# Patient Record
Sex: FEMALE | Race: BLACK OR AFRICAN AMERICAN | NOT HISPANIC OR LATINO | Employment: FULL TIME | URBAN - METROPOLITAN AREA
[De-identification: names, ages, dates, MRNs, and addresses within clinical notes are randomized per-mention and may not be internally consistent; named-entity substitution may affect disease eponyms.]

---

## 2017-03-09 ENCOUNTER — OFFICE VISIT (OUTPATIENT)
Dept: FAMILY MEDICINE | Facility: CLINIC | Age: 52
End: 2017-03-09
Payer: COMMERCIAL

## 2017-03-09 VITALS
HEART RATE: 96 BPM | OXYGEN SATURATION: 97 % | BODY MASS INDEX: 34.22 KG/M2 | TEMPERATURE: 98 F | DIASTOLIC BLOOD PRESSURE: 78 MMHG | WEIGHT: 169.75 LBS | SYSTOLIC BLOOD PRESSURE: 120 MMHG | HEIGHT: 59 IN

## 2017-03-09 DIAGNOSIS — B96.89 ACUTE BACTERIAL BRONCHITIS: Primary | ICD-10-CM

## 2017-03-09 DIAGNOSIS — J20.8 ACUTE BACTERIAL BRONCHITIS: Primary | ICD-10-CM

## 2017-03-09 DIAGNOSIS — R50.9 FEVER AND CHILLS: ICD-10-CM

## 2017-03-09 LAB
FLUAV AG SPEC QL IA: NEGATIVE
FLUBV AG SPEC QL IA: NEGATIVE
SPECIMEN SOURCE: NORMAL

## 2017-03-09 PROCEDURE — 96372 THER/PROPH/DIAG INJ SC/IM: CPT | Mod: S$GLB,,, | Performed by: NURSE PRACTITIONER

## 2017-03-09 PROCEDURE — 1160F RVW MEDS BY RX/DR IN RCRD: CPT | Mod: S$GLB,,, | Performed by: NURSE PRACTITIONER

## 2017-03-09 PROCEDURE — 3078F DIAST BP <80 MM HG: CPT | Mod: S$GLB,,, | Performed by: NURSE PRACTITIONER

## 2017-03-09 PROCEDURE — 3074F SYST BP LT 130 MM HG: CPT | Mod: S$GLB,,, | Performed by: NURSE PRACTITIONER

## 2017-03-09 PROCEDURE — 99214 OFFICE O/P EST MOD 30 MIN: CPT | Mod: 25,S$GLB,, | Performed by: NURSE PRACTITIONER

## 2017-03-09 PROCEDURE — 99999 PR PBB SHADOW E&M-EST. PATIENT-LVL IV: CPT | Mod: PBBFAC,,, | Performed by: NURSE PRACTITIONER

## 2017-03-09 PROCEDURE — 87400 INFLUENZA A/B EACH AG IA: CPT | Mod: 59,PO

## 2017-03-09 RX ORDER — AZITHROMYCIN 250 MG/1
TABLET, FILM COATED ORAL
Qty: 6 TABLET | Refills: 0 | Status: SHIPPED | OUTPATIENT
Start: 2017-03-09 | End: 2017-05-23

## 2017-03-09 RX ORDER — DEXAMETHASONE SODIUM PHOSPHATE 4 MG/ML
8 INJECTION, SOLUTION INTRA-ARTICULAR; INTRALESIONAL; INTRAMUSCULAR; INTRAVENOUS; SOFT TISSUE
Status: COMPLETED | OUTPATIENT
Start: 2017-03-09 | End: 2017-03-09

## 2017-03-09 RX ORDER — BENZONATATE 200 MG/1
200 CAPSULE ORAL 3 TIMES DAILY PRN
Qty: 30 CAPSULE | Refills: 0 | Status: SHIPPED | OUTPATIENT
Start: 2017-03-09 | End: 2017-03-19

## 2017-03-09 RX ORDER — IBUPROFEN 800 MG/1
800 TABLET ORAL 3 TIMES DAILY
Qty: 30 TABLET | Refills: 0 | Status: SHIPPED | OUTPATIENT
Start: 2017-03-09 | End: 2018-02-20

## 2017-03-09 RX ADMIN — DEXAMETHASONE SODIUM PHOSPHATE 8 MG: 4 INJECTION, SOLUTION INTRA-ARTICULAR; INTRALESIONAL; INTRAMUSCULAR; INTRAVENOUS; SOFT TISSUE at 09:03

## 2017-03-09 NOTE — PATIENT INSTRUCTIONS
Viral Upper Respiratory Illness (Adult)  You have a viral upper respiratory illness (URI), which is another term for the common cold. This illness is contagious during the first few days. It is spread through the air by coughing and sneezing. It may also be spread by direct contact (touching the sick person and then touching your own eyes, nose, or mouth). Frequent handwashing will decrease risk of spread. Most viral illnesses go away within 7 to 10 days with rest and simple home remedies. Sometimes the illness may last for several weeks. Antibiotics will not kill a virus, and they are generally not prescribed for this condition.    Home care  · If symptoms are severe, rest at home for the first 2 to 3 days. When you resume activity, don't let yourself get too tired.  · Avoid being exposed to cigarette smoke (yours or others).  · You may use acetaminophen or ibuprofen to control pain and fever, unless another medicine was prescribed. (Note: If you have chronic liver or kidney disease, have ever had a stomach ulcer or gastrointestinal bleeding, or are taking blood-thinning medicines, talk with your healthcare provider before using these medicines.) Aspirin should never be given to anyone under 18 years of age who is ill with a viral infection or fever. It may cause severe liver or brain damage.  · Your appetite may be poor, so a light diet is fine. Avoid dehydration by drinking 6 to 8 glasses of fluids per day (water, soft drinks, juices, tea, or soup). Extra fluids will help loosen secretions in the nose and lungs.  · Over-the-counter cold medicines will not shorten the length of time youre sick, but they may be helpful for the following symptoms: cough, sore throat, and nasal and sinus congestion. (Note: Do not use decongestants if you have high blood pressure.)  Follow-up care  Follow up with your healthcare provider, or as advised.  When to seek medical advice  Call your healthcare provider right away if any  of these occur:  · Cough with lots of colored sputum (mucus)  · Severe headache; face, neck, or ear pain  · Difficulty swallowing due to throat pain  · Fever of 100.4°F (38°C)  Call 911, or get immediate medical care  Call emergency services right away if any of these occur:  · Chest pain, shortness of breath, wheezing, or difficulty breathing  · Coughing up blood  · Inability to swallow due to throat pain  Date Last Reviewed: 9/13/2015  © 3486-6791 [employer]. [address] [place] [address]. All rights reserved. This information is not intended as a substitute for professional medical care. Always follow your healthcare professional's instructions.

## 2017-03-09 NOTE — PROGRESS NOTES
"Subjective:       Patient ID: Rika June is a 51 y.o. female.    Chief Complaint: URI    URI    This is a new problem. The current episode started 1 to 4 weeks ago. The problem has been unchanged. There has been no fever. Associated symptoms include coughing, ear pain and headaches. Pertinent negatives include no congestion, rhinorrhea, sneezing or sore throat. Treatments tried: theraflu.       No past medical history on file.    Social History     Social History    Marital status: Legally      Spouse name: N/A    Number of children: N/A    Years of education: N/A     Occupational History    Not on file.     Social History Main Topics    Smoking status: Never Smoker    Smokeless tobacco: Not on file    Alcohol use No    Drug use: No    Sexual activity: Yes     Partners: Male     Other Topics Concern    Are You Pregnant Or Think You May Be? No    Breast-Feeding No     Social History Narrative       Past Surgical History:   Procedure Laterality Date     SECTION      TUBAL LIGATION         Review of Systems   Constitutional: Positive for fever. Negative for chills.   HENT: Positive for ear pain and postnasal drip. Negative for congestion, rhinorrhea, sinus pressure, sneezing, sore throat and trouble swallowing.    Respiratory: Positive for cough.    Neurological: Positive for headaches.   All other systems reviewed and are negative.      Objective:   /78 (BP Location: Right arm, Patient Position: Sitting, BP Method: Manual)  Pulse 96  Temp 98.3 °F (36.8 °C) (Oral)   Ht 4' 11" (1.499 m)  Wt 77 kg (169 lb 12.1 oz)  SpO2 97%  BMI 34.29 kg/m2     Physical Exam   Constitutional: She is oriented to person, place, and time. She appears well-developed and well-nourished. She is cooperative.   HENT:   Head: Normocephalic and atraumatic.   Right Ear: Hearing, tympanic membrane, external ear and ear canal normal.   Left Ear: Hearing, tympanic membrane, external ear and ear canal " normal.   Nose: No rhinorrhea.   Mouth/Throat: No oropharyngeal exudate, posterior oropharyngeal edema or posterior oropharyngeal erythema.   Cardiovascular: Normal rate and regular rhythm.    Pulmonary/Chest: Effort normal. No respiratory distress. She has decreased breath sounds in the right lower field and the left lower field. She has no wheezes. She has no rhonchi. She has no rales.   Neurological: She is alert and oriented to person, place, and time.   Skin: Skin is warm, dry and intact. She is not diaphoretic. No pallor.   Psychiatric: She has a normal mood and affect. Her speech is normal and behavior is normal.   Vitals reviewed.      Assessment:       1. Acute bacterial bronchitis    2. Fever and chills        Plan:       Rika was seen today for uri.    Diagnoses and all orders for this visit:    Acute bacterial bronchitis  -     benzonatate (TESSALON) 200 MG capsule; Take 1 capsule (200 mg total) by mouth 3 (three) times daily as needed for Cough.  -     dexamethasone injection 8 mg; Inject 2 mLs (8 mg total) into the muscle one time.  -     ibuprofen (ADVIL,MOTRIN) 800 MG tablet; Take 1 tablet (800 mg total) by mouth 3 (three) times daily.  -     azithromycin (Z-SOLO) 250 MG tablet; Take 2 pills today, then 1 pill every day for the next 4 days    Fever and chills  -     Influenza antigen Nasopharyngeal Swab (-) in clinic today        Return if symptoms worsen or fail to improve.

## 2017-03-09 NOTE — LETTER
March 9, 2017      Rika June   [address] [place] [id number]             [facility]  [address] [facility] [address]  Phone: [phone]  Fax: [phone] Rika June    Was treated here on 03/09/2017    May Return to work/school on 03/12/2017    No Restrictions        MEHRDAD GallegosP-C

## 2017-03-09 NOTE — MR AVS SNAPSHOT
[facility]  [address] [facility]  [place] [address]  Phone: [phone]  Fax: [phone]                  Rika June   3/9/2017 9:20 AM   Office Visit    Description:  Female : 1965   Provider:  COY Razo   Department:  [facility]           Reason for Visit     URI           Diagnoses this Visit        Comments    Fever and chills    -  Primary            To Do List           Goals (5 Years of Data)     None      Follow-Up and Disposition     Return if symptoms worsen or fail to improve.       These Medications        Disp Refills Start End    benzonatate (TESSALON) 200 MG capsule 30 capsule 0 3/9/2017 3/19/2017    Take 1 capsule (200 mg total) by mouth 3 (three) times daily as needed for Cough. - Oral    Pharmacy: [facility] [address] [place] [address] [place] [facility] Ph #: [phone]       ibuprofen (ADVIL,MOTRIN) 800 MG tablet 30 tablet 0 3/9/2017     Take 1 tablet (800 mg total) by mouth 3 (three) times daily. - Oral    Pharmacy: [facility] [address] [place] [address] [place] [facility] Ph #: [phone]         [facility]     [facility] -  Assistance  Registered nurses in the [facility] provide clinical advisement, health education, appointment booking, and other advisory services.  Call for this free service at [phone].             Medications           Message regarding Medications     Verify the changes and/or additions to your medication regime listed below are the same as discussed with your clinician today.  If any of these changes or additions are incorrect, please notify your healthcare provider.        START taking these NEW medications        Refills    benzonatate (TESSALON) 200 MG capsule 0    Sig: Take 1 capsule (200 mg total) by mouth 3 (three) times daily as needed for Cough.    Class: Normal    Route:  Oral    ibuprofen (ADVIL,MOTRIN) 800 MG tablet 0    Sig: Take 1 tablet (800 mg total) by mouth 3 (three) times daily.    Class: Normal    Route: Oral      These medications were administered today        Dose Freq    dexamethasone injection 8 mg 8 mg [facility] 1 time    Sig: Inject 2 mLs (8 mg total) into the muscle one time.    Class: Normal    Route: Intramuscular      STOP taking these medications     predniSONE (DELTASONE) 20 MG tablet Please take three tabs po daily for five days total.           Verify that the below list of medications is an accurate representation of the medications you are currently taking.  If none reported, the list may be blank. If incorrect, please contact your healthcare provider. Carry this list with you in case of emergency.           Current Medications     albuterol (PROVENTIL) 2.5 mg /3 mL (0.083 %) nebulizer solution Take 3 mLs (2.5 mg total) by nebulization every 6 (six) hours as needed.    albuterol 90 mcg/actuation inhaler Inhale 2 puffs into the lungs every 6 (six) hours as needed for Wheezing.    benzonatate (TESSALON) 200 MG capsule Take 1 capsule (200 mg total) by mouth 3 (three) times daily as needed for Cough.    betamethasone dipropionate (DIPROLENE) 0.05 % cream Apply to affected area 3 times daily.  Not for face    clotrimazole (LOTRIMIN) 1 % cream Apply topically 2 (two) times daily.    ergocalciferol (VITAMIN D2) 50,000 unit Cap Take 1 capsule (50,000 Units total) by mouth every 7 days.    fluconazole (DIFLUCAN) 150 MG Tab Take two tabs by mouth on day one, then one tab by mouth each day for four days.    ibuprofen (ADVIL,MOTRIN) 800 MG tablet Take 1 tablet (800 mg total) by mouth 3 (three) times daily.    triamcinolone acetonide 0.1% (KENALOG) 0.1 % cream AAA bid on arms and under right breast.    valacyclovir (VALTREX) 1000 MG tablet Take 1 tablet (1,000 mg total) by mouth 2 (two) times daily.           Clinical Reference Information           Your Vitals Were   "   BP Pulse Temp Height Weight SpO2    120/78 (BP Location: Right arm, Patient Position: Sitting, BP Method: Manual) 96 98.3 °F (36.8 °C) (Oral) 4' 11" (1.499 m) 77 kg (169 lb 12.1 oz) 97%    BMI                34.29 kg/m2          Blood Pressure          Most Recent Value    BP  120/78      Allergies as of 3/9/2017     Hydrocodone-acetaminophen      Immunizations Administered on Date of Encounter - 3/9/2017     None      Orders Placed During Today's Visit      Normal Orders This Visit    Influenza antigen Nasopharyngeal Swab       Instructions      Viral Upper Respiratory Illness (Adult)  You have a viral upper respiratory illness (URI), which is another term for the common cold. This illness is contagious during the first few days. It is spread through the air by coughing and sneezing. It may also be spread by direct contact (touching the sick person and then touching your own eyes, nose, or mouth). Frequent handwashing will decrease risk of spread. Most viral illnesses go away within 7 to 10 days with rest and simple home remedies. Sometimes the illness may last for several weeks. Antibiotics will not kill a virus, and they are generally not prescribed for this condition.    Home care  · If symptoms are severe, rest at home for the first 2 to 3 days. When you resume activity, don't let yourself get too tired.  · Avoid being exposed to cigarette smoke (yours or others).  · You may use acetaminophen or ibuprofen to control pain and fever, unless another medicine was prescribed. (Note: If you have chronic liver or kidney disease, have ever had a stomach ulcer or gastrointestinal bleeding, or are taking blood-thinning medicines, talk with your healthcare provider before using these medicines.) Aspirin should never be given to anyone under 18 years of age who is ill with a viral infection or fever. It may cause severe liver or brain damage.  · Your appetite may be poor, so a light diet is fine. Avoid dehydration by " drinking 6 to 8 glasses of fluids per day (water, soft drinks, juices, tea, or soup). Extra fluids will help loosen secretions in the nose and lungs.  · Over-the-counter cold medicines will not shorten the length of time youre sick, but they may be helpful for the following symptoms: cough, sore throat, and nasal and sinus congestion. (Note: Do not use decongestants if you have high blood pressure.)  Follow-up care  Follow up with your healthcare provider, or as advised.  When to seek medical advice  Call your healthcare provider right away if any of these occur:  · Cough with lots of colored sputum (mucus)  · Severe headache; face, neck, or ear pain  · Difficulty swallowing due to throat pain  · Fever of 100.4°F (38°C)  Call 911, or get immediate medical care  Call emergency services right away if any of these occur:  · Chest pain, shortness of breath, wheezing, or difficulty breathing  · Coughing up blood  · Inability to swallow due to throat pain  Date Last Reviewed: 9/13/2015  © 5141-5737 [employer]. [address] [place] [address]. All rights reserved. This information is not intended as a substitute for professional medical care. Always follow your healthcare professional's instructions.             Language Assistance Services     ATTENTION: Language assistance services are available, free of charge. Please call [phone].      ATENCIÓN: Si habla español, tiene a sylvester disposición servicios gratuitos de asistencia lingüística. Llame al [phone].     CHÚ Ý: N?u b?n nói Ti?ng Vi?t, có các d?ch v? h? tr? ngôn ng? mi?n phí dành cho b?n. G?i s? [phone].         [facility] complies with applicable Federal civil rights laws and does not discriminate on the basis of race, color, national origin, age, disability, or sex.

## 2017-03-17 ENCOUNTER — TELEPHONE (OUTPATIENT)
Dept: FAMILY MEDICINE | Facility: CLINIC | Age: 52
End: 2017-03-17

## 2017-03-17 NOTE — TELEPHONE ENCOUNTER
----- Message from Maggy Byrd sent at 3/14/2017 10:22 AM CDT -----  Contact: self  Pt. Is still not feeling well and would like a call back please call at [phone]

## 2017-05-23 ENCOUNTER — OFFICE VISIT (OUTPATIENT)
Dept: FAMILY MEDICINE | Facility: CLINIC | Age: 52
End: 2017-05-23
Payer: COMMERCIAL

## 2017-05-23 VITALS
BODY MASS INDEX: 33.93 KG/M2 | TEMPERATURE: 98 F | HEIGHT: 59 IN | OXYGEN SATURATION: 99 % | WEIGHT: 168.31 LBS | SYSTOLIC BLOOD PRESSURE: 160 MMHG | DIASTOLIC BLOOD PRESSURE: 96 MMHG | HEART RATE: 90 BPM

## 2017-05-23 DIAGNOSIS — R30.0 DYSURIA: ICD-10-CM

## 2017-05-23 DIAGNOSIS — R35.0 URINARY FREQUENCY: Primary | ICD-10-CM

## 2017-05-23 LAB
BILIRUB SERPL-MCNC: NEGATIVE MG/DL
BLOOD URINE, POC: 250
COLOR, POC UA: YELLOW
GLUCOSE UR QL STRIP: NEGATIVE
KETONES UR QL STRIP: NEGATIVE
LEUKOCYTE ESTERASE URINE, POC: NORMAL
NITRITE, POC UA: NEGATIVE
PH, POC UA: 5
PROTEIN, POC: NEGATIVE
SPECIFIC GRAVITY, POC UA: 1.02
UROBILINOGEN, POC UA: NEGATIVE

## 2017-05-23 PROCEDURE — 3080F DIAST BP >= 90 MM HG: CPT | Mod: S$GLB,,, | Performed by: NURSE PRACTITIONER

## 2017-05-23 PROCEDURE — 1160F RVW MEDS BY RX/DR IN RCRD: CPT | Mod: S$GLB,,, | Performed by: NURSE PRACTITIONER

## 2017-05-23 PROCEDURE — 81002 URINALYSIS NONAUTO W/O SCOPE: CPT | Mod: S$GLB,,, | Performed by: NURSE PRACTITIONER

## 2017-05-23 PROCEDURE — 99214 OFFICE O/P EST MOD 30 MIN: CPT | Mod: 25,S$GLB,, | Performed by: NURSE PRACTITIONER

## 2017-05-23 PROCEDURE — 99999 PR PBB SHADOW E&M-EST. PATIENT-LVL IV: CPT | Mod: PBBFAC,,, | Performed by: NURSE PRACTITIONER

## 2017-05-23 PROCEDURE — 3077F SYST BP >= 140 MM HG: CPT | Mod: S$GLB,,, | Performed by: NURSE PRACTITIONER

## 2017-05-23 PROCEDURE — 87086 URINE CULTURE/COLONY COUNT: CPT

## 2017-05-23 PROCEDURE — 87147 CULTURE TYPE IMMUNOLOGIC: CPT

## 2017-05-23 PROCEDURE — 87088 URINE BACTERIA CULTURE: CPT

## 2017-05-23 RX ORDER — CIPROFLOXACIN 500 MG/1
500 TABLET ORAL EVERY 12 HOURS
Qty: 10 TABLET | Refills: 0 | Status: SHIPPED | OUTPATIENT
Start: 2017-05-23 | End: 2018-02-20

## 2017-05-23 NOTE — PATIENT INSTRUCTIONS
"  Dysuria     Painful urination (dysuria) is often caused by a problem in the urinary tract.   Dysuria is pain felt during urination. It is often described as a burning. Learn more about this problem and how it can be treated.  What causes dysuria?  Possible causes include:  · Infection with a bacteria or virus. This can be a urinary tract infection (UTI). Or it may be a sexually transmitted infection (STI).  · Sensitivity or allergy to chemicals. These chemicals are found in lotions and other products.  · Prostate or bladder problems  · Radiation therapy to the pelvic area  How is dysuria diagnosed?  Your healthcare provider will examine you. He or she will ask about your symptoms and health. After talking with you and doing a physical exam, your healthcare provider may know what is causing your dysuria. He or she will usually request  a sample of your urine. Tests of your urine, or a "urinalysis," are done. A urinalysis may include:  · Looking at the urine sample (visual exam)  · Checking for substances (chemical exam)  · Checking a small amount under a microscope (microscopic exam)  Some parts of the urinalysis may be done in the provider's office and some in a lab. And, the urine sample may be checked for bacteria and yeast (urine culture). Your healthcare provider will tell you more about these tests if they are needed.  How is dysuria treated?  Treatment depends on the cause. If you have a bacterial infection, you may need antibiotics. You may be given medications to make it easier for you to urinate and help relieve pain. Your healthcare provider can tell you more about your treatment options. Untreated, symptoms may get worse.  Call the healthcare provider right away if you have any of the following:  · Fever of 100.4°F (38°C) or higher   · No improvement after three days of treatment  · Trouble urinating because of pain  · New or increased discharge from the vagina or penis  · Rash or joint " "pain  · Increased back or abdominal pain  · Enlarged painful lymph nodes (lumps) in the groin   Date Last Reviewed: 9/24/2014  © 3660-6216 [employer]. [address] [place] [address], [place] [address]. All rights reserved. This information is not intended as a substitute for professional medical care. Always follow your healthcare professional's instructions.        Bladder Infection, Female (Adult)    Urine is normally doesn't have any bacteria in it. But bacteria can get into the urinary tract from the skin around the rectum. Or they can travel in the blood from elsewhere in the body. Once they are in your urinary tract, they can cause infection in the urethra (urethritis), the bladder (cystitis), or the kidneys (pyelonephritis).  The most common place for an infection is in the bladder. This is called a bladder infection. This is one of the most common infections in women. Most bladder infections are easily treated. They are not serious unless the infection spreads to the kidney.  The phrases "bladder infection," "UTI," and "cystitis" are often used to describe the same thing. But they are not always the same. Cystitis is an inflammation of the bladder. The most common cause of cystitis is an infection.  Symptoms  The infection causes inflammation in the urethra and bladder. This causes many of the symptoms. The most common symptoms of a bladder infection are:  · Pain or burning when urinating  · Having to urinate more often than usual  · Urgent need to urinate  · Only a small amount of urine comes out  · Blood in urine  · Abdominal discomfort. This is usually in the lower abdomen above the pubic bone.  · Cloudy urine  · Strong- or bad-smelling urine  · Unable to urinate (urinary retention)  · Unable to hold urine in (urinary incontinence)  · Fever  · Loss of appetite  · Confusion (in older adults)  Causes  Bladder infections are not contagious. You can't get one from someone else, from a toilet seat, or " from sharing a bath.  The most common cause of bladder infections is bacteria from the bowels. The bacteria get onto the skin around the opening of the urethra. From there, they can get into the urine and travel up to the bladder, causing inflammation and infection. This usually happens because of:  · Wiping improperly after urinating. Always wipe from front to back.  · Bowel incontinence  · Pregnancy  · Procedures such as having a catheter inserted  · Older age  · Not emptying your bladder. This can allow bacteria a chance to grow in your urine.  · Dehydration  · Constipation  · Sex  · Use of a diaphragm for birth control   Treatment  Bladder infections are diagnosed by a urine test. They are treated with antibiotics and usually clear up quickly without complications. Treatment helps prevent a more serious kidney infection.  Medicines  Medicines can help in the treatment of a bladder infection:  · Take antibiotics until they are used up, even if you feel better. It is important to finish them to make sure the infection has cleared.  · You can use acetaminophen or ibuprofen for pain, fever, or discomfort, unless another medicine was prescribed. If you have chronic liver or kidney disease, talk with your healthcare provider before using these medicines. Also talk with your provider if you've ever had a stomach ulcer or gastrointestinal bleeding, or are taking blood-thinner medicines.  · If you are given phenazopydridine to reduce burning with urination, it will cause your urine to become a bright orange color. This can stain clothing.  Care and prevention  These self-care steps can help prevent future infections:  · Drink plenty of fluids to prevent dehydration and flush out your bladder. Do this unless you must restrict fluids for other health reasons, or your doctor told you not to.  · Proper cleaning after going to the bathroom is important. Wipe from front to back after using the toilet to prevent the spread of  bacteria.  · Urinate more often. Don't try to hold urine in for a long time.  · Wear loose-fitting clothes and cotton underwear. Avoid tight-fitting pants.  · Improve your diet and prevent constipation. Eat more fresh fruit and vegetables, and fiber, and less junk and fatty foods.  · Avoid sex until your symptoms are gone.  · Avoid caffeine, alcohol, and spicy foods. These can irritate your bladder.  · Urinate right after intercourse to flush out your bladder.  · If you use birth control pills and have frequent bladder infections, discuss it with your doctor.  Follow-up care  Call your healthcare provider if all symptoms are not gone after 3 days of treatment. This is especially important if you have repeat infections.  If a culture was done, you will be told if your treatment needs to be changed. If directed, you can call to find out the results.  If X-rays were done, you will be told if the results will affect your treatment.  Call 911  Call 911 if any of the following occur:  · Trouble breathing  · Hard to wake up or confusion  · Fainting or loss of consciousness  · Rapid heart rate  When to seek medical advice  Call your healthcare provider right away if any of these occur:  · Fever of 100.4ºF (38.0ºC) or higher, or as directed by your healthcare provider  · Symptoms are not better by the third day of treatment  · Back or belly (abdominal) pain that gets worse  · Repeated vomiting, or unable to keep medicine down  · Weakness or dizziness  · Vaginal discharge  · Pain, redness, or swelling in the outer vaginal area (labia)  Date Last Reviewed: 10/1/2016  © 1239-6865 [employer]. [address] [place] [address], [place] [address]. All rights reserved. This information is not intended as a substitute for professional medical care. Always follow your healthcare professional's instructions.

## 2017-05-23 NOTE — LETTER
May 23, 2017      Rika June   [address] [place] [id number]             [facility]  [address] [facility] [address]  Phone: [phone]  Fax: [phone] Rika June    Was treated here on 05/23/2017    May Return to work/school on 05/23/2017        No Restrictions      ADRIANA GallegosC

## 2017-05-23 NOTE — PROGRESS NOTES
"Subjective:       Patient ID: Rika June is a 51 y.o. female.    Chief Complaint: Urinary Tract Infection    Urinary Tract Infection    This is a new problem. The current episode started yesterday. The problem occurs every urination. The problem has been gradually worsening. The quality of the pain is described as aching and burning. The pain is at a severity of 2/10. The pain is mild. There has been no fever. Associated symptoms include frequency and hematuria. Pertinent negatives include no chills, flank pain or urgency. She has tried nothing for the symptoms.       History reviewed. No pertinent past medical history.    Social History     Social History    Marital status: Legally      Spouse name: N/A    Number of children: N/A    Years of education: N/A     Occupational History    Not on file.     Social History Main Topics    Smoking status: Never Smoker    Smokeless tobacco: Not on file    Alcohol use No    Drug use: No    Sexual activity: Yes     Partners: Male     Other Topics Concern    Are You Pregnant Or Think You May Be? No    Breast-Feeding No     Social History Narrative    No narrative on file       Past Surgical History:   Procedure Laterality Date     SECTION      TUBAL LIGATION         Review of Systems   Constitutional: Negative for chills and fever.   Genitourinary: Positive for dysuria, frequency and hematuria. Negative for decreased urine volume, difficulty urinating, flank pain, pelvic pain and urgency.   All other systems reviewed and are negative.      Objective:   BP (!) 160/96 (BP Location: Right arm, Patient Position: Sitting, BP Method: Manual)   Pulse 90   Temp 98.1 °F (36.7 °C) (Oral)   Ht 4' 11" (1.499 m)   Wt 76.4 kg (168 lb 5.1 oz)   SpO2 99%   BMI 34.00 kg/m²      Physical Exam   Constitutional: She is oriented to person, place, and time. She appears well-developed and well-nourished.   Cardiovascular: Normal rate, regular rhythm and normal " "heart sounds.    Pulmonary/Chest: Effort normal and breath sounds normal.   Abdominal: Soft. Normal appearance. There is tenderness (with palpation) in the suprapubic area. There is no rebound, no guarding and no CVA tenderness.   Neurological: She is alert and oriented to person, place, and time.   Skin: Skin is warm, dry and intact.   Psychiatric: She has a normal mood and affect. Her speech is normal and behavior is normal.   Vitals reviewed.      Recent Results (from the past 24 hour(s))   POCT URINE DIPSTICK WITHOUT MICROSCOPE    Collection Time: 05/23/17  2:51 PM   Result Value Ref Range    Color, UA yellow     Spec Grav UA 1.020     pH, UA 5     WBC, UA +     Nitrite, UA negative     Protein negative     Glucose, UA negative     Ketones, UA negative     Urobilinogen, UA negative     Bilirubin negative     Blood,         Assessment:       1. Urinary frequency    2. Dysuria        Plan:       Rika was seen today for urinary tract infection.    Diagnoses and all orders for this visit:    Urinary frequency  -     POCT URINE DIPSTICK WITHOUT MICROSCOPE  -     Urine culture        -     ciprofloxacin HCl (CIPRO) 500 MG tablet; Take 1 tablet (500 mg total) by mouth every 12 (twelve) hours.    Dysuria  -     She will take AZO.    Will follow up after results available.   Return if symptoms worsen or fail to improve.  "This note will not be shared with the patient."      "

## 2017-05-24 LAB — BACTERIA UR CULT: NORMAL

## 2017-09-07 DIAGNOSIS — Z12.11 COLON CANCER SCREENING: ICD-10-CM

## 2017-10-11 ENCOUNTER — LAB VISIT (OUTPATIENT)
Dept: LAB | Facility: HOSPITAL | Age: 52
End: 2017-10-11
Attending: NURSE PRACTITIONER
Payer: COMMERCIAL

## 2017-10-11 ENCOUNTER — OFFICE VISIT (OUTPATIENT)
Dept: FAMILY MEDICINE | Facility: CLINIC | Age: 52
End: 2017-10-11
Payer: COMMERCIAL

## 2017-10-11 VITALS
BODY MASS INDEX: 32.51 KG/M2 | OXYGEN SATURATION: 97 % | DIASTOLIC BLOOD PRESSURE: 86 MMHG | SYSTOLIC BLOOD PRESSURE: 136 MMHG | TEMPERATURE: 99 F | HEIGHT: 59 IN | WEIGHT: 161.25 LBS | HEART RATE: 96 BPM

## 2017-10-11 DIAGNOSIS — Z11.59 NEED FOR HEPATITIS C SCREENING TEST: ICD-10-CM

## 2017-10-11 DIAGNOSIS — J06.9 UPPER RESPIRATORY TRACT INFECTION, UNSPECIFIED TYPE: Primary | ICD-10-CM

## 2017-10-11 DIAGNOSIS — Z13.220 NEED FOR LIPID SCREENING: ICD-10-CM

## 2017-10-11 DIAGNOSIS — Z23 NEED FOR INFLUENZA VACCINATION: ICD-10-CM

## 2017-10-11 LAB
CHOLEST SERPL-MCNC: 150 MG/DL
CHOLEST/HDLC SERPL: 3.7 {RATIO}
HDLC SERPL-MCNC: 41 MG/DL
HDLC SERPL: 27.3 %
LDLC SERPL CALC-MCNC: 82.4 MG/DL
NONHDLC SERPL-MCNC: 109 MG/DL
TRIGL SERPL-MCNC: 133 MG/DL

## 2017-10-11 PROCEDURE — 90686 IIV4 VACC NO PRSV 0.5 ML IM: CPT | Mod: S$GLB,,, | Performed by: NURSE PRACTITIONER

## 2017-10-11 PROCEDURE — 80061 LIPID PANEL: CPT

## 2017-10-11 PROCEDURE — 36415 COLL VENOUS BLD VENIPUNCTURE: CPT | Mod: PO

## 2017-10-11 PROCEDURE — 90471 IMMUNIZATION ADMIN: CPT | Mod: 59,S$GLB,, | Performed by: NURSE PRACTITIONER

## 2017-10-11 PROCEDURE — 86803 HEPATITIS C AB TEST: CPT

## 2017-10-11 PROCEDURE — 99999 PR PBB SHADOW E&M-EST. PATIENT-LVL IV: CPT | Mod: PBBFAC,,, | Performed by: NURSE PRACTITIONER

## 2017-10-11 PROCEDURE — 96372 THER/PROPH/DIAG INJ SC/IM: CPT | Mod: S$GLB,,, | Performed by: NURSE PRACTITIONER

## 2017-10-11 PROCEDURE — 99214 OFFICE O/P EST MOD 30 MIN: CPT | Mod: 25,S$GLB,, | Performed by: NURSE PRACTITIONER

## 2017-10-11 RX ORDER — LEVOCETIRIZINE DIHYDROCHLORIDE 5 MG/1
5 TABLET, FILM COATED ORAL NIGHTLY
Qty: 30 TABLET | Refills: 0 | Status: SHIPPED | OUTPATIENT
Start: 2017-10-11 | End: 2019-03-25 | Stop reason: SDUPTHER

## 2017-10-11 RX ORDER — PROMETHAZINE HYDROCHLORIDE AND DEXTROMETHORPHAN HYDROBROMIDE 6.25; 15 MG/5ML; MG/5ML
5 SYRUP ORAL
Qty: 180 ML | Refills: 0 | Status: SHIPPED | OUTPATIENT
Start: 2017-10-11 | End: 2017-10-21

## 2017-10-11 RX ORDER — TRIAMCINOLONE ACETONIDE 40 MG/ML
40 INJECTION, SUSPENSION INTRA-ARTICULAR; INTRAMUSCULAR
Status: COMPLETED | OUTPATIENT
Start: 2017-10-11 | End: 2017-10-11

## 2017-10-11 RX ORDER — FLUTICASONE PROPIONATE 50 MCG
2 SPRAY, SUSPENSION (ML) NASAL DAILY
Qty: 16 G | Refills: 0 | Status: SHIPPED | OUTPATIENT
Start: 2017-10-11 | End: 2019-03-25

## 2017-10-11 RX ADMIN — TRIAMCINOLONE ACETONIDE 40 MG: 40 INJECTION, SUSPENSION INTRA-ARTICULAR; INTRAMUSCULAR at 02:10

## 2017-10-11 NOTE — PATIENT INSTRUCTIONS
Viral Upper Respiratory Illness (Adult)  You have a viral upper respiratory illness (URI), which is another term for the common cold. This illness is contagious during the first few days. It is spread through the air by coughing and sneezing. It may also be spread by direct contact (touching the sick person and then touching your own eyes, nose, or mouth). Frequent handwashing will decrease risk of spread. Most viral illnesses go away within 7 to 10 days with rest and simple home remedies. Sometimes the illness may last for several weeks. Antibiotics will not kill a virus, and they are generally not prescribed for this condition.    Home care  · If symptoms are severe, rest at home for the first 2 to 3 days. When you resume activity, don't let yourself get too tired.  · Avoid being exposed to cigarette smoke (yours or others).  · You may use acetaminophen or ibuprofen to control pain and fever, unless another medicine was prescribed. (Note: If you have chronic liver or kidney disease, have ever had a stomach ulcer or gastrointestinal bleeding, or are taking blood-thinning medicines, talk with your healthcare provider before using these medicines.) Aspirin should never be given to anyone under 18 years of age who is ill with a viral infection or fever. It may cause severe liver or brain damage.  · Your appetite may be poor, so a light diet is fine. Avoid dehydration by drinking 6 to 8 glasses of fluids per day (water, soft drinks, juices, tea, or soup). Extra fluids will help loosen secretions in the nose and lungs.  · Over-the-counter cold medicines will not shorten the length of time youre sick, but they may be helpful for the following symptoms: cough, sore throat, and nasal and sinus congestion. (Note: Do not use decongestants if you have high blood pressure.)  Follow-up care  Follow up with your healthcare provider, or as advised.  When to seek medical advice  Call your healthcare provider right away if any  of these occur:  · Cough with lots of colored sputum (mucus)  · Severe headache; face, neck, or ear pain  · Difficulty swallowing due to throat pain  · Fever of 100.4°F (38°C)  Call 911, or get immediate medical care  Call emergency services right away if any of these occur:  · Chest pain, shortness of breath, wheezing, or difficulty breathing  · Coughing up blood  · Inability to swallow due to throat pain  Date Last Reviewed: 9/13/2015  © 4083-4945 [employer]. [address] [place] [address]. All rights reserved. This information is not intended as a substitute for professional medical care. Always follow your healthcare professional's instructions.

## 2017-10-11 NOTE — PROGRESS NOTES
"Subjective:       Patient ID: Rika June is a 52 y.o. female.    Chief Complaint: URI (headache,ear ache/left ,sore throat,chills X Monday )    URI    This is a new problem. Episode onset: 3 days ago. The problem has been gradually worsening. There has been no fever. Associated symptoms include ear pain, headaches and a sore throat. Pertinent negatives include no chest pain, congestion, coughing, nausea, rhinorrhea, sneezing or vomiting. Treatments tried: nyquil. The treatment provided mild relief.       History reviewed. No pertinent past medical history.    Social History     Social History    Marital status: Legally      Spouse name: N/A    Number of children: N/A    Years of education: N/A     Occupational History    Not on file.     Social History Main Topics    Smoking status: Never Smoker    Smokeless tobacco: Never Used    Alcohol use No    Drug use: No    Sexual activity: Yes     Partners: Male     Other Topics Concern    Are You Pregnant Or Think You May Be? No    Breast-Feeding No     Social History Narrative    No narrative on file       Past Surgical History:   Procedure Laterality Date     SECTION      TUBAL LIGATION         Review of Systems   Constitutional: Negative for chills.   HENT: Positive for ear pain, postnasal drip, sinus pressure and sore throat. Negative for congestion, rhinorrhea, sneezing and trouble swallowing.    Respiratory: Negative for cough, chest tightness and shortness of breath.    Cardiovascular: Negative for chest pain and palpitations.   Gastrointestinal: Negative for nausea and vomiting.   Neurological: Positive for headaches.   All other systems reviewed and are negative.      Objective:   /86 (BP Location: Left arm, Patient Position: Sitting, BP Method: Large (Manual))   Pulse 96   Temp 98.8 °F (37.1 °C) (Oral)   Ht 4' 11" (1.499 m)   Wt 73.1 kg (161 lb 4.3 oz)   SpO2 97%   BMI 32.57 kg/m²      Physical Exam   Constitutional: " She is oriented to person, place, and time. She appears well-developed and well-nourished. She is cooperative.   HENT:   Head: Normocephalic and atraumatic.   Right Ear: Hearing, external ear and ear canal normal. A middle ear effusion is present.   Left Ear: Hearing, external ear and ear canal normal. A middle ear effusion is present.   Nose: Mucosal edema present.   Mouth/Throat: Uvula is midline and mucous membranes are normal. Posterior oropharyngeal erythema present. No oropharyngeal exudate or posterior oropharyngeal edema.   Eyes: Conjunctivae and lids are normal.   Cardiovascular: Normal rate, regular rhythm, S1 normal, S2 normal and normal heart sounds.    Pulmonary/Chest: Effort normal and breath sounds normal. No respiratory distress. She has no decreased breath sounds. She has no wheezes. She has no rhonchi. She has no rales.   Neurological: She is alert and oriented to person, place, and time.   Skin: Skin is warm, dry and intact. She is not diaphoretic. No pallor.   Psychiatric: She has a normal mood and affect. Her speech is normal and behavior is normal.   Vitals reviewed.      Assessment:       1. Upper respiratory tract infection, unspecified type    2. Need for hepatitis C screening test    3. Need for lipid screening    4. Need for influenza vaccination        Plan:       Rika was seen today for uri.    Diagnoses and all orders for this visit:    Upper respiratory tract infection, unspecified type  -     triamcinolone acetonide injection 40 mg; Inject 1 mL (40 mg total) into the muscle one time.  -     fluticasone (FLONASE) 50 mcg/actuation nasal spray; 2 sprays by Each Nare route once daily.  -     promethazine-dextromethorphan (PROMETHAZINE-DM) 6.25-15 mg/5 mL Syrp; Take 5 mLs by mouth every 4 to 6 hours as needed.  -     levocetirizine (XYZAL) 5 MG tablet; Take 1 tablet (5 mg total) by mouth every evening.    Need for hepatitis C screening test  -     Hepatitis C antibody; Future    Need  for lipid screening  -     Lipid panel; Future    Need for influenza vaccination  -     Influenza - Quadrivalent (3 years & older) (PF)      Return if symptoms worsen or fail to improve.

## 2017-10-11 NOTE — LETTER
October 11, 2017      Rika June   [address] [place] [id number]             [facility]  [address] [facility] [address]  Phone: [phone]  Fax: [phone] Rika Jaimesemily June    Was treated here on 10/11/2017    May Return to work/school on 10/14/2017                ADRIANA GallegosC

## 2017-10-12 LAB — HCV AB SERPL QL IA: NEGATIVE

## 2017-10-17 ENCOUNTER — TELEPHONE (OUTPATIENT)
Dept: FAMILY MEDICINE | Facility: CLINIC | Age: 52
End: 2017-10-17

## 2017-10-17 ENCOUNTER — PATIENT MESSAGE (OUTPATIENT)
Dept: FAMILY MEDICINE | Facility: CLINIC | Age: 52
End: 2017-10-17

## 2017-10-17 RX ORDER — BENZONATATE 200 MG/1
200 CAPSULE ORAL 3 TIMES DAILY PRN
Qty: 30 CAPSULE | Refills: 0 | Status: SHIPPED | OUTPATIENT
Start: 2017-10-17 | End: 2017-10-27

## 2017-10-17 RX ORDER — AZITHROMYCIN 250 MG/1
TABLET, FILM COATED ORAL
Qty: 6 TABLET | Refills: 0 | Status: SHIPPED | OUTPATIENT
Start: 2017-10-17 | End: 2018-02-20

## 2017-10-17 NOTE — TELEPHONE ENCOUNTER
----- Message from Nadia Rincon sent at 10/17/2017 11:00 AM CDT -----  Contact: Self   Patient says her medications are not working she need something for a cough and ear ache. Patient says there is no call back number because she is at work and cannot take a call.         [facility]

## 2017-11-05 ENCOUNTER — OFFICE VISIT (OUTPATIENT)
Dept: FAMILY MEDICINE | Facility: CLINIC | Age: 52
End: 2017-11-05
Payer: COMMERCIAL

## 2017-11-05 VITALS
WEIGHT: 158.75 LBS | DIASTOLIC BLOOD PRESSURE: 84 MMHG | HEIGHT: 59 IN | TEMPERATURE: 97 F | OXYGEN SATURATION: 99 % | HEART RATE: 68 BPM | BODY MASS INDEX: 32 KG/M2 | SYSTOLIC BLOOD PRESSURE: 152 MMHG

## 2017-11-05 DIAGNOSIS — R03.0 ELEVATED BLOOD PRESSURE READING: ICD-10-CM

## 2017-11-05 DIAGNOSIS — B37.31 VAGINAL CANDIDIASIS: Primary | ICD-10-CM

## 2017-11-05 PROCEDURE — 99214 OFFICE O/P EST MOD 30 MIN: CPT | Mod: S$GLB,,, | Performed by: FAMILY MEDICINE

## 2017-11-05 PROCEDURE — 99999 PR PBB SHADOW E&M-EST. PATIENT-LVL III: CPT | Mod: PBBFAC,,,

## 2017-11-05 RX ORDER — FLUCONAZOLE 150 MG/1
150 TABLET ORAL ONCE
Qty: 1 TABLET | Refills: 1 | Status: SHIPPED | OUTPATIENT
Start: 2017-11-05 | End: 2017-11-05

## 2017-11-05 NOTE — PROGRESS NOTES
Routine Office Visit    Patient Name: Rika June    : 1965  MRN: [id number]    Subjective:  Rika is a 52 y.o. female who presents today for:    1. Yeast infection  Patient presenting today with complaints of vaginal yeast infection that has not responded to OTC treatment.  She states that inside the vagina is no longer itching, but the outside is.  There has been no discharge and no urinary symptoms.  No abdominal or flank pain.  She denies any vaginal discharge.      Past Medical History  History reviewed. No pertinent past medical history.    Past Surgical History  Past Surgical History:   Procedure Laterality Date     SECTION      TUBAL LIGATION         Family History  Family History   Problem Relation Age of Onset    Breast cancer Mother     Cancer Father      throat    Diabetes Maternal Grandmother     Ovarian cancer Neg Hx     Stroke Neg Hx     Hypertension Neg Hx        Social History  Social History     Social History    Marital status: Legally      Spouse name: N/A    Number of children: N/A    Years of education: N/A     Occupational History    Not on file.     Social History Main Topics    Smoking status: Never Smoker    Smokeless tobacco: Never Used    Alcohol use No    Drug use: No    Sexual activity: Yes     Partners: Male     Other Topics Concern    Are You Pregnant Or Think You May Be? No    Breast-Feeding No     Social History Narrative    No narrative on file       Current Medications  Current Outpatient Prescriptions on File Prior to Visit   Medication Sig Dispense Refill    albuterol (PROVENTIL) 2.5 mg /3 mL (0.083 %) nebulizer solution Take 3 mLs (2.5 mg total) by nebulization every 6 (six) hours as needed. 1 Box 0    albuterol 90 mcg/actuation inhaler Inhale 2 puffs into the lungs every 6 (six) hours as needed for Wheezing. 18 g 0    azithromycin (Z-SOLO) 250 MG tablet Take 2 pills today, then 1 pill every day for the next 4 days 6 tablet 0     "ciprofloxacin HCl (CIPRO) 500 MG tablet Take 1 tablet (500 mg total) by mouth every 12 (twelve) hours. 10 tablet 0    clotrimazole (LOTRIMIN) 1 % cream Apply topically 2 (two) times daily. 45 g 0    fluticasone (FLONASE) 50 mcg/actuation nasal spray 2 sprays by Each Nare route once daily. 16 g 0    ibuprofen (ADVIL,MOTRIN) 800 MG tablet Take 1 tablet (800 mg total) by mouth 3 (three) times daily. 30 tablet 0    levocetirizine (XYZAL) 5 MG tablet Take 1 tablet (5 mg total) by mouth every evening. 30 tablet 0    valacyclovir (VALTREX) 1000 MG tablet Take 1 tablet (1,000 mg total) by mouth 2 (two) times daily. 20 tablet 0     No current facility-administered medications on file prior to visit.        Allergies   Review of patient's allergies indicates:   Allergen Reactions    Hydrocodone-acetaminophen Nausea Only     Other reaction(s): Nausea       Review of Systems (Pertinent positives)  Review of Systems   Constitutional: Negative.    HENT: Negative.    Eyes: Negative.    Respiratory: Negative.    Cardiovascular: Negative.    Gastrointestinal: Negative.    Genitourinary:        +vaginal itching   Skin: Negative.          BP (!) 152/84   Pulse 68   Temp 97.3 °F (36.3 °C) (Oral)   Ht 4' 11" (1.499 m)   Wt 72 kg (158 lb 11.7 oz)   SpO2 99%   BMI 32.06 kg/m²     GENERAL APPEARANCE: in no apparent distress and well developed and well nourished  HEENT: PERRL, EOMI, Sclera clear, anicteric, Oropharynx clear, no lesions, Neck supple with midline trachea  NECK: normal, supple, no adenopathy, thyroid normal in size  RESPIRATORY: appears well, vitals normal, no respiratory distress, acyanotic, normal RR, chest clear, no wheezing, crepitations, rhonchi, normal symmetric air entry  HEART: regular rate and rhythm, S1, S2 normal, no murmur, click, rub or gallop.    ABDOMEN: abdomen is soft without tenderness, no masses, no hernias, no organomegaly, no rebound, no guarding. Suprapubic tenderness absent. No CVA " tenderness.  SKIN: no rashes, no wounds, no other lesions  PSYCH: Alert, oriented x 3, thought content appropriate, speech normal, pleasant and cooperative, good eye contact, well groomed    Assessment/Plan:  Rika June is a 52 y.o. female who presents today for :    Rika was seen today for vaginitis.    Diagnoses and all orders for this visit:    Vaginal candidiasis  -     fluconazole (DIFLUCAN) 150 MG Tab; Take 1 tablet (150 mg total) by mouth once.    Elevated blood pressure reading      1.  Patient to take medications as prescribed  2.  Follow up with PCP for evaluation of elevated BP  3.  Review of previous BP's showed mostly normal readings, but should be followed closely  4.  She is to call with any concerns    Elvin Ferguson MD

## 2017-11-05 NOTE — LETTER
November 5, 2017      [facility]  [address] [facility]  [place] [address]  Phone: [phone]  Fax: [phone]       Patient: Rika June   YOB: 1965  Date of Visit: 11/05/2017    To Whom It May Concern:    Jory June  was at [facility] on 11/05/2017. She may return to work/school on 11/6/17 with no restrictions. If you have any questions or concerns, or if I can be of further assistance, please do not hesitate to contact me.    Sincerely,          Elvin Ferguson MD

## 2018-01-21 ENCOUNTER — OFFICE VISIT (OUTPATIENT)
Dept: FAMILY MEDICINE | Facility: CLINIC | Age: 53
End: 2018-01-21
Payer: COMMERCIAL

## 2018-01-21 VITALS
TEMPERATURE: 98 F | OXYGEN SATURATION: 97 % | SYSTOLIC BLOOD PRESSURE: 120 MMHG | HEART RATE: 75 BPM | DIASTOLIC BLOOD PRESSURE: 70 MMHG

## 2018-01-21 DIAGNOSIS — B96.89 ACUTE BACTERIAL BRONCHITIS: Primary | ICD-10-CM

## 2018-01-21 DIAGNOSIS — J20.8 ACUTE BACTERIAL BRONCHITIS: Primary | ICD-10-CM

## 2018-01-21 DIAGNOSIS — R05.9 COUGH: ICD-10-CM

## 2018-01-21 PROCEDURE — 99214 OFFICE O/P EST MOD 30 MIN: CPT | Mod: S$GLB,,, | Performed by: NURSE PRACTITIONER

## 2018-01-21 PROCEDURE — 99999 PR PBB SHADOW E&M-EST. PATIENT-LVL III: CPT | Mod: PBBFAC,,, | Performed by: NURSE PRACTITIONER

## 2018-01-21 RX ORDER — ALBUTEROL SULFATE 90 UG/1
2 AEROSOL, METERED RESPIRATORY (INHALATION) EVERY 6 HOURS PRN
Qty: 18 G | Refills: 3 | Status: SHIPPED | OUTPATIENT
Start: 2018-01-21 | End: 2019-01-21

## 2018-01-21 RX ORDER — METHYLPREDNISOLONE 4 MG/1
TABLET ORAL
Qty: 1 PACKAGE | Refills: 0 | Status: SHIPPED | OUTPATIENT
Start: 2018-01-21 | End: 2018-02-11

## 2018-01-21 RX ORDER — DOXYCYCLINE 100 MG/1
100 CAPSULE ORAL EVERY 12 HOURS
Qty: 20 CAPSULE | Refills: 0 | Status: SHIPPED | OUTPATIENT
Start: 2018-01-21 | End: 2018-01-31

## 2018-01-21 RX ORDER — CODEINE PHOSPHATE AND GUAIFENESIN 10; 100 MG/5ML; MG/5ML
5 SOLUTION ORAL 3 TIMES DAILY PRN
Qty: 118 ML | Refills: 0 | Status: SHIPPED | OUTPATIENT
Start: 2018-01-21 | End: 2018-01-31

## 2018-01-21 NOTE — PROGRESS NOTES
Subjective:       Patient ID: Rika June is a 52 y.o. female.    Chief Complaint: cough, stuffy nose (x2d)    Cough   This is a new problem. The current episode started in the past 7 days. The problem has been gradually worsening. The problem occurs every few minutes. The cough is non-productive. Associated symptoms include ear congestion, myalgias, nasal congestion, postnasal drip and rhinorrhea. Pertinent negatives include no chest pain, chills, ear pain, fever, headaches, heartburn, hemoptysis, rash, sore throat, shortness of breath, sweats, weight loss or wheezing. Nothing aggravates the symptoms. Treatments tried: nyquil.     Review of Systems   Constitutional: Negative for chills, diaphoresis, fatigue, fever and weight loss.   HENT: Positive for congestion, postnasal drip, rhinorrhea and sneezing. Negative for ear pain, sinus pain, sinus pressure and sore throat.    Respiratory: Positive for cough and chest tightness (secondary to coughing). Negative for hemoptysis, shortness of breath and wheezing.    Cardiovascular: Negative for chest pain.   Gastrointestinal: Negative for heartburn.   Musculoskeletal: Positive for myalgias.   Skin: Negative for rash.   Neurological: Negative for dizziness, weakness and headaches.   Hematological: Negative for adenopathy. Does not bruise/bleed easily.       Objective:      Physical Exam   Constitutional: She appears well-developed and well-nourished. No distress.   HENT:   Head: Normocephalic and atraumatic.   Right Ear: Tympanic membrane, external ear and ear canal normal.   Left Ear: Tympanic membrane, external ear and ear canal normal.   Nose: Mucosal edema and rhinorrhea present. Right sinus exhibits maxillary sinus tenderness and frontal sinus tenderness. Left sinus exhibits maxillary sinus tenderness and frontal sinus tenderness.   Mouth/Throat: Oropharynx is clear and moist. Mucous membranes are not dry. No oropharyngeal exudate, posterior oropharyngeal edema or  posterior oropharyngeal erythema.   Cardiovascular: Normal rate and regular rhythm.    No murmur heard.  Pulmonary/Chest: Effort normal and breath sounds normal. She has no wheezes. She has no rales.   Lymphadenopathy:     She has no cervical adenopathy.   Skin: Skin is warm and dry.   Nursing note and vitals reviewed.      Assessment:       1. Acute bacterial bronchitis    2. Cough        Plan:       Rika was seen today for cough, stuffy nose.    Diagnoses and all orders for this visit:    Acute bacterial bronchitis  -     albuterol 90 mcg/actuation inhaler; Inhale 2 puffs into the lungs every 6 (six) hours as needed for Wheezing. Rescue  -     doxycycline (VIBRAMYCIN) 100 MG Cap; Take 1 capsule (100 mg total) by mouth every 12 (twelve) hours.  -     methylPREDNISolone (MEDROL DOSEPACK) 4 mg tablet; use as directed  -     guaifenesin-codeine 100-10 mg/5 ml (TUSSI-ORGANIDIN NR)  mg/5 mL syrup; Take 5 mLs by mouth 3 (three) times daily as needed for Cough.    Cough  -     albuterol 90 mcg/actuation inhaler; Inhale 2 puffs into the lungs every 6 (six) hours as needed for Wheezing. Rescue  -     methylPREDNISolone (MEDROL DOSEPACK) 4 mg tablet; use as directed  -     guaifenesin-codeine 100-10 mg/5 ml (TUSSI-ORGANIDIN NR)  mg/5 mL syrup; Take 5 mLs by mouth 3 (three) times daily as needed for Cough.    HOME CARE:  If symptoms are severe, rest at home for the first 2-3 days. When you resume activity, don't let yourself get too tired.  Do not smoke. Avoid being exposed to the smoke of others.  You may use acetaminophen (Tylenol) or ibuprofen (Motrin, Advil) to control fever or pain, unless another medicine was prescribed for this. [NOTE: If you have chronic liver or kidney disease or ever had a stomach ulcer or GI bleeding, talk with your doctor before using these medicines.]  Your appetite may be poor, so a light diet is fine. Avoid dehydration by drinking 6-8 glasses of fluids per day (water, soft,  drinks, juices, tea, soup, etc.). Extra fluids will help loosen secretions in the lungs.  Over-the-counter cough medicines that contain dextromethorphan (such as Robitussin DM) and decongestants (Actifed or Sudafed) may help relieve cough and congestion. [NOTE: Do not use decongestants if you have high blood pressure.]  Finish all antibiotic medicine, even if you are feeling better after only a few days.  FOLLOW UP with your doctor or as directed if you dont start to feel better after three days.  [NOTE: If you are age 65 or older, or if you have chronic asthma or COPD, we recommend a PNEUMOCOCCAL VACCINATION every five years and a yearly INFLUENZAVACCINATION (FLU-SHOT) every autumn. Ask your doctor about this. If you had an X-ray, a radiologist will review it. You will be notified of any new findings that may affect your care.]  GET PROMPT MEDICAL ATTENTION if any of the following occur:  Fever over 100.4°F (38.0°C) for more than three days  Trouble breathing, wheezing or pain with breathing  Coughing up blood or increased amounts of colored sputum  Weakness, drowsiness, headache, facial pain, ear pain or a stiff neck  © 8668-0999 [facility], [address] [place] [address], [place] [address]. All rights reserved. This information is not intended as a substitute for professional medical care. Always follow your healthcare professional's instructions.

## 2018-01-21 NOTE — PATIENT INSTRUCTIONS
Bronchitis, Antibiotic Treatment (Adult)    Bronchitis is an infection of the air passages (bronchial tubes) in your lungs. It often occurs when you have a cold. This illness is contagious during the first few days and is spread through the air by coughing and sneezing, or by direct contact (touching the sick person and then touching your own eyes, nose, or mouth).  Symptoms of bronchitis include cough with mucus (phlegm) and low-grade fever. Bronchitis usually lasts 7 to 14 days. Mild cases can be treated with simple home remedies. More severe infection is treated with an antibiotic.  Home care  Follow these guidelines when caring for yourself at home:  · If your symptoms are severe, rest at home for the first 2 to 3 days. When you go back to your usual activities, don't let yourself get too tired.  · Do not smoke. Also avoid being exposed to secondhand smoke.  · You may use over-the-counter medicines to control fever or pain, unless another medicine was prescribed. (Note: If you have chronic liver or kidney disease or have ever had a stomach ulcer or gastrointestinal bleeding, talk with your healthcare provider before using these medicines. Also talk to your provider if you are taking medicine to prevent blood clots.) Aspirin should never be given to anyone younger than 18 years of age who is ill with a viral infection or fever. It may cause severe liver or brain damage.  · Your appetite may be poor, so a light diet is fine. Avoid dehydration by drinking 6 to 8 glasses of fluids per day (such as water, soft drinks, sports drinks, juices, tea, or soup). Extra fluids will help loosen secretions in the nose and lungs.  · Over-the-counter cough, cold, and sore-throat medicines will not shorten the length of the illness, but they may be helpful to reduce symptoms. (Note: Do not use decongestants if you have high blood pressure.)  · Finish all antibiotic medicine. Do this even if you are feeling better after only a  few days.  Follow-up care  Follow up with your healthcare provider, or as advised. If you had an X-ray or ECG (electrocardiogram), a specialist will review it. You will be notified of any new findings that may affect your care.  Note: If you are age 65 or older, or if you have a chronic lung disease or condition that affects your immune system, or you smoke, talk to your healthcare provider about having pneumococcal vaccinations and a yearly influenza vaccination (flu shot).  When to seek medical advice  Call your healthcare provider right away if any of these occur:  · Fever of 100.4°F (38°C) or higher  · Coughing up increased amounts of colored sputum  · Weakness, drowsiness, headache, facial pain, ear pain, or a stiff neck  Call 911, or get immediate medical care  Contact emergency services right away if any of these occur.  · Coughing up blood  · Worsening weakness, drowsiness, headache, or stiff neck  · Trouble breathing, wheezing, or pain with breathing  Date Last Reviewed: 9/13/2015  © 6362-9077 [employer]. [address] [place] [address], [place] [address]. All rights reserved. This information is not intended as a substitute for professional medical care. Always follow your healthcare professional's instructions.

## 2018-02-20 ENCOUNTER — HOSPITAL ENCOUNTER (OUTPATIENT)
Dept: RADIOLOGY | Facility: HOSPITAL | Age: 53
Discharge: HOME | End: 2018-02-20
Attending: NURSE PRACTITIONER
Payer: COMMERCIAL

## 2018-02-20 ENCOUNTER — OFFICE VISIT (OUTPATIENT)
Dept: FAMILY MEDICINE | Facility: CLINIC | Age: 53
End: 2018-02-20
Payer: COMMERCIAL

## 2018-02-20 VITALS
BODY MASS INDEX: 32 KG/M2 | DIASTOLIC BLOOD PRESSURE: 88 MMHG | WEIGHT: 158.75 LBS | HEIGHT: 59 IN | HEART RATE: 76 BPM | OXYGEN SATURATION: 99 % | SYSTOLIC BLOOD PRESSURE: 146 MMHG | TEMPERATURE: 98 F

## 2018-02-20 DIAGNOSIS — M77.52 LEFT ANKLE TENDONITIS: ICD-10-CM

## 2018-02-20 DIAGNOSIS — M77.52 LEFT ANKLE TENDONITIS: Primary | ICD-10-CM

## 2018-02-20 PROCEDURE — 73610 X-RAY EXAM OF ANKLE: CPT | Mod: TC,FY,PO,LT

## 2018-02-20 PROCEDURE — 99999 PR PBB SHADOW E&M-EST. PATIENT-LVL III: CPT | Mod: PBBFAC,,, | Performed by: NURSE PRACTITIONER

## 2018-02-20 PROCEDURE — 73610 X-RAY EXAM OF ANKLE: CPT | Mod: 26,LT,, | Performed by: RADIOLOGY

## 2018-02-20 PROCEDURE — 99213 OFFICE O/P EST LOW 20 MIN: CPT | Mod: S$GLB,,, | Performed by: NURSE PRACTITIONER

## 2018-02-20 PROCEDURE — 3008F BODY MASS INDEX DOCD: CPT | Mod: S$GLB,,, | Performed by: NURSE PRACTITIONER

## 2018-02-20 RX ORDER — IBUPROFEN 800 MG/1
800 TABLET ORAL EVERY 8 HOURS PRN
Qty: 40 TABLET | Refills: 0 | Status: SHIPPED | OUTPATIENT
Start: 2018-02-20 | End: 2018-07-16 | Stop reason: SDUPTHER

## 2018-02-20 RX ORDER — IBUPROFEN 800 MG/1
800 TABLET ORAL EVERY 8 HOURS PRN
Qty: 40 TABLET | Refills: 0 | Status: SHIPPED | OUTPATIENT
Start: 2018-02-20 | End: 2018-02-20 | Stop reason: SDUPTHER

## 2018-02-20 NOTE — LETTER
February 20, 2018    Rika June  [address] [place] [id number]      [facility]  [facility] [address]  Phone: [phone]  Fax: [phone] Rika June    Was treated here on 02/20/2018    May Return to work/school on 02/26/2018 if symptoms improve. Recommend frequent rest from prolong walking.         Sincerely,        Vera Jasso, NP

## 2018-02-21 NOTE — PATIENT INSTRUCTIONS
Treating Tendonitis of the Foot  Your healthcare provider's first concern is to reduce your symptoms. Using ice and heat, taking medicines, and limiting activity help control pain and swelling. Follow all of your healthcare provider's instructions. Returning to activity too soon may cause your symptoms to come back.    Ice   Ice helps prevent swelling and reduce pain. Place ice on the painful area for 10 to 15 minutes. Repeat the icing several times a day.     Medicines  Your healthcare provider may tell you to take ibuprofen or other anti-inflammatory medicines. These reduce pain and swelling. Take them as directed. Dont wait until you feel pain. In more severe cases, cortisone may be injected to relieve pain.  Limiting activities  Rest allows the tissues in your foot to heal. Stay off your feet for a few days, then slowly work back into activity. If you do high-impact activities, such as running or aerobics, try other activities that place less strain on your foot. Cycling and swimming are good choices.  Date Last Reviewed: 9/21/2015  © 4220-6325 [employer]. [address] [place] [address]. All rights reserved. This information is not intended as a substitute for professional medical care. Always follow your healthcare professional's instructions.        R.I.C.E.    R.I.C.E. stands for Rest, Ice, Compression, and Elevation. Doing these things helps limit pain and swelling after an injury. R.I.C.E. also helps injuries heal faster. Use R.I.C.E. for sprains, strains, and severe bruises or bumps. Follow the tips on this handout and begin R.I.C.E. as soon as possible after an injury.  ? Rest  Pain is your bodys way of telling you to rest an injured area. Whether you have hurt an elbow, hand, foot, or knee, limiting its use will prevent further injury and help you heal.  ? Ice  Applying ice right after an injury helps prevent swelling and reduce pain. Dont place ice directly on your  skin.  · Wrap a cold pack or bag of ice in a thin cloth. Place it over the injured area.  · Ice for 10 minutes every 3 hours. Dont ice for more than 20 minutes at a time.  ? Compression  Putting pressure (compression) on an injury helps prevent swelling and provides support.  · Wrap the injured area firmly with an elastic bandage. If your hand or foot tingles, becomes discolored, or feels cold to the touch, the bandage may be too tight. Rewrap it more loosely.  · If your bandage becomes too loose, rewrap it.  · Do not wear an elastic bandage overnight.  ? Elevation  Keeping an injury elevated helps reduce swelling, pain, and throbbing. Elevation is most effective when the injury is kept elevated higher than the heart.     Call your healthcare provider if you notice any of the following:  · Fingers or toes feel numb, are cold to the touch, or change color  · Skin looks shiny or tight  · Pain, swelling, or bruising worsens and is not improved with elevation   Date Last Reviewed: 9/3/2015  © 4984-3944 [employer]. [address] [place] [address], [place] [address]. All rights reserved. This information is not intended as a substitute for professional medical care. Always follow your healthcare professional's instructions.

## 2018-02-22 NOTE — PROGRESS NOTES
Patient Name: Rika June    : 1965  MRN: [id number]    Subjective:  Rika is a 52 y.o. female who presents today for     1. 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         Past Medical History  Past Medical History:   Diagnosis Date    Allergic rhinitis     Asthma        Past Surgical History  Past Surgical History:   Procedure Laterality Date     SECTION      TUBAL LIGATION         Family History  Family History   Problem Relation Age of Onset    Breast cancer Mother     Cancer Father      throat    Diabetes Maternal Grandmother     Ovarian cancer Neg Hx     Stroke Neg Hx     Hypertension Neg Hx        Social History  Social History     Social History    Marital status: Legally      Spouse name: N/A    Number of children: N/A    Years of education: N/A     Occupational History    Not on file.     Social History Main Topics    Smoking status: Never Smoker    Smokeless tobacco: Never Used    Alcohol use No    Drug use: No    Sexual activity: Yes     Partners: Male     Other Topics Concern    Are You Pregnant Or Think You May Be? No    Breast-Feeding No     Social History Narrative    No narrative on file       Allergies  Review of patient's allergies indicates:   Allergen Reactions    Hydrocodone-acetaminophen Nausea Only     Other reaction(s): Nausea    -reviewed and updated      Medications  Reviewed and updated.   Current Outpatient Prescriptions   Medication Sig Dispense Refill    albuterol (PROVENTIL) 2.5 mg /3 mL (0.083 %) nebulizer solution Take 3 mLs (2.5 mg total) by nebulization every 6 (six) hours as needed. 1 Box 0    albuterol 90 mcg/actuation inhaler Inhale 2 puffs into the lungs every 6 (six) hours as needed for Wheezing. Rescue 18 g 3    fluticasone (FLONASE) 50  "mcg/actuation nasal spray 2 sprays by Each Nare route once daily. 16 g 0    levocetirizine (XYZAL) 5 MG tablet Take 1 tablet (5 mg total) by mouth every evening. 30 tablet 0    ibuprofen (ADVIL,MOTRIN) 800 MG tablet Take 1 tablet (800 mg total) by mouth every 8 (eight) hours as needed for Pain. 40 tablet 0    valacyclovir (VALTREX) 1000 MG tablet Take 1 tablet (1,000 mg total) by mouth 2 (two) times daily. 20 tablet 0     No current facility-administered medications for this visit.          Review of Systems   Musculoskeletal: Positive for joint pain.         Physical Exam  BP (!) 146/88 (BP Location: Right arm, Patient Position: Sitting)   Pulse 76   Temp 97.9 °F (36.6 °C) (Oral)   Ht 4' 11" (1.499 m)   Wt 72 kg (158 lb 11.7 oz)   SpO2 99%   BMI 32.06 kg/m²   Physical Exam   Constitutional: She appears well-developed. No distress.   Pulmonary/Chest: Effort normal.   Musculoskeletal:   Limping, left lateral malleolar tenderness and swelling or hypertrophied tissue compare to right foot   Skin: She is not diaphoretic.         Assessment/Plan:  Rika June is a 52 y.o. female who presents today for :    Left ankle tendonitis  Advise RICE therapy, arch support, consider physicaltherapy or podiatry if no improvement  -     X-Ray Ankle Complete Left; Future; Expected date: 02/20/2018  -     ibuprofen (ADVIL,MOTRIN) 800 MG tablet; Take 1 tablet (800 mg total) by mouth every 8 (eight) hours as needed for Pain.  Dispense: 40 tablet; Refill: 0        Follow-up if symptoms worsen or fail to improve.      "

## 2018-06-22 DIAGNOSIS — Z12.39 BREAST CANCER SCREENING: ICD-10-CM

## 2018-07-16 ENCOUNTER — OFFICE VISIT (OUTPATIENT)
Dept: FAMILY MEDICINE | Facility: CLINIC | Age: 53
End: 2018-07-16
Payer: COMMERCIAL

## 2018-07-16 VITALS
HEART RATE: 96 BPM | TEMPERATURE: 98 F | DIASTOLIC BLOOD PRESSURE: 80 MMHG | WEIGHT: 158.94 LBS | SYSTOLIC BLOOD PRESSURE: 132 MMHG | BODY MASS INDEX: 32.04 KG/M2 | HEIGHT: 59 IN | OXYGEN SATURATION: 96 %

## 2018-07-16 DIAGNOSIS — M54.41 ACUTE RIGHT-SIDED LOW BACK PAIN WITH RIGHT-SIDED SCIATICA: Primary | ICD-10-CM

## 2018-07-16 PROCEDURE — 99214 OFFICE O/P EST MOD 30 MIN: CPT | Mod: 25,S$GLB,, | Performed by: NURSE PRACTITIONER

## 2018-07-16 PROCEDURE — 3075F SYST BP GE 130 - 139MM HG: CPT | Mod: CPTII,S$GLB,, | Performed by: NURSE PRACTITIONER

## 2018-07-16 PROCEDURE — 3008F BODY MASS INDEX DOCD: CPT | Mod: CPTII,S$GLB,, | Performed by: NURSE PRACTITIONER

## 2018-07-16 PROCEDURE — 99999 PR PBB SHADOW E&M-EST. PATIENT-LVL IV: CPT | Mod: PBBFAC,,, | Performed by: NURSE PRACTITIONER

## 2018-07-16 PROCEDURE — 3079F DIAST BP 80-89 MM HG: CPT | Mod: CPTII,S$GLB,, | Performed by: NURSE PRACTITIONER

## 2018-07-16 PROCEDURE — 96372 THER/PROPH/DIAG INJ SC/IM: CPT | Mod: S$GLB,,, | Performed by: NURSE PRACTITIONER

## 2018-07-16 RX ORDER — IBUPROFEN 800 MG/1
800 TABLET ORAL EVERY 8 HOURS PRN
Qty: 40 TABLET | Refills: 0 | Status: SHIPPED | OUTPATIENT
Start: 2018-07-16 | End: 2018-08-08 | Stop reason: SDUPTHER

## 2018-07-16 RX ORDER — CYCLOBENZAPRINE HCL 10 MG
10 TABLET ORAL 3 TIMES DAILY PRN
Qty: 30 TABLET | Refills: 0 | Status: SHIPPED | OUTPATIENT
Start: 2018-07-16 | End: 2018-07-26

## 2018-07-16 RX ORDER — KETOROLAC TROMETHAMINE 30 MG/ML
60 INJECTION, SOLUTION INTRAMUSCULAR; INTRAVENOUS
Status: COMPLETED | OUTPATIENT
Start: 2018-07-16 | End: 2018-07-16

## 2018-07-16 RX ADMIN — KETOROLAC TROMETHAMINE 60 MG: 30 INJECTION, SOLUTION INTRAMUSCULAR; INTRAVENOUS at 04:07

## 2018-07-16 NOTE — PROGRESS NOTES
Subjective:       Patient ID: Rika June is a 52 y.o. female.    Chief Complaint: Low-back Pain (since Thursday)    Low-back Pain   This is a new problem. The current episode started in the past 7 days. The problem occurs constantly. The problem has been unchanged. Associated symptoms include myalgias. Pertinent negatives include no abdominal pain, anorexia, arthralgias, change in bowel habit, chest pain, chills, coughing, fatigue, headaches, joint swelling, numbness or weakness. The symptoms are aggravated by walking and standing (movement ). She has tried NSAIDs for the symptoms. The treatment provided mild relief.     Review of Systems   Constitutional: Negative for chills and fatigue.   Respiratory: Negative for cough.    Cardiovascular: Negative for chest pain.   Gastrointestinal: Negative for abdominal pain, anorexia and change in bowel habit.   Musculoskeletal: Positive for myalgias. Negative for arthralgias and joint swelling.   Neurological: Negative for weakness, numbness and headaches.       Objective:      Physical Exam   Constitutional: She is oriented to person, place, and time. She appears well-developed and well-nourished.   Cardiovascular: Normal rate, regular rhythm and normal heart sounds.    Pulmonary/Chest: Effort normal and breath sounds normal.   Musculoskeletal:        Right shoulder: She exhibits decreased range of motion, tenderness and pain. She exhibits no bony tenderness, no deformity, no laceration, no spasm, normal pulse and normal strength.        Lumbar back: She exhibits decreased range of motion, tenderness, pain and spasm. She exhibits no bony tenderness, no swelling, no edema and no laceration.   Neurological: She is alert and oriented to person, place, and time.   Skin: Skin is warm and dry.   Psychiatric: She has a normal mood and affect.       Assessment:       1. Acute right-sided low back pain with right-sided sciatica        Plan:       Rika was seen today for  low-back pain.    Diagnoses and all orders for this visit:    Acute right-sided low back pain with right-sided sciatica  -     ketorolac injection 60 mg; Inject 2 mLs (60 mg total) into the muscle one time.  -     ibuprofen (ADVIL,MOTRIN) 800 MG tablet; Take 1 tablet (800 mg total) by mouth every 8 (eight) hours as needed for Pain.  -     cyclobenzaprine (FLEXERIL) 10 MG tablet; Take 1 tablet (10 mg total) by mouth 3 (three) times daily as needed for Muscle spasms.    Home care  Follow these tips when caring for yourself at home:  · You may need to stay in bed the first few days. But as soon as possible, begin sitting up or walking. This will help you avoid problems that come from staying in bed for long periods.  · When in bed, try to find a position that is comfortable. A firm mattress is best. Try lying flat on your back with pillows under your knees. You can also try lying on your side with your knees bent up toward your chest and a pillow between your knees.  · Avoid sitting for long periods. This puts more stress on your lower back than standing or walking.  · Use heat from a hot shower, hot bath, or heating pad to help ease pain. Massage can also help. You can also try using an ice pack. You can make your own ice pack by putting ice cubes in a plastic bag. Wrap the bag in a thin towel. Try both heat and cold to see which works best. Use the method that feels best for 20 minutes several times a day.  · You may use acetaminophen or ibuprofen to ease pain, unless another pain medicine was prescribed. Note: If you have chronic liver or kidney disease, talk with your healthcare provider before taking these medicines. Also talk with your provider if youve had a stomach ulcer or gastrointestinal bleeding.  · Use safe lifting methods. Dont lift anything heavier than 15 pounds until all of the pain is gone.  Follow-up care  Follow up with your healthcare provider, or as advised. You may need physical therapy or  additional tests.  If X-rays were taken, a radiologist will look at them. You will be told of any new findings that may affect your care.  When to seek medical advice  Call your healthcare provider right away if any of these occur:  · Pain gets worse even after taking prescribed medicine  · Weakness or numbness in 1 or both legs or hips  · Numbness in your groin or genital area  · You cant control your bowel or bladder  · Fever  · Redness or swelling over your back or spine   Date Last Reviewed: 8/1/2016  © 3256-4998 [employer]. [address] [place] [address]. All rights reserved. This information is not intended as a substitute for professional medical care. Always follow your healthcare professional's instructions.

## 2018-07-16 NOTE — PATIENT INSTRUCTIONS

## 2018-07-17 ENCOUNTER — TELEPHONE (OUTPATIENT)
Dept: FAMILY MEDICINE | Facility: CLINIC | Age: 53
End: 2018-07-17

## 2018-07-17 NOTE — LETTER
July 17, 2018      [facility]  [address] [facility]  [place] [address]  Phone: [phone]  Fax: [phone]       Patient: Rika June   YOB: 1965  Date of Visit: 07/16/18    To Whom It May Concern:    Jory June  was at [facility] on 07/16/18.  SHE may return to work/school on 07/18/18 with no restrictions. If you have any questions or concerns, or if I can be of further assistance, please do not hesitate to contact me.    Sincerely,    Rowdy White LPN

## 2018-08-08 ENCOUNTER — OFFICE VISIT (OUTPATIENT)
Dept: FAMILY MEDICINE | Facility: CLINIC | Age: 53
End: 2018-08-08
Payer: COMMERCIAL

## 2018-08-08 VITALS
WEIGHT: 160.25 LBS | TEMPERATURE: 98 F | BODY MASS INDEX: 32.31 KG/M2 | DIASTOLIC BLOOD PRESSURE: 80 MMHG | HEART RATE: 84 BPM | HEIGHT: 59 IN | OXYGEN SATURATION: 97 % | SYSTOLIC BLOOD PRESSURE: 124 MMHG
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
## 2018-08-08 DIAGNOSIS — M54.41 ACUTE RIGHT-SIDED LOW BACK PAIN WITH RIGHT-SIDED SCIATICA: Primary | ICD-10-CM

## 2018-08-08 PROCEDURE — 99999 PR PBB SHADOW E&M-EST. PATIENT-LVL III: CPT | Mod: PBBFAC,,, | Performed by: NURSE PRACTITIONER

## 2018-08-08 PROCEDURE — 3074F SYST BP LT 130 MM HG: CPT | Mod: CPTII,S$GLB,, | Performed by: NURSE PRACTITIONER

## 2018-08-08 PROCEDURE — 3079F DIAST BP 80-89 MM HG: CPT | Mod: CPTII,S$GLB,, | Performed by: NURSE PRACTITIONER

## 2018-08-08 PROCEDURE — 3008F BODY MASS INDEX DOCD: CPT | Mod: CPTII,S$GLB,, | Performed by: NURSE PRACTITIONER

## 2018-08-08 PROCEDURE — 99214 OFFICE O/P EST MOD 30 MIN: CPT | Mod: S$GLB,,, | Performed by: NURSE PRACTITIONER

## 2018-08-08 RX ORDER — METHOCARBAMOL 750 MG/1
750 TABLET, FILM COATED ORAL 4 TIMES DAILY
Qty: 40 TABLET | Refills: 0 | Status: SHIPPED | OUTPATIENT
Start: 2018-08-08 | End: 2018-08-18

## 2018-08-08 RX ORDER — IBUPROFEN 800 MG/1
800 TABLET ORAL EVERY 8 HOURS PRN
Qty: 40 TABLET | Refills: 0 | Status: SHIPPED | OUTPATIENT
Start: 2018-08-08 | End: 2019-03-25

## 2018-08-08 RX ORDER — TRIAMCINOLONE ACETONIDE 0.25 MG/G
CREAM TOPICAL 2 TIMES DAILY
Qty: 80 G | Refills: 0 | Status: SHIPPED | OUTPATIENT
Start: 2018-08-08 | End: 2018-08-18

## 2018-08-08 NOTE — PROGRESS NOTES
Subjective:       Patient ID: Rika June is a 52 y.o. female.    Chief Complaint: Low-back Pain    Low-back Pain   This is a new problem. The current episode started yesterday. The problem occurs constantly. The problem has been gradually worsening. Associated symptoms include myalgias. Pertinent negatives include no abdominal pain, anorexia, arthralgias, change in bowel habit, chest pain, chills, coughing, fatigue, headaches, joint swelling, numbness or weakness. The symptoms are aggravated by walking and standing (movement ). She has tried NSAIDs for the symptoms. The treatment provided mild relief.     Review of Systems   Constitutional: Negative for chills and fatigue.   Respiratory: Negative for cough and chest tightness.    Cardiovascular: Negative for chest pain.   Gastrointestinal: Negative for abdominal pain, anorexia and change in bowel habit.   Genitourinary: Negative for flank pain and urgency.   Musculoskeletal: Positive for back pain and myalgias. Negative for arthralgias and joint swelling.   Neurological: Negative for weakness, numbness and headaches.   Hematological: Negative for adenopathy. Does not bruise/bleed easily.       Objective:      Physical Exam   Constitutional: She is oriented to person, place, and time. She appears well-developed and well-nourished.   Cardiovascular: Normal rate, regular rhythm and normal heart sounds.    Pulmonary/Chest: Effort normal and breath sounds normal.   Musculoskeletal:        Lumbar back: She exhibits decreased range of motion, tenderness, pain and spasm. She exhibits no bony tenderness, no swelling, no edema and no laceration.   Positive straight leg raises pain elicited  on right side    Neurological: She is alert and oriented to person, place, and time.   Skin: Skin is warm and dry.   Psychiatric: She has a normal mood and affect.       Assessment:       1. Acute right-sided low back pain with right-sided sciatica    2. Insect bite, initial encounter         Plan:       Rika was seen today for low-back pain.    Diagnoses and all orders for this visit:    Acute right-sided low back pain with right-sided sciatica  -     ibuprofen (ADVIL,MOTRIN) 800 MG tablet; Take 1 tablet (800 mg total) by mouth every 8 (eight) hours as needed for Pain.  -     methocarbamol (ROBAXIN) 750 MG Tab; Take 1 tablet (750 mg total) by mouth 4 (four) times daily. for 10 days  Home care  Follow these tips when caring for yourself at home:  · You may need to stay in bed the first few days. But as soon as possible, begin sitting up or walking. This will help you avoid problems that come from staying in bed for long periods.  · When in bed, try to find a position that is comfortable. A firm mattress is best. Try lying flat on your back with pillows under your knees. You can also try lying on your side with your knees bent up toward your chest and a pillow between your knees.  · Avoid sitting for long periods. This puts more stress on your lower back than standing or walking.  · Use heat from a hot shower, hot bath, or heating pad to help ease pain. Massage can also help. You can also try using an ice pack. You can make your own ice pack by putting ice cubes in a plastic bag. Wrap the bag in a thin towel. Try both heat and cold to see which works best. Use the method that feels best for 20 minutes several times a day.  · You may use acetaminophen or ibuprofen to ease pain, unless another pain medicine was prescribed. Note: If you have chronic liver or kidney disease, talk with your healthcare provider before taking these medicines. Also talk with your provider if youve had a stomach ulcer or gastrointestinal bleeding.  · Use safe lifting methods. Dont lift anything heavier than 15 pounds until all of the pain is gone.  Follow-up care  Follow up with your healthcare provider, or as advised. You may need physical therapy or additional tests.  If X-rays were taken, a radiologist will look at  them. You will be told of any new findings that may affect your care.  When to seek medical advice  Call your healthcare provider right away if any of these occur:  · Pain gets worse even after taking prescribed medicine  · Weakness or numbness in 1 or both legs or hips  · Numbness in your groin or genital area  · You cant control your bowel or bladder  · Fever  · Redness or swelling over your back or spine   Date Last Reviewed: 8/1/2016  © 6976-7786 [employer]. [address] [place] [address]. All rights reserved. This information is not intended as a substitute for professional medical care. Always follow your healthcare professional's instructions.        Insect bite, initial encounter  -     triamcinolone acetonide 0.025% (KENALOG) 0.025 % cream; Apply topically 2 (two) times daily. for 10 days

## 2018-08-08 NOTE — LETTER
August 8, 2018      [facility]  [address] [facility]  [place] [address]  Phone: [phone]  Fax: [phone]       Patient: Rika June   YOB: 1965  Date of Visit: 08/08/2018    To Whom It May Concern:    Jory June  was at [facility] on 08/08/2018. She may return to work/school on 08/10/2018 with no restrictions. If you have any questions or concerns, or if I can be of further assistance, please do not hesitate to contact me.    Sincerely,      Scott Curtis NP

## 2018-08-08 NOTE — PATIENT INSTRUCTIONS

## 2018-08-22 ENCOUNTER — OFFICE VISIT (OUTPATIENT)
Dept: DERMATOLOGY | Facility: CLINIC | Age: 53
End: 2018-08-22
Payer: COMMERCIAL

## 2018-08-22 DIAGNOSIS — L25.9 CONTACT DERMATITIS, UNSPECIFIED CONTACT DERMATITIS TYPE, UNSPECIFIED TRIGGER: Primary | ICD-10-CM

## 2018-08-22 PROCEDURE — 99999 PR PBB SHADOW E&M-EST. PATIENT-LVL II: CPT | Mod: PBBFAC,,, | Performed by: DERMATOLOGY

## 2018-08-22 PROCEDURE — 99213 OFFICE O/P EST LOW 20 MIN: CPT | Mod: S$GLB,,, | Performed by: DERMATOLOGY

## 2018-08-22 RX ORDER — TRIAMCINOLONE ACETONIDE 1 MG/G
CREAM TOPICAL 2 TIMES DAILY
Qty: 80 G | Refills: 1 | Status: SHIPPED | OUTPATIENT
Start: 2018-08-22 | End: 2019-03-25 | Stop reason: SDUPTHER

## 2018-08-22 RX ORDER — CLINDAMYCIN PHOSPHATE AND BENZOYL PEROXIDE 10; 50 MG/G; MG/G
GEL TOPICAL DAILY
Qty: 45 G | Refills: 1 | Status: SHIPPED | OUTPATIENT
Start: 2018-08-22 | End: 2019-06-03

## 2018-08-22 RX ORDER — METHOCARBAMOL 750 MG/1
TABLET, FILM COATED ORAL
Refills: 0 | COMMUNITY
Start: 2018-08-08 | End: 2019-03-25

## 2018-08-22 NOTE — PROGRESS NOTES
Subjective:       Patient ID:  Rika June is a 53 y.o. female who presents for   Chief Complaint   Patient presents with    Rash     f/u refill on medication    Rash     face, x yr, dark spots, no tx     Ms. June is a 54 Yo F who presents with complaint of rash.     She states that a few weeks ago, she was cutting grass and developed blisters on her arms that looked like a rash she had previously that was dx'd as poison ivy. She applied topical abx ointment which resolved the rash. She would like to know if she can get a cream for future similar rashes.    She also complains of bumps on face. They have been there for months. She endorses picking it. No treatments tried.       Rash  - Initial  Affected locations: chest, left arm, right arm and face  Duration: months on chest & arms, yrs on face.  Signs and Symptoms: breakouts.  Relieving factors/Treatments tried: Rx topical steroids    in 2016 required oral prednisone for contact dermatitis.  Rash now gone, would like refill of TAC if it happens again.  She has not yet cleared the poison ivy from her yard.  Also has questions about skin care; has bumps on her chin that she picks at.  She got neutrogena oil free acne wash and is using.    Review of Systems   Skin: Negative for itching and rash.   Hematologic/Lymphatic: Does not bruise/bleed easily.        Objective:    Physical Exam   Constitutional: She appears well-developed and well-nourished. No distress.   Neurological: She is alert and oriented to person, place, and time. She is not disoriented.   Psychiatric: She has a normal mood and affect.   Skin:   Areas Examined (abnormalities noted in diagram):   Head / Face Inspection Performed  Neck Inspection Performed  Chest / Axilla Inspection Performed  Back Inspection Performed  RUE Inspected  LUE Inspection Performed                   Diagram Legend     Erythematous scaling macule/papule c/w actinic keratosis       Vascular papule c/w angioma       Pigmented verrucoid papule/plaque c/w seborrheic keratosis      Yellow umbilicated papule c/w sebaceous hyperplasia      Irregularly shaped tan macule c/w lentigo     1-2 mm smooth white papules consistent with Milia      Movable subcutaneous cyst with punctum c/w epidermal inclusion cyst      Subcutaneous movable cyst c/w pilar cyst      Firm pink to brown papule c/w dermatofibroma      Pedunculated fleshy papule(s) c/w skin tag(s)      Evenly pigmented macule c/w junctional nevus     Mildly variegated pigmented, slightly irregular-bordered macule c/w mildly atypical nevus      Flesh colored to evenly pigmented papule c/w intradermal nevus       Pink pearly papule/plaque c/w basal cell carcinoma      Erythematous hyperkeratotic cursted plaque c/w SCC      Surgical scar with no sign of skin cancer recurrence      Open and closed comedones      Inflammatory papules and pustules      Verrucoid papule consistent consistent with wart     Erythematous eczematous patches and plaques     Dystrophic onycholytic nail with subungual debris c/w onychomycosis     Umbilicated papule    Erythematous-base heme-crusted tan verrucoid plaque consistent with inflamed seborrheic keratosis     Erythematous Silvery Scaling Plaque c/w Psoriasis     See annotation      Assessment / Plan:        Contact dermatitis, poison ivy - advise patient have someone else clear for her in her yard given her sensitivity - also needs to wash clothes immediately after any exposure including gloves  -     triamcinolone acetonide 0.1% (KENALOG) 0.1 % cream; Apply topically 2 (two) times daily. PRN itchy rash for 10 days  Dispense: 80 g; Refill: 1    Acne folliculitis chin -   Repeated picking, rubbing, and scratching of the skin can exacerbate the condition and lead to pigmentary changes and scarring.  The patient was urged to stop these behaviors.    -     clindamycin-benzoyl peroxide gel; Apply topically once daily. To acne  Dispense: 45 g; Refill: 1  If  not covered, continue oil free acne wash by neutrogena, and add 10% topical BP gel  Daily spf 30 such as Cerave AM         Follow-up if symptoms worsen or fail to improve.

## 2018-11-09 DIAGNOSIS — Z12.11 COLON CANCER SCREENING: ICD-10-CM

## 2018-11-26 ENCOUNTER — OFFICE VISIT (OUTPATIENT)
Dept: OPTOMETRY | Facility: CLINIC | Age: 53
End: 2018-11-26
Payer: OTHER MISCELLANEOUS

## 2018-11-26 DIAGNOSIS — H52.4 PRESBYOPIA OF BOTH EYES: ICD-10-CM

## 2018-11-26 DIAGNOSIS — H52.03 HYPERMETROPIA OF BOTH EYES: ICD-10-CM

## 2018-11-26 DIAGNOSIS — Z13.5 SCREENING FOR EYE CONDITION: ICD-10-CM

## 2018-11-26 DIAGNOSIS — Z01.00 EXAMINATION OF EYES AND VISION: Primary | ICD-10-CM

## 2018-11-26 PROCEDURE — 99999 PR PBB SHADOW E&M-EST. PATIENT-LVL III: CPT | Mod: PBBFAC,,, | Performed by: OPTOMETRIST

## 2018-11-26 PROCEDURE — 92004 COMPRE OPH EXAM NEW PT 1/>: CPT | Mod: S$GLB,,, | Performed by: OPTOMETRIST

## 2018-11-26 PROCEDURE — 92015 DETERMINE REFRACTIVE STATE: CPT | Mod: S$GLB,,, | Performed by: OPTOMETRIST

## 2018-11-26 NOTE — PATIENT INSTRUCTIONS
No evidence of acute problem in either eye.  No sign of problem in the right eye to explain symptoms of transient redness and puffiness.  Advised Ms. June to return when the symptoms are present for evaluation.    Generally, good ocular health in both eyes.  Hyperopia in each eye, with presbyopia consistent with age.  Spectacle lens Rx issued for use as desired.  Return for recheck in 12 - 18 months - or prior, as noted above.

## 2018-11-26 NOTE — PROGRESS NOTES
This note was created by combination of typed  and Dragon dictation.  Transcription errors may be present.  If there are any questions, please contact me.    Assessment / Plan:   Acute otalgia, right  -normal exam.  No evidence of otitis media, otitis externa.  No audible temporal artery carotid bruit or common carotid bruit.  No associated ongoing headache.  Just really sound of her heartbeat in her ear with some intermittent discomfort and it is the washing of her heartbeat that is most problematic for her.  No other symptoms to suggest respiratory infection.  Middle ear pathology versus vascular pathology.  At this time I would just monitor.  Trial of Atrovent nasal spray.  If she fails to improve in about a week or so she can contact me and I would consider CT angio to rule out aneurysm.  We had a long discussion about this.  -     ipratropium (ATROVENT) 42 mcg (0.06 %) nasal spray; 2 sprays by Nasal route 3 (three) times daily. AS NEEDED FOR NASAL CONGESTION AND DRIP for 7 days  Dispense: 30 mL; Refill: 0      There are no discontinued medications.    meds sent this encounter:  Medications Ordered This Encounter   Medications    ipratropium (ATROVENT) 42 mcg (0.06 %) nasal spray     Si sprays by Nasal route 3 (three) times daily. AS NEEDED FOR NASAL CONGESTION AND DRIP for 7 days     Dispense:  30 mL     Refill:  0       Follow Up: No Follow-up on file.      Subjective:     Chief Complaint   Patient presents with    Otalgia     right       HPI  Rika is a 53 y.o. female, last appointment with this clinic was 2018.    No LMP recorded. Patient is premenopausal.    Pt of Dr. Santo.  Followed for HTN; asthma; goiter    Past few days - right ear with some mild pain and she can hear her heart beat.  No other symptoms occurring with this. Maybe an initial headache but did not last.  No sore throat/sinus pain/congestion, no cough.  No fever no chills. No hx of impacted cerumen.  Tried some  peroxide drops in the ear without relief. No facial numbness or swelling. The accompanying pain is dull. Not constant. Nothing worsens or improves this.  If she lies down on one side it helps. B/c she can't hear anything out of that ear if she lays on that side     Patient Care Team:  Elizabeth Santo MD as PCP - General (Internal Medicine)    Patient Active Problem List    Diagnosis Date Noted    Vitamin D deficiency 10/15/2013    Thyroid nodule 2012    Goiter, unspecified 2012    Asthma with exacerbation 2012    Essential hypertension 2012       PAST MEDICAL HISTORY:  Past Medical History:   Diagnosis Date    Allergic rhinitis     Asthma        PAST SURGICAL HISTORY:  Past Surgical History:   Procedure Laterality Date     SECTION      TUBAL LIGATION         SOCIAL HISTORY:  Social History     Socioeconomic History    Marital status: Legally      Spouse name: Not on file    Number of children: Not on file    Years of education: Not on file    Highest education level: Not on file   Social Needs    Financial resource strain: Not on file    Food insecurity - worry: Not on file    Food insecurity - inability: Not on file    Transportation needs - medical: Not on file    Transportation needs - non-medical: Not on file   Occupational History    Not on file   Tobacco Use    Smoking status: Never Smoker    Smokeless tobacco: Never Used   Substance and Sexual Activity    Alcohol use: No    Drug use: No    Sexual activity: Yes     Partners: Male   Other Topics Concern    Are you pregnant or think you may be? No    Breast-feeding No   Social History Narrative    Not on file       ALLERGIES AND MEDICATIONS: updated and reviewed.  Review of patient's allergies indicates:   Allergen Reactions    Hydrocodone-acetaminophen Nausea Only     Other reaction(s): Nausea     Current Outpatient Medications   Medication Sig Dispense Refill    albuterol (PROVENTIL) 2.5 mg  "/3 mL (0.083 %) nebulizer solution Take 3 mLs (2.5 mg total) by nebulization every 6 (six) hours as needed. 1 Box 0    albuterol 90 mcg/actuation inhaler Inhale 2 puffs into the lungs every 6 (six) hours as needed for Wheezing. Rescue 18 g 3    clindamycin-benzoyl peroxide gel Apply topically once daily. To acne 45 g 1    fluticasone (FLONASE) 50 mcg/actuation nasal spray 2 sprays by Each Nare route once daily. 16 g 0    ibuprofen (ADVIL,MOTRIN) 800 MG tablet Take 1 tablet (800 mg total) by mouth every 8 (eight) hours as needed for Pain. 40 tablet 0    levocetirizine (XYZAL) 5 MG tablet Take 1 tablet (5 mg total) by mouth every evening. 30 tablet 0    methocarbamol (ROBAXIN) 750 MG Tab   0    triamcinolone acetonide 0.1% (KENALOG) 0.1 % cream Apply topically 2 (two) times daily. PRN itchy rash for 10 days 80 g 1    valacyclovir (VALTREX) 1000 MG tablet Take 1 tablet (1,000 mg total) by mouth 2 (two) times daily. 20 tablet 0     No current facility-administered medications for this visit.        Review of Systems   All other systems reviewed and are negative.      Objective:   Physical Exam   Vitals:    11/27/18 0836   BP: 124/84   Pulse: 72   Temp: 98.8 °F (37.1 °C)   SpO2: 98%   Weight: 72.1 kg (158 lb 15.2 oz)   Height: 4' 11" (1.499 m)    Body mass index is 32.1 kg/m².  Weight: 72.1 kg (158 lb 15.2 oz)   Height: 4' 11" (149.9 cm)     Physical Exam   Constitutional: She is oriented to person, place, and time. She appears well-developed and well-nourished.   HENT:   TMs grey/clear bilaterally.  OP no erythema no exudates   Eyes: EOM are normal.   Neck: Neck supple.   Cardiovascular: Normal rate, regular rhythm and normal heart sounds.   Right side no common carotid bruit.  Right temporal artery nontender, normal pulse, no bruit   Pulmonary/Chest: Effort normal and breath sounds normal. She has no wheezes.   Lymphadenopathy:     She has no cervical adenopathy.   Neurological: She is alert and oriented to " person, place, and time.   Skin: Skin is warm and dry.   Psychiatric: She has a normal mood and affect. Her behavior is normal.

## 2018-11-26 NOTE — PROGRESS NOTES
"HPI     Concerns About Ocular Health      Additional comments: Right eye irritated, and seems puffy and red.  photsensitivity in the right eye, with "sharp pain every now and then.     Note apparent injury to the right eye at work in June 2018.              Comments       Pt comes in today as a Workmen's Comp case. Pt states that while on the   job something flew in OD while on duty in June 2018. Pt reports being seen   by outside doctor at [facility] workmen's [facility]. OD was swollen, red and   tearing. Pt states at that time pain scale was a 8. Occasional pain and   light sensitivity. Edwina was given to use for 7 days.           Patient's age: 53 y.o. AA female.   Occupation:   Approximate date of last eye examination: [facility] visit  06/15/2018 - was   given ointment for use in the right eye.  Did not bring with her today.    Has run out, and does not have the tube at home.   Name of last eye doctor seen:   City/State: [place]   Wears glasses? No, just to read.     If yes, wears  Full-time or part-time?  Part time   Present glasses are: Bifocal, SV Distance, SV Reading?  Progressive   Approximate age of present glasses:  1 yr   Got new glasses following last exam, or subsequently?:  Yes    Any problem with VA with glasses?  No   Wears CLs?:    Headaches?  No   Eye pain/discomfort?  Yes, intermittent, irritation and redness                                                                                     Flashes?  No   Floaters?  no  Diplopia/Double vision?  No   Patient's Ocular History:         Any eye surgeries? No          Any eye injury?  No          Any treatment for eye disease?  no  Family history of eye disease?  No   Significant patient medical history:         1. Diabetes?  No        If yes, IDDM or NIDDM? n/a   2. HBP?  No               3. Other (describe):  No    ! OTC eyedrops currently using:  No    ! Prescription eye meds currently using:  No    ! Any history of allergy/adverse reaction to " "any eye meds used   previously?  No    ! Any history of allergy/adverse reaction to eyedrops used during prior   eye exam(s)? No    ! Any history of allergy/adverse reaction to Novacaine or similar meds?   No    ! Any history of allergy/adverse reaction to Epinephrine or similar meds?   No     ! Patient okay with use of anesthetic eyedrops to check eye pressure?    yes       ! Patient okay with use of eyedrops to dilate pupils today?  No, pt   wishes not to be dilated    !  Allergies/Medications/Medical History/Family History reviewed today?    yes      PD =   68/64  Desired reading distance =  14"    Auto refraction: OD +0.75 DS                         OS +0.75 DS                                                                     Last edited by Riaz Swain, OD on 11/26/2018  3:18 PM. (History)            Assessment /Plan     For exam results, see Encounter Report.    1. Examination of eyes and vision     2. Screening for eye condition     3. Hypermetropia of both eyes     4. Presbyopia of both eyes                  No evidence of acute problem in either eye.  No sign of problem in the right eye to explain symptoms of transient redness and puffiness.  Advised Ms. June to return when the symptoms are present for evaluation.    Generally, good ocular health in both eyes.  Hyperopia in each eye, with presbyopia consistent with age.  Spectacle lens Rx issued for use as desired.  Return for recheck in 12 - 18 months - or prior, as noted above.       "

## 2018-11-27 ENCOUNTER — OFFICE VISIT (OUTPATIENT)
Dept: CARDIOLOGY | Facility: CLINIC | Age: 53
End: 2018-11-27
Payer: COMMERCIAL

## 2018-11-27 ENCOUNTER — OFFICE VISIT (OUTPATIENT)
Dept: FAMILY MEDICINE | Facility: CLINIC | Age: 53
End: 2018-11-27
Payer: COMMERCIAL

## 2018-11-27 VITALS
WEIGHT: 158 LBS | OXYGEN SATURATION: 97 % | HEART RATE: 84 BPM | SYSTOLIC BLOOD PRESSURE: 134 MMHG | DIASTOLIC BLOOD PRESSURE: 78 MMHG | RESPIRATION RATE: 15 BRPM | HEIGHT: 59 IN | BODY MASS INDEX: 31.85 KG/M2

## 2018-11-27 VITALS
BODY MASS INDEX: 32.04 KG/M2 | HEIGHT: 59 IN | TEMPERATURE: 99 F | WEIGHT: 158.94 LBS | DIASTOLIC BLOOD PRESSURE: 84 MMHG | SYSTOLIC BLOOD PRESSURE: 124 MMHG | OXYGEN SATURATION: 98 % | HEART RATE: 72 BPM

## 2018-11-27 DIAGNOSIS — R94.31 ABNORMAL EKG: Primary | ICD-10-CM

## 2018-11-27 DIAGNOSIS — E66.9 NON MORBID OBESITY, UNSPECIFIED OBESITY TYPE: ICD-10-CM

## 2018-11-27 DIAGNOSIS — H92.01 ACUTE OTALGIA, RIGHT: Primary | ICD-10-CM

## 2018-11-27 PROCEDURE — 3008F BODY MASS INDEX DOCD: CPT | Mod: CPTII,S$GLB,, | Performed by: INTERNAL MEDICINE

## 2018-11-27 PROCEDURE — 3074F SYST BP LT 130 MM HG: CPT | Mod: CPTII,S$GLB,, | Performed by: INTERNAL MEDICINE

## 2018-11-27 PROCEDURE — 99999 PR PBB SHADOW E&M-EST. PATIENT-LVL III: CPT | Mod: PBBFAC,,, | Performed by: INTERNAL MEDICINE

## 2018-11-27 PROCEDURE — 93005 ELECTROCARDIOGRAM TRACING: CPT | Mod: S$GLB,,, | Performed by: INTERNAL MEDICINE

## 2018-11-27 PROCEDURE — 93010 ELECTROCARDIOGRAM REPORT: CPT | Mod: S$GLB,,, | Performed by: INTERNAL MEDICINE

## 2018-11-27 PROCEDURE — 3079F DIAST BP 80-89 MM HG: CPT | Mod: CPTII,S$GLB,, | Performed by: INTERNAL MEDICINE

## 2018-11-27 PROCEDURE — 99243 OFF/OP CNSLTJ NEW/EST LOW 30: CPT | Mod: S$GLB,,, | Performed by: INTERNAL MEDICINE

## 2018-11-27 PROCEDURE — 99213 OFFICE O/P EST LOW 20 MIN: CPT | Mod: S$GLB,,, | Performed by: INTERNAL MEDICINE

## 2018-11-27 RX ORDER — IPRATROPIUM BROMIDE 42 UG/1
2 SPRAY, METERED NASAL 3 TIMES DAILY
Qty: 30 ML | Refills: 0 | Status: SHIPPED | OUTPATIENT
Start: 2018-11-27 | End: 2018-12-04

## 2018-11-27 NOTE — PROGRESS NOTES
CARDIOVASCULAR CONSULTATION    REASON FOR CONSULT:   Rika June is a 53 y.o. female who presents for eval of abnl EKG.    Req/PCP: Gal  HISTORY OF PRESENT ILLNESS:   The patient is a very pleasant 53-year-old  woman with no prior cardiac or significant past medical history presents for follow-up of and prior abnormal EKG.  She reports an abnormal EKG back in 2016, and wanted to get checked up.  She denies any angina or dyspnea.  She has had no palpitations, lightheadedness, dizziness, or syncope.  There has been no PND, orthopnea, or lower extremity edema.  She denies melena, hematuria, or claudicant symptoms.  While she does not exercise much for the sake of exercise, she is active at work and denies any symptoms while at work.    Family history is notable for the absence of premature CAD or sudden cardiac death.    The patient is a nonsmoker.    CARDIOVASCULAR HISTORY:   none    PAST MEDICAL HISTORY:     Past Medical History:   Diagnosis Date    Allergic rhinitis     Asthma        PAST SURGICAL HISTORY:     Past Surgical History:   Procedure Laterality Date     SECTION      TUBAL LIGATION         ALLERGIES AND MEDICATION:     Review of patient's allergies indicates:   Allergen Reactions    Hydrocodone-acetaminophen Nausea Only     Other reaction(s): Nausea        Medication List           Accurate as of 18  1:17 PM. If you have any questions, ask your nurse or doctor.               START taking these medications    ipratropium 42 mcg (0.06 %) nasal spray  Commonly known as:  ATROVENT  2 sprays by Nasal route 3 (three) times daily. AS NEEDED FOR NASAL CONGESTION AND DRIP for 7 days  Started by:  Rishabh Johnson MD        CONTINUE taking these medications    * albuterol 2.5 mg /3 mL (0.083 %) nebulizer solution  Commonly known as:  PROVENTIL  Take 3 mLs (2.5 mg total) by nebulization every 6 (six) hours as needed.     * albuterol 90 mcg/actuation inhaler  Commonly known as:   PROVENTIL/VENTOLIN HFA  Inhale 2 puffs into the lungs every 6 (six) hours as needed for Wheezing. Rescue     clindamycin-benzoyl peroxide gel  Apply topically once daily. To acne     fluticasone 50 mcg/actuation nasal spray  Commonly known as:  FLONASE  2 sprays by Each Nare route once daily.     ibuprofen 800 MG tablet  Commonly known as:  ADVIL,MOTRIN  Take 1 tablet (800 mg total) by mouth every 8 (eight) hours as needed for Pain.     levocetirizine 5 MG tablet  Commonly known as:  XYZAL  Take 1 tablet (5 mg total) by mouth every evening.     methocarbamol 750 MG Tab  Commonly known as:  ROBAXIN     triamcinolone acetonide 0.1% 0.1 % cream  Commonly known as:  KENALOG  Apply topically 2 (two) times daily. PRN itchy rash for 10 days     valACYclovir 1000 MG tablet  Commonly known as:  VALTREX  Take 1 tablet (1,000 mg total) by mouth 2 (two) times daily.         * This list has 2 medication(s) that are the same as other medications prescribed for you. Read the directions carefully, and ask your doctor or other care provider to review them with you.               Where to Get Your Medications      These medications were sent to [id number]  [address]  [facility] AT [facility] [address]  [facility] [place] [address] [address]    Phone:  [phone]   · ipratropium 42 mcg (0.06 %) nasal spray         SOCIAL HISTORY:     Social History     Socioeconomic History    Marital status: Legally      Spouse name: Not on file    Number of children: Not on file    Years of education: Not on file    Highest education level: Not on file   Social Needs    Financial resource strain: Not on file    Food insecurity - worry: Not on file    Food insecurity - inability: Not on file    Transportation needs - medical: Not on file    Transportation needs - non-medical: Not on file   Occupational History    Not on file   Tobacco Use    Smoking status: Never Smoker     "Smokeless tobacco: Never Used   Substance and Sexual Activity    Alcohol use: No    Drug use: No    Sexual activity: Yes     Partners: Male   Other Topics Concern    Are you pregnant or think you may be? No    Breast-feeding No   Social History Narrative    Not on file       FAMILY HISTORY:     Family History   Problem Relation Age of Onset    Breast cancer Mother     Cancer Father         throat    Diabetes Maternal Grandmother     Ovarian cancer Neg Hx     Stroke Neg Hx     Hypertension Neg Hx        REVIEW OF SYSTEMS:   Review of Systems   Constitutional: Negative for chills, diaphoresis and fever.   HENT: Negative for nosebleeds.    Eyes: Negative for blurred vision, double vision and photophobia.   Respiratory: Negative for hemoptysis, shortness of breath and wheezing.    Cardiovascular: Negative for chest pain, palpitations, orthopnea, claudication, leg swelling and PND.   Gastrointestinal: Negative for abdominal pain, blood in stool, heartburn, melena, nausea and vomiting.   Genitourinary: Negative for flank pain and hematuria.   Musculoskeletal: Negative for falls, myalgias and neck pain.   Skin: Negative for rash.   Neurological: Negative for dizziness, seizures, loss of consciousness, weakness and headaches.   Endo/Heme/Allergies: Negative for polydipsia. Does not bruise/bleed easily.   Psychiatric/Behavioral: Negative for depression and memory loss. The patient is not nervous/anxious.        PHYSICAL EXAM:     Vitals:    11/27/18 1307   BP: 134/78   Pulse: 84   Resp: 15    Body mass index is 31.91 kg/m².  Weight: 71.7 kg (158 lb)   Height: 4' 11" (149.9 cm)     Physical Exam   Constitutional: She is oriented to person, place, and time. She appears well-developed and well-nourished. She is cooperative.  Non-toxic appearance. No distress.   HENT:   Head: Normocephalic and atraumatic.   Eyes: Conjunctivae and EOM are normal. Pupils are equal, round, and reactive to light. No scleral icterus. "   Neck: Trachea normal and normal range of motion. Neck supple. Normal carotid pulses and no JVD present. Carotid bruit is not present. No neck rigidity. No tracheal deviation and no edema present. No thyromegaly present.   Cardiovascular: Normal rate, regular rhythm, S1 normal and S2 normal. PMI is not displaced. Exam reveals no gallop and no friction rub.   No murmur heard.  Pulses:       Carotid pulses are 2+ on the right side, and 2+ on the left side.  Pulmonary/Chest: Effort normal and breath sounds normal. No stridor. No respiratory distress. She has no wheezes. She has no rales. She exhibits no tenderness.   Abdominal: Soft. She exhibits no distension. There is no hepatosplenomegaly.   Musculoskeletal: Normal range of motion. She exhibits no edema or tenderness.   Feet:   Right Foot:   Skin Integrity: Negative for ulcer.   Left Foot:   Skin Integrity: Negative for ulcer.   Neurological: She is alert and oriented to person, place, and time. No cranial nerve deficit.   Skin: Skin is warm and dry. No rash noted. No erythema.   Psychiatric: She has a normal mood and affect. Her speech is normal and behavior is normal.   Vitals reviewed.      DATA:   EKG: (personally reviewed tracing)  11/27/18 SR 84, NSSTTW changes, similar to 12/14/16    Laboratory:  CBC:        CHEMISTRIES:        CARDIAC BIOMARKERS:        COAGS:        LIPIDS/LFTS:  Recent Labs   Lab 10/11/17  1451   CHOLESTEROL 150   TRIGLYCERIDES 133   HDL 41   LDL CHOLESTEROL 82.4   NON-HDL CHOLESTEROL 109       Cardiovascular Testing:  none    ASSESSMENT:   # abnl EKG, no referable sxs  # BMI 32    PLAN:   No specific cardiac rx or need for testing in absence of sxs  RTC prn    Kris Godfrey MD, FACC

## 2018-11-27 NOTE — LETTER
November 27, 2018      Elizabeth Santo MD  [address]  [place] [address]           [facility]  [address]  [place] [address]  Phone: [phone]          Patient: Rika June   MR Number: [id number]   YOB: 1965   Date of Visit: 11/27/2018       Dear Dr. Elizabeth Santo:    Thank you for referring Rika June to me for evaluation. Attached you will find relevant portions of my assessment and plan of care.    If you have questions, please do not hesitate to call me. I look forward to following Rika June along with you.    Sincerely,    Kris Godfrey MD    Enclosure  CC:  No Recipients    If you would like to receive this communication electronically, please contact [email or web address] or [phone] to request more information on [employer] Link access.    For providers and/or their staff who would like to refer a patient to [facility], please contact us through our one-stop-shop provider referral line, [facility], at [phone].    If you feel you have received this communication in error or would no longer like to receive these types of communications, please e-mail [email or web address]

## 2018-11-29 ENCOUNTER — OFFICE VISIT (OUTPATIENT)
Dept: FAMILY MEDICINE | Facility: CLINIC | Age: 53
End: 2018-11-29
Payer: COMMERCIAL

## 2018-11-29 VITALS
HEIGHT: 59 IN | OXYGEN SATURATION: 98 % | WEIGHT: 164.81 LBS | TEMPERATURE: 98 F | HEART RATE: 82 BPM | DIASTOLIC BLOOD PRESSURE: 90 MMHG | SYSTOLIC BLOOD PRESSURE: 126 MMHG | BODY MASS INDEX: 33.23 KG/M2

## 2018-11-29 DIAGNOSIS — H92.01 OTALGIA, RIGHT: Primary | ICD-10-CM

## 2018-11-29 PROCEDURE — 99211 OFF/OP EST MAY X REQ PHY/QHP: CPT | Mod: S$GLB,,, | Performed by: NURSE PRACTITIONER

## 2018-11-29 PROCEDURE — 3080F DIAST BP >= 90 MM HG: CPT | Mod: CPTII,S$GLB,, | Performed by: NURSE PRACTITIONER

## 2018-11-29 PROCEDURE — 3074F SYST BP LT 130 MM HG: CPT | Mod: CPTII,S$GLB,, | Performed by: NURSE PRACTITIONER

## 2018-11-29 PROCEDURE — 99999 PR PBB SHADOW E&M-EST. PATIENT-LVL III: CPT | Mod: PBBFAC,,, | Performed by: NURSE PRACTITIONER

## 2018-11-29 NOTE — PROGRESS NOTES
"Subjective:       Patient ID: Rika June is a 53 y.o. female.    Chief Complaint: Facial Swelling (pt states inside of right ear swelling.)    52 yo female presents for right ear with some mild pain and hearing her heart beat. She was seen 2 days ago, and never got prescribed medication. No other symptoms occurring with this. Denies sinus pain or congestion, cough or sore throat. No fever no chills. Prior to last visit, she tried some peroxide drops in the ear without relief. No facial numbness or swelling. The accompanying pain is dull. Helps to lie on one side because she has decreased hearing out of that ear if she lays on that side. Not constant and nothing makes it better or worse.         Past Medical History:   Diagnosis Date    Allergic rhinitis     Asthma        Social History     Socioeconomic History    Marital status: Legally      Spouse name: Not on file    Number of children: Not on file    Years of education: Not on file    Highest education level: Not on file   Social Needs    Financial resource strain: Not on file    Food insecurity - worry: Not on file    Food insecurity - inability: Not on file    Transportation needs - medical: Not on file    Transportation needs - non-medical: Not on file   Occupational History    Not on file   Tobacco Use    Smoking status: Never Smoker    Smokeless tobacco: Never Used   Substance and Sexual Activity    Alcohol use: No    Drug use: No    Sexual activity: Yes     Partners: Male   Other Topics Concern    Are you pregnant or think you may be? No    Breast-feeding No   Social History Narrative    Not on file       Past Surgical History:   Procedure Laterality Date     SECTION      TUBAL LIGATION         Review of Systems   HENT: Positive for ear pain.        Objective:   BP (!) 126/90 (BP Location: Right arm, Patient Position: Sitting, BP Method: Large (Manual))   Pulse 82   Temp 97.8 °F (36.6 °C) (Oral)   Ht 4' 11" " (1.499 m)   Wt 74.7 kg (164 lb 12.7 oz)   SpO2 98%   BMI 33.28 kg/m²      Physical Exam   HENT:   Right Ear: Hearing, tympanic membrane, external ear and ear canal normal.   Left Ear: Hearing, tympanic membrane, external ear and ear canal normal.   Neck: Normal carotid pulses and no JVD present. Carotid bruit is not present.       Assessment:       1. Otalgia, right        Plan:       Rika was seen today for facial swelling.    Diagnoses and all orders for this visit:    Otalgia, right  Encouraged to try medication for a week. Advised if no improvement a CT angio may be considered to rule out aneurysm. She verbalized understanding.      Follow-up if symptoms worsen or fail to improve.

## 2018-12-11 ENCOUNTER — PATIENT MESSAGE (OUTPATIENT)
Dept: FAMILY MEDICINE | Facility: CLINIC | Age: 53
End: 2018-12-11

## 2018-12-11 DIAGNOSIS — H93.13 EAR NOISE/BUZZING, BILATERAL: Primary | ICD-10-CM

## 2018-12-19 ENCOUNTER — HOSPITAL ENCOUNTER (OUTPATIENT)
Dept: RADIOLOGY | Facility: HOSPITAL | Age: 53
Discharge: HOME | End: 2018-12-19
Attending: NURSE PRACTITIONER
Payer: COMMERCIAL

## 2018-12-19 DIAGNOSIS — H93.13 EAR NOISE/BUZZING, BILATERAL: ICD-10-CM

## 2018-12-19 PROCEDURE — 25500020 PHARM REV CODE 255: Performed by: NURSE PRACTITIONER

## 2018-12-19 PROCEDURE — 70498 CT ANGIOGRAPHY NECK: CPT | Mod: 26,,, | Performed by: RADIOLOGY

## 2018-12-19 PROCEDURE — 70498 CT ANGIOGRAPHY NECK: CPT | Mod: TC

## 2018-12-19 RX ADMIN — IOHEXOL 75 ML: 350 INJECTION, SOLUTION INTRAVENOUS at 09:12

## 2018-12-20 ENCOUNTER — TELEPHONE (OUTPATIENT)
Dept: FAMILY MEDICINE | Facility: CLINIC | Age: 53
End: 2018-12-20

## 2018-12-20 NOTE — TELEPHONE ENCOUNTER
----- Message from DASH Razo-LEANDRA sent at 12/19/2018  2:51 PM CST -----  Please inform patient the CTA Neck did not show any jugulare tumor, carotid tumor or vertebral or jugular venous abnormality. it does show multinodular goiter of the thyroid and interstitial lung disease (from asthma). Please be sure to keep the appt for ENT.

## 2019-01-03 ENCOUNTER — OFFICE VISIT (OUTPATIENT)
Dept: FAMILY MEDICINE | Facility: CLINIC | Age: 54
End: 2019-01-03
Payer: COMMERCIAL

## 2019-01-03 VITALS
HEART RATE: 94 BPM | DIASTOLIC BLOOD PRESSURE: 80 MMHG | TEMPERATURE: 98 F | HEIGHT: 59 IN | OXYGEN SATURATION: 96 % | BODY MASS INDEX: 32.53 KG/M2 | WEIGHT: 161.38 LBS | SYSTOLIC BLOOD PRESSURE: 116 MMHG

## 2019-01-03 DIAGNOSIS — N89.8 VAGINAL ITCHING: Primary | ICD-10-CM

## 2019-01-03 LAB
CANDIDA RRNA VAG QL PROBE: NEGATIVE
G VAGINALIS RRNA GENITAL QL PROBE: NEGATIVE
T VAGINALIS RRNA GENITAL QL PROBE: NEGATIVE

## 2019-01-03 PROCEDURE — 3074F PR MOST RECENT SYSTOLIC BLOOD PRESSURE < 130 MM HG: ICD-10-PCS | Mod: CPTII,S$GLB,, | Performed by: NURSE PRACTITIONER

## 2019-01-03 PROCEDURE — 99212 OFFICE O/P EST SF 10 MIN: CPT | Mod: S$GLB,,, | Performed by: NURSE PRACTITIONER

## 2019-01-03 PROCEDURE — 3079F DIAST BP 80-89 MM HG: CPT | Mod: CPTII,S$GLB,, | Performed by: NURSE PRACTITIONER

## 2019-01-03 PROCEDURE — 3008F BODY MASS INDEX DOCD: CPT | Mod: CPTII,S$GLB,, | Performed by: NURSE PRACTITIONER

## 2019-01-03 PROCEDURE — 3079F PR MOST RECENT DIASTOLIC BLOOD PRESSURE 80-89 MM HG: ICD-10-PCS | Mod: CPTII,S$GLB,, | Performed by: NURSE PRACTITIONER

## 2019-01-03 PROCEDURE — 87510 GARDNER VAG DNA DIR PROBE: CPT

## 2019-01-03 PROCEDURE — 87480 CANDIDA DNA DIR PROBE: CPT

## 2019-01-03 PROCEDURE — 99999 PR PBB SHADOW E&M-EST. PATIENT-LVL IV: ICD-10-PCS | Mod: PBBFAC,,, | Performed by: NURSE PRACTITIONER

## 2019-01-03 PROCEDURE — 99999 PR PBB SHADOW E&M-EST. PATIENT-LVL IV: CPT | Mod: PBBFAC,,, | Performed by: NURSE PRACTITIONER

## 2019-01-03 PROCEDURE — 99212 PR OFFICE/OUTPT VISIT, EST, LEVL II, 10-19 MIN: ICD-10-PCS | Mod: S$GLB,,, | Performed by: NURSE PRACTITIONER

## 2019-01-03 PROCEDURE — 3008F PR BODY MASS INDEX (BMI) DOCUMENTED: ICD-10-PCS | Mod: CPTII,S$GLB,, | Performed by: NURSE PRACTITIONER

## 2019-01-03 PROCEDURE — 3074F SYST BP LT 130 MM HG: CPT | Mod: CPTII,S$GLB,, | Performed by: NURSE PRACTITIONER

## 2019-01-03 RX ORDER — NYSTATIN 100000 U/G
CREAM TOPICAL 2 TIMES DAILY
Qty: 30 G | Refills: 1 | Status: SHIPPED | OUTPATIENT
Start: 2019-01-03 | End: 2019-03-25

## 2019-01-03 RX ORDER — FLUCONAZOLE 150 MG/1
150 TABLET ORAL ONCE
Qty: 1 TABLET | Refills: 0 | Status: SHIPPED | OUTPATIENT
Start: 2019-01-03 | End: 2019-01-03

## 2019-01-03 NOTE — PROGRESS NOTES
"Subjective:       Patient ID: Rika June is a 53 y.o. female.    Chief Complaint: Vaginitis    Vaginal Discharge   The patient's primary symptoms include genital itching. The patient's pertinent negatives include no vaginal discharge. This is a new problem. The current episode started in the past 7 days. The problem has been unchanged (itching). She has tried antifungals for the symptoms. The treatment provided mild relief. She is sexually active. She uses nothing for contraception.       Past Medical History:   Diagnosis Date    Allergic rhinitis     Asthma        Social History     Socioeconomic History    Marital status: Legally      Spouse name: Not on file    Number of children: Not on file    Years of education: Not on file    Highest education level: Not on file   Social Needs    Financial resource strain: Not on file    Food insecurity - worry: Not on file    Food insecurity - inability: Not on file    Transportation needs - medical: Not on file    Transportation needs - non-medical: Not on file   Occupational History    Not on file   Tobacco Use    Smoking status: Never Smoker    Smokeless tobacco: Never Used   Substance and Sexual Activity    Alcohol use: No    Drug use: No    Sexual activity: Yes     Partners: Male   Other Topics Concern    Are you pregnant or think you may be? No    Breast-feeding No   Social History Narrative    Not on file       Past Surgical History:   Procedure Laterality Date     SECTION      TUBAL LIGATION         Review of Systems   Genitourinary: Positive for vaginal pain. Negative for vaginal bleeding and vaginal discharge.       Objective:   /80 (BP Location: Left arm, Patient Position: Sitting, BP Method: Large (Manual))   Pulse 94   Temp 98.1 °F (36.7 °C) (Oral)   Ht 4' 11" (1.499 m)   Wt 73.2 kg (161 lb 6 oz)   SpO2 96%   BMI 32.59 kg/m²      Physical Exam   Genitourinary: There is no rash, tenderness or lesion on the " right labia. There is no rash, tenderness or lesion on the left labia. There is erythema in the vagina. No bleeding in the vagina. Vaginal discharge found.   Genitourinary Comments: + labial erythema        Assessment:       1. Vaginal itching        Plan:       Rika was seen today for vaginitis.    Diagnoses and all orders for this visit:    Vaginal itching  -     Vaginosis Screen by DNA Probe  -     fluconazole (DIFLUCAN) 150 MG Tab; Take 1 tablet (150 mg total) by mouth once. for 1 dose  -     nystatin (MYCOSTATIN) cream; Apply topically 2 (two) times daily.    Will follow up when vaginosis screen results available.  Follow-up if symptoms worsen or fail to improve.

## 2019-01-03 NOTE — PATIENT INSTRUCTIONS
Preventing Vaginitis     Use mild, unscented soap when you bathe or shower to avoid irritating your vagina.    Vaginitis is irritation or infection of the vagina or vulva (the outside opening of the vagina). Vaginitis can be caused by bacteria, viruses, parasites, or yeast. Chemicals (such as in perfumes or soaps or in spermicides) can sometimes be a cause. Vaginitis can be caused by hormone changes in pregnancy or with menopause. You can help prevent vaginitis. Follow the tips below. And see your healthcare provider if you have any symptoms.  Hygiene  · Avoid chemicals. Do not use vaginal sprays. Do not use scented toilet paper or tampons that are scented. Sprays and scents have chemicals that can irritate your vagina.  · Do not douche unless you are told to by your healthcare provider. Douching is rarely needed. And it upsets the normal balance in the vagina.  · Wash yourself well. Wash the outer vaginal area (vulva) every day with mild, unscented soap. Keep it as dry as possible.  · Wipe correctly. Make sure to wipe from front to back after a bowel movement. This helps keep from spreading bacteria from your anus to your vagina.  · Change your tampon often. During your period, make sure to change your tampon as often as directed on the package. This allows the normal flow of vaginal discharge and blood.  Lifestyle  · Limit your number of sexual partners. The more partners you have, the greater your risk of infection. Using condoms helps reduce your risk.  · Get enough sleep. Sleep helps keep your bodys immune system healthy. This helps you fight infection.  · Lose weight, if needed. Excess weight can reduce air circulation around your vagina. This can increase your risk of infection.  · Exercise regularly. Regular activity helps keep your body healthy.  · Take antibiotics only as directed. Antibiotics can change the normal chemical balance in the vagina.    Clothing  · Dont sit in wet clothes. Yeast thrives  when its warm and damp.  · Dont wear tight pants. And dont wear tights, leggings, or hose without a cotton crotch. These types of clothing trap warmth and moisture.  · Wear cotton underwear. Cotton lets air circulate around the vagina.  Symptoms of vaginitis  · Irritation, swelling, or itching of the genital area  · Vaginal discharge  · Bad vaginal odor  · Pain or burning during urination   Date Last Reviewed: 12/1/2016 © 2000-2017 [employer]. [address] [place] [id number]. All rights reserved. This information is not intended as a substitute for professional medical care. Always follow your healthcare professional's instructions.        Preventing Vaginal Infection  These steps can help you stay comfortable during treatment of a vaginal infection. They also help prevent vaginal infections in the future.  Keeping a healthy balance  Factors that change the normal balance in the vagina can lead to a vaginal infection. To help keep the balance normal, try these tips:  · Change out of wet bathing suits and damp exercise clothes as soon as possible. Yeast thrive in a warm, moist environment.  · Avoid wearing tight pants. Choose cotton underwear and pantyhose that have a cotton crotch. Cotton keeps you cooler and drier than synthetics.  · Don't douche unless directed by your health care provider. Douching can destroy friendly bacteria and change the vagina's normal balance.  · Wipe from front to back after using the toilet. This prevents bacteria from spreading from the anus to the vulva.  · Wash the vulva with mild, unscented soap or with plain water.  · Wash your diaphragm, spermicide applicators, and sex toys with mild soap and water after use. Dry them thoroughly before putting them away.  · Change tampons often (every 2 hours to 4 hours). Leaving a tampon in for too long may disrupt the balance of vaginal bacteria.  · Avoid vaginal sprays, scented toilet paper and soaps, and deodorant  tampons or pads, which can cause vaginal irritation  Staying healthy overall  Good overall health can help you resist infection. To be healthier:  · Help protect yourself from STDs by using latex condoms for intercourse. Ask your health care provider for more information about safer sex.  · Eat a variety of healthy foods.  · Exercise regularly.  · Get enough rest and sleep.  · Maintain a healthy weight. If you need to lose weight, ask your health care provider for advice on how to start.  Date Last Reviewed: 5/18/2015 © 2000-2017 [employer]. [address] [place] [address]. All rights reserved. This information is not intended as a substitute for professional medical care. Always follow your healthcare professional's instructions.

## 2019-01-28 ENCOUNTER — OFFICE VISIT (OUTPATIENT)
Dept: OTOLARYNGOLOGY | Facility: CLINIC | Age: 54
End: 2019-01-28
Payer: COMMERCIAL

## 2019-01-28 ENCOUNTER — CLINICAL SUPPORT (OUTPATIENT)
Dept: AUDIOLOGY | Facility: CLINIC | Age: 54
End: 2019-01-28
Payer: COMMERCIAL

## 2019-01-28 VITALS — WEIGHT: 158.75 LBS | BODY MASS INDEX: 32 KG/M2 | HEIGHT: 59 IN

## 2019-01-28 DIAGNOSIS — H93.A1 PULSATILE TINNITUS OF RIGHT EAR: Primary | ICD-10-CM

## 2019-01-28 DIAGNOSIS — H93.19 TINNITUS, UNSPECIFIED LATERALITY: Primary | ICD-10-CM

## 2019-01-28 DIAGNOSIS — G44.89 CHRONIC MIXED HEADACHE SYNDROME: ICD-10-CM

## 2019-01-28 DIAGNOSIS — H93.A9 PULSATILE TINNITUS: ICD-10-CM

## 2019-01-28 PROCEDURE — 92567 TYMPANOMETRY: CPT | Mod: S$GLB,,, | Performed by: AUDIOLOGIST

## 2019-01-28 PROCEDURE — 3008F PR BODY MASS INDEX (BMI) DOCUMENTED: ICD-10-PCS | Mod: CPTII,S$GLB,, | Performed by: OTOLARYNGOLOGY

## 2019-01-28 PROCEDURE — 99999 PR PBB SHADOW E&M-EST. PATIENT-LVL III: ICD-10-PCS | Mod: PBBFAC,,, | Performed by: OTOLARYNGOLOGY

## 2019-01-28 PROCEDURE — 99205 PR OFFICE/OUTPT VISIT, NEW, LEVL V, 60-74 MIN: ICD-10-PCS | Mod: S$GLB,,, | Performed by: OTOLARYNGOLOGY

## 2019-01-28 PROCEDURE — 92567 PR TYMPA2METRY: ICD-10-PCS | Mod: S$GLB,,, | Performed by: AUDIOLOGIST

## 2019-01-28 PROCEDURE — 3008F BODY MASS INDEX DOCD: CPT | Mod: CPTII,S$GLB,, | Performed by: OTOLARYNGOLOGY

## 2019-01-28 PROCEDURE — 92557 PR COMPREHENSIVE HEARING TEST: ICD-10-PCS | Mod: S$GLB,,, | Performed by: AUDIOLOGIST

## 2019-01-28 PROCEDURE — 99205 OFFICE O/P NEW HI 60 MIN: CPT | Mod: S$GLB,,, | Performed by: OTOLARYNGOLOGY

## 2019-01-28 PROCEDURE — 99999 PR PBB SHADOW E&M-EST. PATIENT-LVL III: CPT | Mod: PBBFAC,,, | Performed by: OTOLARYNGOLOGY

## 2019-01-28 PROCEDURE — 92557 COMPREHENSIVE HEARING TEST: CPT | Mod: S$GLB,,, | Performed by: AUDIOLOGIST

## 2019-01-28 RX ORDER — ACETAZOLAMIDE 250 MG/1
250 TABLET ORAL 2 TIMES DAILY
Qty: 60 TABLET | Refills: 5 | Status: SHIPPED | OUTPATIENT
Start: 2019-01-28 | End: 2019-03-25

## 2019-01-28 NOTE — PROGRESS NOTES
Ms. June was seen today for a hearing evaluation. Ms. June reported hearing her heartbeat in her right ear only.     Pure tone audiometry revealed a mild SNHL beginning at 4k Hz., bilaterally   SRT and PTA are in good agreement bilaterally.    SRT was obtained at 15dB for the right ear with 100% speech discrimination and 15dB for the left ear with 100% speech discrimination.   Tympanometry revealed Type A, AU.     Recommendations:  1. Otologic Evaluation  2. Annual Audiogram  3. Hearing Protection

## 2019-01-28 NOTE — PROGRESS NOTES
Otology/Neurotology Consultation Report       Chief Complaint: tinnitus    History of Present Illness: 53 y.o.  female presents for evaluation of tinnitus. Notes it is right sided only and pulsatile, corresponding with heart beat. States it is more noticeable in quiet areas, she tries to block it out. Denies any vision changes. Notes some generalized headache occasionally, unsure if related to tinnitus. Notes some improvement after taking flonase. No other medications, denies any other medical issues. Denies hearing loss/dizziness/vertigo, no history of ear trauma/surgery, no history of stroke.     ROS     General:  Denies fever, chills  HENT:  Tinnitus. Denies rhinorrhea, sore throat  Eyes: Denies vision changes, red eyes  CVS:  Denies chest pain, pressure   Resp:  Denies shortness of breath, cough, sputum  GI:  Denies diarrhea, constipation, abdominal pain, nausea, vomiting  :  Denies dysuria, hematuria   MSK:  Denies myalgias, arthralgias  Skin:  Denies  bleeding, denies rashes  Neuro:  Denies headache, dizziness  All other systems otherwise negative        Past Medical History: Patient has a past medical history of Allergic rhinitis and Asthma.    Past Surgical History: Patient has a past surgical history that includes  section and Tubal ligation.    Social History: Patient reports that  has never smoked. she has never used smokeless tobacco. She reports that she does not drink alcohol or use drugs.    Family History: family history includes Breast cancer in her mother; Cancer in her father; Diabetes in her maternal grandmother.    Medications:   Current Outpatient Medications on File Prior to Visit   Medication Sig Dispense Refill    albuterol (PROVENTIL) 2.5 mg /3 mL (0.083 %) nebulizer solution Take 3 mLs (2.5 mg total) by nebulization every 6 (six) hours as needed. 1 Box 0    clindamycin-benzoyl peroxide gel Apply topically once daily. To acne 45 g 1    fluticasone (FLONASE) 50 mcg/actuation  nasal spray 2 sprays by Each Nare route once daily. 16 g 0    ibuprofen (ADVIL,MOTRIN) 800 MG tablet Take 1 tablet (800 mg total) by mouth every 8 (eight) hours as needed for Pain. 40 tablet 0    methocarbamol (ROBAXIN) 750 MG Tab   0    nystatin (MYCOSTATIN) cream Apply topically 2 (two) times daily. 30 g 1    levocetirizine (XYZAL) 5 MG tablet Take 1 tablet (5 mg total) by mouth every evening. 30 tablet 0    triamcinolone acetonide 0.1% (KENALOG) 0.1 % cream Apply topically 2 (two) times daily. PRN itchy rash for 10 days 80 g 1    valacyclovir (VALTREX) 1000 MG tablet Take 1 tablet (1,000 mg total) by mouth 2 (two) times daily. 20 tablet 0     No current facility-administered medications on file prior to visit.          Allergies: Patient is allergic to hydrocodone-acetaminophen.    Physical Exam    Constitutional: Well appearing / communicating.  NAD.  Eyes: EOM I Bilaterally  Head/Face: Normocephalic.  Negative paranasal sinus pressure/tenderness.  Salivary glands WNL.  House Brackmann I Bilaterally.  Right Ear: Cerumen presentExternal Auditory Canal WNL,TM w/o masses/lesions/perforations.  Auricle WNL.  Left Ear: Cerumen present. External Auditory Canal WNL,TM w/o masses/lesions/perforations. Auricle WNL.  Nose: No gross nasal septal deviation. Inferior Turbinates 2+ bilaterally. No septal perforation. No masses/lesions. External nasal skin without masses/lesions.  Oral Cavity: Gingiva/lips WNL.  FOM Soft, no masses palpated. Oral Tongue mobile. Hard Palate WNL.   Oropharynx: BOT WNL. No masses/lesions noted. Tonsillar fossa/pharyngeal wall without lesions. Posterior oropharynx WNL.  Soft palate without masses. Midline uvula.   Neck/Lymphatic: right IJ compression relieves tinnitus. No LAD I-VI bilaterally. Large thyroid goiter.  Neuro/Psychiatric: AOx3.  Normal mood and affect.   Cardiovascular: Normal carotid pulses bilaterally, no increasing jugular venous distention noted at cervical region  bilaterally.    Respiratory: Normal respiratory effort, no stridor, no retractions noted.              Ear Microscopy  - nonobstructive cerumen removed.    The tympanic membranes were evaluated and was unremarkable. The patient tolerated the procedure well. There were no complications.    No audible bruits over ear/ neck/ mastoid.    PT relieved with neck compression on right               Rika was seen today for other.    Diagnoses and all orders for this visit:    Pulsatile tinnitus of right ear    VENOUS HUM: RON Meléndez was seen today for other.    Diagnoses and all orders for this visit:    Pulsatile tinnitus of right ear    Pulsatile tinnitus  -     CT Temporal Bone without contrast; Future    Chronic mixed headache syndrome  -     CT Head Without Contrast; Future    Other orders  -     acetaZOLAMIDE (DIAMOX) 250 MG tablet; Take 1 tablet (250 mg total) by mouth 2 (two) times daily. for 10 days      RTC 3 wks.  -CTA head  -RTC with results

## 2019-01-28 NOTE — LETTER
January 28, 2019      Stephen Weller, FNP-C  [address]  [place] [address]           [facility] - Otorhinolaryngology  [address]  [place] [address]  Phone: [phone]  Fax: [phone]          Patient: Rika June   MR Number: [id number]   YOB: 1965   Date of Visit: 1/28/2019       Dear Stephen Weller:    Thank you for referring Rika June to me for evaluation. Attached you will find relevant portions of my assessment and plan of care.    If you have questions, please do not hesitate to call me. I look forward to following Rika June along with you.    Sincerely,    Niles Pal MD    Enclosure  CC:  No Recipients    If you would like to receive this communication electronically, please contact [email or web address] or [phone] to request more information on [employer] Link access.    For providers and/or their staff who would like to refer a patient to [facility], please contact us through our one-stop-shop provider referral line, [facility], at [phone].    If you feel you have received this communication in error or would no longer like to receive these types of communications, please e-mail [email or web address]

## 2019-03-14 ENCOUNTER — TELEPHONE (OUTPATIENT)
Dept: FAMILY MEDICINE | Facility: CLINIC | Age: 54
End: 2019-03-14

## 2019-03-14 NOTE — TELEPHONE ENCOUNTER
Our records indicate she is due for a pap smear. Would she like to schedule a visit for this or has she had this done by her GYN? If so can we get an SOBEIDA so we can get records    Elizabeth Santo MD

## 2019-03-25 ENCOUNTER — OFFICE VISIT (OUTPATIENT)
Dept: FAMILY MEDICINE | Facility: CLINIC | Age: 54
End: 2019-03-25
Payer: COMMERCIAL

## 2019-03-25 ENCOUNTER — TELEPHONE (OUTPATIENT)
Dept: DERMATOLOGY | Facility: CLINIC | Age: 54
End: 2019-03-25

## 2019-03-25 VITALS
OXYGEN SATURATION: 98 % | TEMPERATURE: 99 F | HEART RATE: 82 BPM | SYSTOLIC BLOOD PRESSURE: 130 MMHG | BODY MASS INDEX: 32.24 KG/M2 | HEIGHT: 59 IN | DIASTOLIC BLOOD PRESSURE: 82 MMHG | WEIGHT: 159.94 LBS

## 2019-03-25 DIAGNOSIS — J30.1 SEASONAL ALLERGIC RHINITIS DUE TO POLLEN: ICD-10-CM

## 2019-03-25 DIAGNOSIS — L25.9 CONTACT DERMATITIS, UNSPECIFIED CONTACT DERMATITIS TYPE, UNSPECIFIED TRIGGER: Primary | ICD-10-CM

## 2019-03-25 PROCEDURE — 3079F PR MOST RECENT DIASTOLIC BLOOD PRESSURE 80-89 MM HG: ICD-10-PCS | Mod: CPTII,S$GLB,, | Performed by: NURSE PRACTITIONER

## 2019-03-25 PROCEDURE — 3008F BODY MASS INDEX DOCD: CPT | Mod: CPTII,S$GLB,, | Performed by: NURSE PRACTITIONER

## 2019-03-25 PROCEDURE — 99999 PR PBB SHADOW E&M-EST. PATIENT-LVL III: CPT | Mod: PBBFAC,,, | Performed by: NURSE PRACTITIONER

## 2019-03-25 PROCEDURE — 99214 PR OFFICE/OUTPT VISIT, EST, LEVL IV, 30-39 MIN: ICD-10-PCS | Mod: S$GLB,,, | Performed by: NURSE PRACTITIONER

## 2019-03-25 PROCEDURE — 3075F PR MOST RECENT SYSTOLIC BLOOD PRESS GE 130-139MM HG: ICD-10-PCS | Mod: CPTII,S$GLB,, | Performed by: NURSE PRACTITIONER

## 2019-03-25 PROCEDURE — 3075F SYST BP GE 130 - 139MM HG: CPT | Mod: CPTII,S$GLB,, | Performed by: NURSE PRACTITIONER

## 2019-03-25 PROCEDURE — 3079F DIAST BP 80-89 MM HG: CPT | Mod: CPTII,S$GLB,, | Performed by: NURSE PRACTITIONER

## 2019-03-25 PROCEDURE — 99214 OFFICE O/P EST MOD 30 MIN: CPT | Mod: S$GLB,,, | Performed by: NURSE PRACTITIONER

## 2019-03-25 PROCEDURE — 3008F PR BODY MASS INDEX (BMI) DOCUMENTED: ICD-10-PCS | Mod: CPTII,S$GLB,, | Performed by: NURSE PRACTITIONER

## 2019-03-25 PROCEDURE — 99999 PR PBB SHADOW E&M-EST. PATIENT-LVL III: ICD-10-PCS | Mod: PBBFAC,,, | Performed by: NURSE PRACTITIONER

## 2019-03-25 RX ORDER — LEVOCETIRIZINE DIHYDROCHLORIDE 5 MG/1
5 TABLET, FILM COATED ORAL NIGHTLY
Qty: 90 TABLET | Refills: 2 | Status: SHIPPED | OUTPATIENT
Start: 2019-03-25 | End: 2019-06-03

## 2019-03-25 RX ORDER — TRIAMCINOLONE ACETONIDE 1 MG/G
CREAM TOPICAL 2 TIMES DAILY
Qty: 80 G | Refills: 0 | Status: SHIPPED | OUTPATIENT
Start: 2019-03-25 | End: 2019-04-04

## 2019-03-25 NOTE — PROGRESS NOTES
Subjective:        Chief Complaint  Chief Complaint   Patient presents with    rash on her chest     x 1 week ago       HPI  Rika June is a 53 y.o. female with multiple medical diagnoses as listed in the medical history and problem list that presents for rash on left breast, for approximately 5 days.  Patient is new to me. She has tried applying antibiotic ointment, and kenalog cream with minimal effect. It is very itchy. She denies any insect bite or contact with any plants. She denies fever, chills. Denies the rash elsewhere on her body. She is otherwise feeling healthy and well today.       PAST MEDICAL HISTORY:  Past Medical History:   Diagnosis Date    Allergic rhinitis     Asthma        PAST SURGICAL HISTORY:  Past Surgical History:   Procedure Laterality Date     SECTION      TUBAL LIGATION         SOCIAL HISTORY:  Social History     Socioeconomic History    Marital status: Legally      Spouse name: Not on file    Number of children: Not on file    Years of education: Not on file    Highest education level: Not on file   Occupational History    Not on file   Social Needs    Financial resource strain: Not on file    Food insecurity:     Worry: Not on file     Inability: Not on file    Transportation needs:     Medical: Not on file     Non-medical: Not on file   Tobacco Use    Smoking status: Never Smoker    Smokeless tobacco: Never Used   Substance and Sexual Activity    Alcohol use: No    Drug use: No    Sexual activity: Yes     Partners: Male   Lifestyle    Physical activity:     Days per week: Not on file     Minutes per session: Not on file    Stress: Not on file   Relationships    Social connections:     Talks on phone: Not on file     Gets together: Not on file     Attends Buddhist service: Not on file     Active member of club or organization: Not on file     Attends meetings of clubs or organizations: Not on file     Relationship status: Not on file     Intimate partner violence:     Fear of current or ex partner: Not on file     Emotionally abused: Not on file     Physically abused: Not on file     Forced sexual activity: Not on file   Other Topics Concern    Are you pregnant or think you may be? No    Breast-feeding No   Social History Narrative    Not on file       FAMILY HISTORY:  Family History   Problem Relation Age of Onset    Breast cancer Mother     Cancer Father         throat    Diabetes Maternal Grandmother     Ovarian cancer Neg Hx     Stroke Neg Hx     Hypertension Neg Hx        ALLERGIES AND MEDICATIONS: updated and reviewed.  Review of patient's allergies indicates:   Allergen Reactions    Hydrocodone-acetaminophen Nausea Only and Nausea And Vomiting     Other reaction(s): Nausea     Current Outpatient Medications   Medication Sig Dispense Refill    albuterol (PROVENTIL) 2.5 mg /3 mL (0.083 %) nebulizer solution Take 3 mLs (2.5 mg total) by nebulization every 6 (six) hours as needed. 1 Box 0    clindamycin-benzoyl peroxide gel Apply topically once daily. To acne 45 g 1    levocetirizine (XYZAL) 5 MG tablet Take 1 tablet (5 mg total) by mouth every evening. 90 tablet 2    triamcinolone acetonide 0.1% (KENALOG) 0.1 % cream Apply topically 2 (two) times daily. PRN itchy rash for 10 days 80 g 0     No current facility-administered medications for this visit.        ROS  Review of Systems   Constitutional: Negative for activity change, appetite change and fever.   Respiratory: Negative for cough, shortness of breath and wheezing.    Cardiovascular: Negative for chest pain.   Gastrointestinal: Negative for constipation, diarrhea, nausea and vomiting.   Skin: Positive for rash (left breast).   Neurological: Negative for headaches.   Psychiatric/Behavioral: Negative for behavioral problems and confusion.       Objective:     Physical Exam  Vitals:    03/25/19 1555   BP: 130/82   BP Location: Right arm   Patient Position: Sitting   BP Method:  "Medium (Manual)   Pulse: 82   Temp: 98.5 °F (36.9 °C)   TempSrc: Oral   SpO2: 98%   Weight: 72.6 kg (159 lb 15.1 oz)   Height: 4' 11" (1.499 m)    Body mass index is 32.3 kg/m².  Weight: 72.6 kg (159 lb 15.1 oz)   Height: 4' 11" (149.9 cm)   Physical Exam   Constitutional: She is oriented to person, place, and time. Vital signs are normal. She appears well-developed and well-nourished.   HENT:   Head: Normocephalic and atraumatic.   Eyes: EOM are normal.   Cardiovascular: Normal rate and regular rhythm.   Pulmonary/Chest: Effort normal and breath sounds normal.   Musculoskeletal: Normal range of motion.   Neurological: She is alert and oriented to person, place, and time.   Skin: Skin is warm and dry. Rash (left breast. see photo.) noted.   Psychiatric: She has a normal mood and affect. Her behavior is normal.         Assessment:     1. Contact dermatitis, unspecified contact dermatitis type, unspecified trigger    2. Seasonal allergic rhinitis due to pollen      Plan:     Rika was seen today for rash on her chest.    Diagnoses and all orders for this visit:    Contact dermatitis, unspecified contact dermatitis type, unspecified trigger  -     triamcinolone acetonide 0.1% (KENALOG) 0.1 % cream; Apply topically 2 (two) times daily. PRN itchy rash for 10 days  -     Keep skin clean and dry  -     Follow up via MyChart regarding changes in the rash    Seasonal allergic rhinitis due to pollen  -     levocetirizine (XYZAL) 5 MG tablet; Take 1 tablet (5 mg total) by mouth every evening.  -     Reduce exposure to allergens as much as possible      Health Maintenance       Date Due Completion Date    Mammogram 08/15/2003 ---    Colonoscopy 08/15/2013 ---    Pap Smear with HPV Cotest 06/21/2016 6/21/2013    Influenza Vaccine 08/01/2018 10/11/2017    Override on 10/11/2017: Done    Override on 12/31/2015: Declined    TETANUS VACCINE 11/29/2019 (Originally 8/15/1981) ---    Lipid Panel 10/11/2022 10/11/2017    Override on " 10/11/2017: Done          Health Maintenance reviewed, addressed as per orders    Follow-up: Follow up if symptoms worsen or fail to improve.    The patient expressed understanding and no barriers to adherence were identified.     1. The patient indicates understanding of these issues and agrees with the plan. Brief care plan is updated and reviewed with the patient as applicable.     2. The patient is given an After Visit Summary that lists all medications with directions, allergies, orders placed during this encounter and follow-up instructions.     3. I have reviewed the patient's medical information including past medical, family, and social history sections including the medications and allergies.     4. We discussed the patient's current medications. I reconciled the patient's medication list and prepared and supplied needed refills.       Chana Shoemaker, DNP, APRN, FNP-c  Family Medicine    My collaborating physicians are Dr. Javy Russell and Dr. Angel Thompson

## 2019-03-25 NOTE — TELEPHONE ENCOUNTER
Returned pt call, at pcp appointment. Left message with daughter.    ----- Message from Rae Mcdonnell sent at 3/25/2019  4:41 PM CDT -----  Contact: patient   [phone]-please call above patient need to get in has a rash on breast waiting on a call from the nurse thanks

## 2019-03-26 ENCOUNTER — TELEPHONE (OUTPATIENT)
Dept: DERMATOLOGY | Facility: CLINIC | Age: 54
End: 2019-03-26

## 2019-03-26 NOTE — TELEPHONE ENCOUNTER
Returned pt call. No answer. I left a message for the patient to return my call.    ----- Message from Barby Perez sent at 3/25/2019  5:13 PM CDT -----  Contact: PT  PT called back to missed call from you.    Callback: [phone]

## 2019-03-27 ENCOUNTER — TELEPHONE (OUTPATIENT)
Dept: DERMATOLOGY | Facility: CLINIC | Age: 54
End: 2019-03-27

## 2019-03-27 NOTE — TELEPHONE ENCOUNTER
Returned pt call. I left a message for the patient to return my call.    ----- Message from Jennifer Rizo sent at 3/26/2019  4:52 PM CDT -----  Contact: pt       ----- Message -----  From: Denisse Ardon  Sent: 3/26/2019   3:04 PM  To: Matthew Hernandez    Cool - pt Patient Returning Call from [facility]    Who Left Message for Patient: pt is returning the nurses phone call   Communication Preference: can you please call pt at [phone]  Additional Information: none    RANDY

## 2019-04-05 ENCOUNTER — PATIENT OUTREACH (OUTPATIENT)
Dept: ADMINISTRATIVE | Facility: HOSPITAL | Age: 54
End: 2019-04-05

## 2019-04-12 ENCOUNTER — OFFICE VISIT (OUTPATIENT)
Dept: DERMATOLOGY | Facility: CLINIC | Age: 54
End: 2019-04-12
Payer: COMMERCIAL

## 2019-04-12 DIAGNOSIS — L25.5 RHUS DERMATITIS: Primary | ICD-10-CM

## 2019-04-12 PROCEDURE — 99213 OFFICE O/P EST LOW 20 MIN: CPT | Mod: S$GLB,,, | Performed by: PHYSICIAN ASSISTANT

## 2019-04-12 PROCEDURE — 99999 PR PBB SHADOW E&M-EST. PATIENT-LVL II: ICD-10-PCS | Mod: PBBFAC,,, | Performed by: PHYSICIAN ASSISTANT

## 2019-04-12 PROCEDURE — 99999 PR PBB SHADOW E&M-EST. PATIENT-LVL II: CPT | Mod: PBBFAC,,, | Performed by: PHYSICIAN ASSISTANT

## 2019-04-12 PROCEDURE — 99213 PR OFFICE/OUTPT VISIT, EST, LEVL III, 20-29 MIN: ICD-10-PCS | Mod: S$GLB,,, | Performed by: PHYSICIAN ASSISTANT

## 2019-04-12 RX ORDER — TRIAMCINOLONE ACETONIDE 1 MG/G
CREAM TOPICAL
Qty: 454 G | Refills: 0 | Status: SHIPPED | OUTPATIENT
Start: 2019-04-12 | End: 2019-06-03

## 2019-04-12 RX ORDER — PREDNISONE 20 MG/1
TABLET ORAL
Qty: 42 TABLET | Refills: 0 | Status: SHIPPED | OUTPATIENT
Start: 2019-04-12 | End: 2019-06-03

## 2019-04-12 NOTE — PATIENT INSTRUCTIONS
Discussed benefits and risks of treatment with prednisone including but not limited to increased appetite, weight gain, irritability, insomnia, fluid retention, GI upset, increased blood pressure, and increased blood sugars. If Prednisone is needed long term (>4 weeks), additional side effects such as kidney stones, gastric ulceration, avascular necrosis of femoral head may be encountered.

## 2019-04-12 NOTE — PROGRESS NOTES
Subjective:       Patient ID:  Rika June is a 53 y.o. female who presents for   Chief Complaint   Patient presents with    Rash     Chest, neck,left arm,abdomen     Rash  - Initial  Affected locations: right neck, right chest, left arm, left axilla, abdomen.  Duration: 3 days  Signs / symptoms: itching and redness (bumps)  Treatments tried: TAC crm qhs.  Improvement on treatment: no relief    Also took benadryl last night - unsure if helped because she quickly fell asleep.    Saw PCP 3 wks ago for a different rash on left breast - dx with contact derm. Rash resolved within a few days of starting TAC crm.    Pt saw Dr. Castro in 8/2018 for contact derm 2/2 to poison ivy when working in her yard.  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 Was initially only on right chest, started scratching, and rash spread. Denies any contact with the trees or any objects that may have been affected by them.    Review of Systems   Constitutional: Negative for fever and chills.   Eyes: Negative for itching and eye watering.   Skin: Positive for rash. Negative for itching.   Hematologic/Lymphatic: Does not bruise/bleed easily.   Allergic/Immunologic: Negative for environmental allergies.        Poison ivy        Objective:    Physical Exam   Constitutional: She appears well-developed and well-nourished. She is obese.  No distress.   Neurological: She is alert and oriented to person, place, and time. She is not disoriented.   Psychiatric: She has a normal mood and affect.   Skin:   Areas Examined (abnormalities noted in diagram):   Head / Face Inspection Performed  Neck Inspection Performed  Chest / Axilla Inspection Performed  Back Inspection Performed  RUE Inspected  LUE Inspection Performed                   Diagram Legend     Erythematous scaling macule/papule c/w actinic keratosis       Vascular papule c/w angioma      Pigmented verrucoid papule/plaque c/w seborrheic keratosis       Yellow umbilicated papule c/w sebaceous hyperplasia      Irregularly shaped tan macule c/w lentigo     1-2 mm smooth white papules consistent with Milia      Movable subcutaneous cyst with punctum c/w epidermal inclusion cyst      Subcutaneous movable cyst c/w pilar cyst      Firm pink to brown papule c/w dermatofibroma      Pedunculated fleshy papule(s) c/w skin tag(s)      Evenly pigmented macule c/w junctional nevus     Mildly variegated pigmented, slightly irregular-bordered macule c/w mildly atypical nevus      Flesh colored to evenly pigmented papule c/w intradermal nevus       Pink pearly papule/plaque c/w basal cell carcinoma      Erythematous hyperkeratotic cursted plaque c/w SCC      Surgical scar with no sign of skin cancer recurrence      Open and closed comedones      Inflammatory papules and pustules      Verrucoid papule consistent consistent with wart     Erythematous eczematous patches and plaques     Dystrophic onycholytic nail with subungual debris c/w onychomycosis     Umbilicated papule    Erythematous-base heme-crusted tan verrucoid plaque consistent with inflamed seborrheic keratosis     Erythematous Silvery Scaling Plaque c/w Psoriasis     See annotation      Assessment / Plan:      Rhus dermatitis  -     predniSONE (DELTASONE) 20 MG tablet; Take 3 pills po qam with breakfast x 1 wk then take 2 po qam with breakfast x 1 wk then take 1 po qam with breakfast x 1 wk  Dispense: 42 tablet; Refill: 0  -     triamcinolone acetonide 0.1% (KENALOG) 0.1 % cream; AAA bid  Dispense: 454 g; Refill: 0    Discussed benefits and risks of treatment with prednisone including but not limited to increased appetite, weight gain, irritability, insomnia, fluid retention, GI upset, increased blood pressure, and increased blood sugars. If Prednisone is needed long term (>4 weeks), additional side effects such as kidney stones, gastric ulceration, avascular necrosis of femoral head may be encountered.    Continue  benadryl qhs/ prn for itching.       Follow up if symptoms worsen or fail to improve.

## 2019-04-23 ENCOUNTER — TELEPHONE (OUTPATIENT)
Dept: FAMILY MEDICINE | Facility: CLINIC | Age: 54
End: 2019-04-23

## 2019-05-20 ENCOUNTER — PATIENT OUTREACH (OUTPATIENT)
Dept: ADMINISTRATIVE | Facility: HOSPITAL | Age: 54
End: 2019-05-20

## 2019-05-20 NOTE — PROGRESS NOTES
Spoke with pt in regards to overdue HM,stated that she would like to complete the Fit kit, will mail out today. Pt declined to schedule mammogram at this time. Please discuss with pt at office visit. Pap scheduled for date of office visit.

## 2019-05-22 ENCOUNTER — TELEPHONE (OUTPATIENT)
Dept: FAMILY MEDICINE | Facility: CLINIC | Age: 54
End: 2019-05-22

## 2019-06-03 ENCOUNTER — TELEPHONE (OUTPATIENT)
Dept: FAMILY MEDICINE | Facility: CLINIC | Age: 54
End: 2019-06-03

## 2019-06-03 ENCOUNTER — LAB VISIT (OUTPATIENT)
Dept: LAB | Facility: HOSPITAL | Age: 54
End: 2019-06-03
Attending: FAMILY MEDICINE
Payer: COMMERCIAL

## 2019-06-03 ENCOUNTER — OFFICE VISIT (OUTPATIENT)
Dept: FAMILY MEDICINE | Facility: CLINIC | Age: 54
End: 2019-06-03
Payer: COMMERCIAL

## 2019-06-03 VITALS
RESPIRATION RATE: 18 BRPM | SYSTOLIC BLOOD PRESSURE: 122 MMHG | HEART RATE: 75 BPM | OXYGEN SATURATION: 99 % | BODY MASS INDEX: 32.38 KG/M2 | HEIGHT: 59 IN | WEIGHT: 160.63 LBS | TEMPERATURE: 98 F | DIASTOLIC BLOOD PRESSURE: 80 MMHG

## 2019-06-03 DIAGNOSIS — E55.9 VITAMIN D DEFICIENCY: Chronic | ICD-10-CM

## 2019-06-03 DIAGNOSIS — I10 ESSENTIAL HYPERTENSION: ICD-10-CM

## 2019-06-03 DIAGNOSIS — Z00.00 ROUTINE GENERAL MEDICAL EXAMINATION AT A HEALTH CARE FACILITY: Primary | ICD-10-CM

## 2019-06-03 DIAGNOSIS — E04.1 THYROID NODULE: ICD-10-CM

## 2019-06-03 DIAGNOSIS — Z00.00 ROUTINE GENERAL MEDICAL EXAMINATION AT A HEALTH CARE FACILITY: ICD-10-CM

## 2019-06-03 DIAGNOSIS — M54.41 ACUTE MIDLINE LOW BACK PAIN WITH RIGHT-SIDED SCIATICA: ICD-10-CM

## 2019-06-03 DIAGNOSIS — E04.1 THYROID NODULE: Primary | ICD-10-CM

## 2019-06-03 LAB
25(OH)D3+25(OH)D2 SERPL-MCNC: 13 NG/ML (ref 30–96)
ALBUMIN SERPL BCP-MCNC: 3.8 G/DL (ref 3.5–5.2)
ALP SERPL-CCNC: 79 U/L (ref 55–135)
ALT SERPL W/O P-5'-P-CCNC: 11 U/L (ref 10–44)
ANION GAP SERPL CALC-SCNC: 7 MMOL/L (ref 8–16)
AST SERPL-CCNC: 17 U/L (ref 10–40)
BASOPHILS # BLD AUTO: 0.01 K/UL (ref 0–0.2)
BASOPHILS NFR BLD: 0.2 % (ref 0–1.9)
BILIRUB SERPL-MCNC: 0.6 MG/DL (ref 0.1–1)
BUN SERPL-MCNC: 17 MG/DL (ref 6–20)
CALCIUM SERPL-MCNC: 9.6 MG/DL (ref 8.7–10.5)
CHLORIDE SERPL-SCNC: 109 MMOL/L (ref 95–110)
CHOLEST SERPL-MCNC: 149 MG/DL (ref 120–199)
CHOLEST/HDLC SERPL: 3.7 {RATIO} (ref 2–5)
CO2 SERPL-SCNC: 26 MMOL/L (ref 23–29)
CREAT SERPL-MCNC: 0.9 MG/DL (ref 0.5–1.4)
DIFFERENTIAL METHOD: ABNORMAL
EOSINOPHIL # BLD AUTO: 0.1 K/UL (ref 0–0.5)
EOSINOPHIL NFR BLD: 1.9 % (ref 0–8)
ERYTHROCYTE [DISTWIDTH] IN BLOOD BY AUTOMATED COUNT: 13.5 % (ref 11.5–14.5)
EST. GFR  (AFRICAN AMERICAN): >60 ML/MIN/1.73 M^2
EST. GFR  (NON AFRICAN AMERICAN): >60 ML/MIN/1.73 M^2
GLUCOSE SERPL-MCNC: 114 MG/DL (ref 70–110)
HCT VFR BLD AUTO: 43.8 % (ref 37–48.5)
HDLC SERPL-MCNC: 40 MG/DL (ref 40–75)
HDLC SERPL: 26.8 % (ref 20–50)
HGB BLD-MCNC: 13.8 G/DL (ref 12–16)
IMM GRANULOCYTES # BLD AUTO: 0.01 K/UL (ref 0–0.04)
IMM GRANULOCYTES NFR BLD AUTO: 0.2 % (ref 0–0.5)
LDLC SERPL CALC-MCNC: 76.6 MG/DL (ref 63–159)
LYMPHOCYTES # BLD AUTO: 1.5 K/UL (ref 1–4.8)
LYMPHOCYTES NFR BLD: 26.2 % (ref 18–48)
MCH RBC QN AUTO: 27.2 PG (ref 27–31)
MCHC RBC AUTO-ENTMCNC: 31.5 G/DL (ref 32–36)
MCV RBC AUTO: 86 FL (ref 82–98)
MONOCYTES # BLD AUTO: 0.2 K/UL (ref 0.3–1)
MONOCYTES NFR BLD: 4.1 % (ref 4–15)
NEUTROPHILS # BLD AUTO: 4 K/UL (ref 1.8–7.7)
NEUTROPHILS NFR BLD: 67.4 % (ref 38–73)
NONHDLC SERPL-MCNC: 109 MG/DL
NRBC BLD-RTO: 0 /100 WBC
PLATELET # BLD AUTO: 187 K/UL (ref 150–350)
PMV BLD AUTO: 11.9 FL (ref 9.2–12.9)
POTASSIUM SERPL-SCNC: 3.8 MMOL/L (ref 3.5–5.1)
PROT SERPL-MCNC: 7 G/DL (ref 6–8.4)
RBC # BLD AUTO: 5.07 M/UL (ref 4–5.4)
SODIUM SERPL-SCNC: 142 MMOL/L (ref 136–145)
TRIGL SERPL-MCNC: 162 MG/DL (ref 30–150)
TSH SERPL DL<=0.005 MIU/L-ACNC: 0.84 UIU/ML (ref 0.4–4)
WBC # BLD AUTO: 5.87 K/UL (ref 3.9–12.7)

## 2019-06-03 PROCEDURE — 80061 LIPID PANEL: CPT

## 2019-06-03 PROCEDURE — 82306 VITAMIN D 25 HYDROXY: CPT

## 2019-06-03 PROCEDURE — 99999 PR PBB SHADOW E&M-EST. PATIENT-LVL III: ICD-10-PCS | Mod: PBBFAC,,, | Performed by: FAMILY MEDICINE

## 2019-06-03 PROCEDURE — 3074F SYST BP LT 130 MM HG: CPT | Mod: CPTII,S$GLB,, | Performed by: FAMILY MEDICINE

## 2019-06-03 PROCEDURE — 3079F PR MOST RECENT DIASTOLIC BLOOD PRESSURE 80-89 MM HG: ICD-10-PCS | Mod: CPTII,S$GLB,, | Performed by: FAMILY MEDICINE

## 2019-06-03 PROCEDURE — 85025 COMPLETE CBC W/AUTO DIFF WBC: CPT

## 2019-06-03 PROCEDURE — 99396 PR PREVENTIVE VISIT,EST,40-64: ICD-10-PCS | Mod: S$GLB,,, | Performed by: FAMILY MEDICINE

## 2019-06-03 PROCEDURE — 36415 COLL VENOUS BLD VENIPUNCTURE: CPT | Mod: PO

## 2019-06-03 PROCEDURE — 3079F DIAST BP 80-89 MM HG: CPT | Mod: CPTII,S$GLB,, | Performed by: FAMILY MEDICINE

## 2019-06-03 PROCEDURE — 84443 ASSAY THYROID STIM HORMONE: CPT

## 2019-06-03 PROCEDURE — 80053 COMPREHEN METABOLIC PANEL: CPT

## 2019-06-03 PROCEDURE — 99999 PR PBB SHADOW E&M-EST. PATIENT-LVL III: CPT | Mod: PBBFAC,,, | Performed by: FAMILY MEDICINE

## 2019-06-03 PROCEDURE — 99396 PREV VISIT EST AGE 40-64: CPT | Mod: S$GLB,,, | Performed by: FAMILY MEDICINE

## 2019-06-03 PROCEDURE — 3074F PR MOST RECENT SYSTOLIC BLOOD PRESSURE < 130 MM HG: ICD-10-PCS | Mod: CPTII,S$GLB,, | Performed by: FAMILY MEDICINE

## 2019-06-03 NOTE — PROGRESS NOTES
Chief Complaint   Patient presents with    Annual Exam       HPI  Rika June is a 53 y.o. female with multiple medical diagnoses as listed in the medical history and problem list that presents for annual exam.    She reports intermittent back pain that is followed by workman's comp at her Job. She does not exercise outside of work    Has been seen by ENT for pulsatile tinnitus but did not start acetazolamide    PAST MEDICAL HISTORY:  Past Medical History:   Diagnosis Date    Allergic rhinitis     Allergy     Asthma        PAST SURGICAL HISTORY:  Past Surgical History:   Procedure Laterality Date     SECTION      TUBAL LIGATION         SOCIAL HISTORY:  Social History     Socioeconomic History    Marital status: Legally      Spouse name: Not on file    Number of children: Not on file    Years of education: Not on file    Highest education level: Not on file   Occupational History    Not on file   Social Needs    Financial resource strain: Not on file    Food insecurity:     Worry: Not on file     Inability: Not on file    Transportation needs:     Medical: Not on file     Non-medical: Not on file   Tobacco Use    Smoking status: Never Smoker    Smokeless tobacco: Never Used   Substance and Sexual Activity    Alcohol use: No    Drug use: No    Sexual activity: Yes     Partners: Male   Lifestyle    Physical activity:     Days per week: Not on file     Minutes per session: Not on file    Stress: Not on file   Relationships    Social connections:     Talks on phone: Not on file     Gets together: Not on file     Attends Faith service: Not on file     Active member of club or organization: Not on file     Attends meetings of clubs or organizations: Not on file     Relationship status: Not on file   Other Topics Concern    Are you pregnant or think you may be? No    Breast-feeding No   Social History Narrative    Not on file       FAMILY HISTORY:  Family History   Problem  "Relation Age of Onset    Breast cancer Mother     Cancer Father         throat    Diabetes Maternal Grandmother     Ovarian cancer Neg Hx     Stroke Neg Hx     Hypertension Neg Hx     Melanoma Neg Hx        ALLERGIES AND MEDICATIONS: updated and reviewed.  Review of patient's allergies indicates:   Allergen Reactions    Hydrocodone-acetaminophen Nausea Only and Nausea And Vomiting     Other reaction(s): Nausea     Current Outpatient Medications   Medication Sig Dispense Refill    albuterol (PROVENTIL) 2.5 mg /3 mL (0.083 %) nebulizer solution Take 3 mLs (2.5 mg total) by nebulization every 6 (six) hours as needed. 1 Box 0     No current facility-administered medications for this visit.        ROS  Review of Systems   Constitutional: Negative for chills, diaphoresis, fatigue, fever and unexpected weight change.   HENT: Positive for tinnitus. Negative for rhinorrhea, sinus pressure and sore throat.    Eyes: Negative for photophobia and visual disturbance.   Respiratory: Negative for cough, shortness of breath and wheezing.    Cardiovascular: Negative for chest pain and palpitations.   Gastrointestinal: Negative for abdominal pain, blood in stool, constipation, diarrhea, nausea and vomiting.   Genitourinary: Negative for dysuria, flank pain, frequency and vaginal discharge.   Musculoskeletal: Positive for back pain. Negative for arthralgias and joint swelling.   Skin: Negative for rash.   Neurological: Negative for speech difficulty, weakness, light-headedness and headaches.   Psychiatric/Behavioral: Negative for behavioral problems and dysphoric mood.       Physical Exam  Vitals:    06/03/19 0718   BP: 122/80   Pulse: 75   Resp: 18   Temp: 97.8 °F (36.6 °C)   TempSrc: Oral   SpO2: 99%   Weight: 72.8 kg (160 lb 9.7 oz)   Height: 4' 11" (1.499 m)    Body mass index is 32.44 kg/m².  Weight: 72.8 kg (160 lb 9.7 oz)   Height: 4' 11" (149.9 cm)     Physical Exam   Constitutional: She is oriented to person, place, " and time. She appears well-developed and well-nourished. No distress.   HENT:   Head: Normocephalic and atraumatic.   Right Ear: Tympanic membrane normal.   Left Ear: Tympanic membrane normal.   Nose: Nose normal.   Mouth/Throat: No oropharyngeal exudate.   Eyes: EOM are normal.   Neck: Neck supple. No thyromegaly present.   Cardiovascular: Normal rate and regular rhythm. Exam reveals no gallop and no friction rub.   No murmur heard.  Pulmonary/Chest: Effort normal and breath sounds normal. No respiratory distress. She has no wheezes. She has no rales.   Abdominal: Soft. Bowel sounds are normal. She exhibits no distension and no mass. There is no tenderness. There is no rebound and no guarding.   Musculoskeletal:        Arms:  Lymphadenopathy:     She has no cervical adenopathy.   Neurological: She is alert and oriented to person, place, and time.   Skin: Skin is warm and dry. No rash noted.   Psychiatric: She has a normal mood and affect. Her behavior is normal.   Nursing note and vitals reviewed.      Health Maintenance       Date Due Completion Date    Fecal Occult Blood Test (FOBT)/FitKit 1965 ---    Pap Smear with HPV Cotest 06/21/2016 6/21/2013    Mammogram 06/17/2019 (Originally 8/15/2003) ---    TETANUS VACCINE 11/29/2019 (Originally 8/15/1981) ---    Influenza Vaccine 08/01/2019 10/11/2017    Override on 10/11/2017: Done    Override on 12/31/2015: Declined    Lipid Panel 10/11/2022 10/11/2017          Health maintenance reviewed and addressed as ordered      ASSESSMENT     1. Routine general medical examination at a health care facility    2. Essential hypertension    3. Thyroid nodule    4. Vitamin D deficiency    5. Acute midline low back pain with right-sided sciatica        PLAN:     Problem List Items Addressed This Visit        Cardiac/Vascular    Essential hypertension  -controlled  -she is going to begin acetazolamide for pulsatile tinnitus and f/u w/ ENT       Endocrine    Thyroid nodule     Relevant Orders    TSH    Vitamin D deficiency (Chronic)    Relevant Orders    Vitamin D      Other Visit Diagnoses     Routine general medical examination at a health care facility    -  Primary  --discussed healthy lifestyle modification with exercise and healthy diet, reviewed age appropriate screening and healthy maintenance      Relevant Orders    CBC auto differential    Comprehensive metabolic panel    Lipid panel    Acute midline low back pain with right-sided sciatica      -recommend she begin an exercise routine outside of work  -f/u w/ workman's comp physician            Elizabeth Santo MD  06/03/2019 7:56 AM        Follow up in about 1 year (around 6/3/2020) for annual exam.

## 2019-06-03 NOTE — LETTER
Torri 3, 2019      [facility]  [address] [facility]  [place] [address]  Phone: [phone]  Fax: [phone]       Patient: Rika June   YOB: 1965  Date of Visit: 06/03/2019    To Whom It May Concern:    Jory June  was at [facility] on 06/03/2019. She may return to work/school on 06/03/2019 with no restrictions. If you have any questions or concerns, or if I can be of further assistance, please do not hesitate to contact me.    Sincerely,    Deya Ovalles MA

## 2019-06-05 NOTE — TELEPHONE ENCOUNTER
----- Message from Suzie Love sent at 6/5/2019  2:05 PM CDT -----  Contact: Patient  Type: RX Refill Request    Who Called: Patient    Refill or New Rx: Refill    RX Name and Strength: fluconazole (DIFLUCAN) 150 MG Tab    How is the patient currently taking it? (ex. 1XDay):    Is this a 30 day or 90 day RX:    Preferred Pharmacy with phone number: [facility] [address] [place], [place]  [facility] [facility] [address] [phone] (Phone)  [phone] (Fax)        Local or Mail Order: Local    Ordering Provider:Dr. Santo    Would the patient rather a call back or a response via [facility]? Call back     Best Call Back Number:[phone]    Additional Information:

## 2019-06-06 ENCOUNTER — TELEPHONE (OUTPATIENT)
Dept: FAMILY MEDICINE | Facility: CLINIC | Age: 54
End: 2019-06-06

## 2019-06-06 RX ORDER — FLUCONAZOLE 150 MG/1
150 TABLET ORAL DAILY
Qty: 1 TABLET | Refills: 0 | Status: SHIPPED | OUTPATIENT
Start: 2019-06-06 | End: 2019-06-07

## 2019-06-06 NOTE — TELEPHONE ENCOUNTER
LVM for patient to return call to the clinic in regards of yeast infection medication.     Thanks,  Clarissa

## 2019-06-06 NOTE — TELEPHONE ENCOUNTER
----- Message from Milton Christiansen sent at 6/6/2019 12:13 PM CDT -----  Contact: pt   .Type: Patient Call Back    Who called: pt     What is the request in detail:Pt states that she was suppose to receive yeast infection meds but the never went to the Pharmacy     Can the clinic reply by [facility]?yes    Would the patient rather a call back or a response via [facility]? Call back     Best call back number:[phone]    Additional Information:

## 2019-07-03 ENCOUNTER — PATIENT OUTREACH (OUTPATIENT)
Dept: ADMINISTRATIVE | Facility: OTHER | Age: 54
End: 2019-07-03

## 2019-08-29 ENCOUNTER — PATIENT OUTREACH (OUTPATIENT)
Dept: ADMINISTRATIVE | Facility: HOSPITAL | Age: 54
End: 2019-08-29

## 2019-08-29 DIAGNOSIS — Z12.39 BREAST CANCER SCREENING: Primary | ICD-10-CM

## 2019-09-14 ENCOUNTER — PATIENT OUTREACH (OUTPATIENT)
Dept: ADMINISTRATIVE | Facility: OTHER | Age: 54
End: 2019-09-14

## 2019-09-16 ENCOUNTER — OFFICE VISIT (OUTPATIENT)
Dept: OBSTETRICS AND GYNECOLOGY | Facility: CLINIC | Age: 54
End: 2019-09-16
Payer: COMMERCIAL

## 2019-09-16 VITALS
BODY MASS INDEX: 32.85 KG/M2 | HEIGHT: 59 IN | WEIGHT: 162.94 LBS | SYSTOLIC BLOOD PRESSURE: 121 MMHG | DIASTOLIC BLOOD PRESSURE: 75 MMHG

## 2019-09-16 DIAGNOSIS — Z01.419 ENCOUNTER FOR GYNECOLOGICAL EXAMINATION WITHOUT ABNORMAL FINDING: Primary | ICD-10-CM

## 2019-09-16 DIAGNOSIS — N89.8 VAGINA ITCHING: ICD-10-CM

## 2019-09-16 DIAGNOSIS — Z12.39 BREAST CANCER SCREENING: ICD-10-CM

## 2019-09-16 PROCEDURE — 3074F PR MOST RECENT SYSTOLIC BLOOD PRESSURE < 130 MM HG: ICD-10-PCS | Mod: CPTII,S$GLB,, | Performed by: OBSTETRICS & GYNECOLOGY

## 2019-09-16 PROCEDURE — 99999 PR PBB SHADOW E&M-EST. PATIENT-LVL III: CPT | Mod: PBBFAC,,, | Performed by: OBSTETRICS & GYNECOLOGY

## 2019-09-16 PROCEDURE — 87481 CANDIDA DNA AMP PROBE: CPT | Mod: 59

## 2019-09-16 PROCEDURE — 3078F DIAST BP <80 MM HG: CPT | Mod: CPTII,S$GLB,, | Performed by: OBSTETRICS & GYNECOLOGY

## 2019-09-16 PROCEDURE — 87661 TRICHOMONAS VAGINALIS AMPLIF: CPT

## 2019-09-16 PROCEDURE — 99999 PR PBB SHADOW E&M-EST. PATIENT-LVL III: ICD-10-PCS | Mod: PBBFAC,,, | Performed by: OBSTETRICS & GYNECOLOGY

## 2019-09-16 PROCEDURE — 87801 DETECT AGNT MULT DNA AMPLI: CPT

## 2019-09-16 PROCEDURE — 3074F SYST BP LT 130 MM HG: CPT | Mod: CPTII,S$GLB,, | Performed by: OBSTETRICS & GYNECOLOGY

## 2019-09-16 PROCEDURE — 99396 PREV VISIT EST AGE 40-64: CPT | Mod: S$GLB,,, | Performed by: OBSTETRICS & GYNECOLOGY

## 2019-09-16 PROCEDURE — 88175 CYTOPATH C/V AUTO FLUID REDO: CPT

## 2019-09-16 PROCEDURE — 99396 PR PREVENTIVE VISIT,EST,40-64: ICD-10-PCS | Mod: S$GLB,,, | Performed by: OBSTETRICS & GYNECOLOGY

## 2019-09-16 PROCEDURE — 3078F PR MOST RECENT DIASTOLIC BLOOD PRESSURE < 80 MM HG: ICD-10-PCS | Mod: CPTII,S$GLB,, | Performed by: OBSTETRICS & GYNECOLOGY

## 2019-09-16 RX ORDER — NYSTATIN 100000 [USP'U]/G
POWDER TOPICAL
Qty: 30 G | Refills: 3 | Status: SHIPPED | OUTPATIENT
Start: 2019-09-16 | End: 2020-03-25

## 2019-09-16 RX ORDER — FLUCONAZOLE 150 MG/1
150 TABLET ORAL ONCE AS NEEDED
Qty: 2 TABLET | Refills: 2 | Status: SHIPPED | OUTPATIENT
Start: 2019-09-16 | End: 2019-09-16

## 2019-09-16 NOTE — PROGRESS NOTES
CC: Well woman exam    Rika June is a 54 y.o. female  presents for a well woman exam.  LMP: No LMP recorded. Patient is premenopausal.. She has vaginal itching and groin irritation    Past Medical History:   Diagnosis Date    Allergic rhinitis     Allergy     Asthma      Past Surgical History:   Procedure Laterality Date     SECTION      TUBAL LIGATION       Social History     Socioeconomic History    Marital status: Legally      Spouse name: Not on file    Number of children: Not on file    Years of education: Not on file    Highest education level: Not on file   Occupational History    Not on file   Social Needs    Financial resource strain: Not on file    Food insecurity:     Worry: Not on file     Inability: Not on file    Transportation needs:     Medical: Not on file     Non-medical: Not on file   Tobacco Use    Smoking status: Never Smoker    Smokeless tobacco: Never Used   Substance and Sexual Activity    Alcohol use: No    Drug use: No    Sexual activity: Yes     Partners: Male   Lifestyle    Physical activity:     Days per week: Not on file     Minutes per session: Not on file    Stress: Not on file   Relationships    Social connections:     Talks on phone: Not on file     Gets together: Not on file     Attends L[employer]-day service: Not on file     Active member of club or organization: Not on file     Attends meetings of clubs or organizations: Not on file     Relationship status: Not on file   Other Topics Concern    Are you pregnant or think you may be? No    Breast-feeding No   Social History Narrative    Not on file     Family History   Problem Relation Age of Onset    Breast cancer Mother     Cancer Father         throat    Diabetes Maternal Grandmother     Ovarian cancer Neg Hx     Stroke Neg Hx     Hypertension Neg Hx     Melanoma Neg Hx      OB History        2    Para   2    Term                AB        Living           SAB      "   TAB        Ectopic        Multiple        Live Births                     /75   Ht 4' 11" (1.499 m)   Wt 73.9 kg (162 lb 14.7 oz)   BMI 32.91 kg/m²       ROS:    ROS:  GENERAL: Denies weight gain or weight loss. Feeling well overall.   SKIN: Denies rash or lesions.   HEAD: Denies head injury or headache.   NODES: Denies enlarged lymph nodes.   CHEST: Denies chest pain or shortness of breath.   CARDIOVASCULAR: Denies palpitations or left sided chest pain.   ABDOMEN: No abdominal pain, constipation, diarrhea, nausea, vomiting or rectal bleeding.   URINARY: No frequency, dysuria, hematuria, or burning on urination.  REPRODUCTIVE: See HPI.   BREASTS: The patient performs breast self-examination and denies pain, lumps, or nipple discharge.   HEMATOLOGIC: No easy bruisability or excessive bleeding.   MUSCULOSKELETAL: Denies joint pain or swelling.   NEUROLOGIC: Denies syncope or weakness.   PSYCHIATRIC: Denies depression, anxiety or mood swings.    PHYSICAL EXAM:    APPEARANCE: Well nourished, well developed, in no acute distress.  AFFECT: WNL, alert and oriented x 3  SKIN: No acne or hirsutism  NECK: Neck symmetric without masses or thyromegaly  NODES: No inguinal, cervical, axillary, or femoral lymph node enlargement  CHEST: Good respiratory effect  ABDOMEN: Soft.  No tenderness or masses.  No hepatosplenomegaly.  No hernias.  BREASTS: Symmetrical, no skin changes or visible lesions.  No palpable masses, nipple discharge bilaterally.  PELVIC: Normal external genitalia without lesions.  Normal hair distribution.  Adequate perineal body, normal urethral meatus.  Vagina moist and well rugated without lesions white discharge.  Cervix pink, without lesions, discharge or tenderness.  No significant cystocele or rectocele.  Bimanual exam shows uterus to be normal size, regular, mobile and nontender.  Adnexa without masses or tenderness.    RECTAL: Rectovaginal exam confirms above with normal sphincter tone, no " masses.  EXTREMITIES: No edema.      ICD-10-CM ICD-9-CM    1. Encounter for gynecological examination without abnormal finding Z01.419 V72.31 Liquid-based pap smear, screening   2. Breast cancer screening Z12.31 V76.10 Mammo Digital Screening Bilat w/ Bucky   3. Vagina itching N89.8 698.1 Vaginosis Screen by DNA Probe      fluconazole (DIFLUCAN) 150 MG Tab      nystatin (MYCOSTATIN) powder     Vaginitis prevention including :   a. avoiding feminine products such as deoderant soaps, body wash, bubble bath, douches, scented toilet paper, deoderant tampons or pads, baby or feminine wipes, chronic pad use, etc. and   b. avoiding other vulvovaginal irritants such as long hot baths, humidity, tight, synthetic clothing, chlorine and sitting around in wet bathing suits and   c. wearing cotton underwear, avoiding thong underwear and no underwear to bed and   d. taking showers instead of baths and use a hair dryer on cool setting afterwards to dry and   e.wearing cotton to exercise and shower immediately after exercise and change clothes and   f. using polyurethane condoms without spermicide if sexually active and symptoms are triggered by intercourse;   Diflucan and Mycolog cream use and potential side effects;   -pelvic rest until symptoms resolve.             Patient was counseled today on A.C.S. Pap guidelines and recommendations for yearly pelvic exams, mammograms and monthly self breast exams; to see her PCP for other health maintenance.     No follow-ups on file.

## 2019-09-17 LAB
BACTERIAL VAGINOSIS DNA: POSITIVE
CANDIDA GLABRATA DNA: NEGATIVE
CANDIDA KRUSEI DNA: NEGATIVE
CANDIDA RRNA VAG QL PROBE: NEGATIVE
T VAGINALIS RRNA GENITAL QL PROBE: NEGATIVE

## 2019-10-03 ENCOUNTER — OFFICE VISIT (OUTPATIENT)
Dept: FAMILY MEDICINE | Facility: CLINIC | Age: 54
End: 2019-10-03
Payer: COMMERCIAL

## 2019-10-03 VITALS
TEMPERATURE: 98 F | OXYGEN SATURATION: 99 % | BODY MASS INDEX: 32.67 KG/M2 | WEIGHT: 162.06 LBS | HEIGHT: 59 IN | DIASTOLIC BLOOD PRESSURE: 86 MMHG | HEART RATE: 95 BPM | SYSTOLIC BLOOD PRESSURE: 144 MMHG

## 2019-10-03 DIAGNOSIS — R05.9 COUGH: ICD-10-CM

## 2019-10-03 DIAGNOSIS — J20.8 ACUTE BACTERIAL BRONCHITIS: Primary | ICD-10-CM

## 2019-10-03 DIAGNOSIS — I10 ESSENTIAL HYPERTENSION: ICD-10-CM

## 2019-10-03 DIAGNOSIS — B96.89 ACUTE BACTERIAL BRONCHITIS: Primary | ICD-10-CM

## 2019-10-03 PROCEDURE — 99999 PR PBB SHADOW E&M-EST. PATIENT-LVL III: CPT | Mod: PBBFAC,,, | Performed by: NURSE PRACTITIONER

## 2019-10-03 PROCEDURE — 3079F DIAST BP 80-89 MM HG: CPT | Mod: CPTII,S$GLB,, | Performed by: NURSE PRACTITIONER

## 2019-10-03 PROCEDURE — 99999 PR PBB SHADOW E&M-EST. PATIENT-LVL III: ICD-10-PCS | Mod: PBBFAC,,, | Performed by: NURSE PRACTITIONER

## 2019-10-03 PROCEDURE — 99214 PR OFFICE/OUTPT VISIT, EST, LEVL IV, 30-39 MIN: ICD-10-PCS | Mod: S$GLB,,, | Performed by: NURSE PRACTITIONER

## 2019-10-03 PROCEDURE — 3008F BODY MASS INDEX DOCD: CPT | Mod: CPTII,S$GLB,, | Performed by: NURSE PRACTITIONER

## 2019-10-03 PROCEDURE — 3077F SYST BP >= 140 MM HG: CPT | Mod: CPTII,S$GLB,, | Performed by: NURSE PRACTITIONER

## 2019-10-03 PROCEDURE — 99214 OFFICE O/P EST MOD 30 MIN: CPT | Mod: S$GLB,,, | Performed by: NURSE PRACTITIONER

## 2019-10-03 PROCEDURE — 3008F PR BODY MASS INDEX (BMI) DOCUMENTED: ICD-10-PCS | Mod: CPTII,S$GLB,, | Performed by: NURSE PRACTITIONER

## 2019-10-03 PROCEDURE — 3077F PR MOST RECENT SYSTOLIC BLOOD PRESSURE >= 140 MM HG: ICD-10-PCS | Mod: CPTII,S$GLB,, | Performed by: NURSE PRACTITIONER

## 2019-10-03 PROCEDURE — 3079F PR MOST RECENT DIASTOLIC BLOOD PRESSURE 80-89 MM HG: ICD-10-PCS | Mod: CPTII,S$GLB,, | Performed by: NURSE PRACTITIONER

## 2019-10-03 RX ORDER — PREDNISONE 10 MG/1
TABLET ORAL
Qty: 14 TABLET | Refills: 0 | Status: SHIPPED | OUTPATIENT
Start: 2019-10-03 | End: 2020-03-25

## 2019-10-03 RX ORDER — AZITHROMYCIN 250 MG/1
TABLET, FILM COATED ORAL
Qty: 6 TABLET | Refills: 0 | Status: SHIPPED | OUTPATIENT
Start: 2019-10-03 | End: 2019-10-08

## 2019-10-03 NOTE — LETTER
October 3, 2019      [facility]  [address]  [place] [address]  Phone: [phone]  Fax: [phone]       Patient: Rika June   YOB: 1965  Date of Visit: 10/03/2019    To Whom It May Concern:    Jory June  was at [facility] on 10/03/2019.  She may return to work on 10/07/2019 with no restrictions. If you have any questions or concerns, or if I can be of further assistance, please do not hesitate to contact me.    Sincerely,         Danuta Smiley MA

## 2019-10-03 NOTE — PROGRESS NOTES
Subjective:       Patient ID: Rika June is a 54 y.o. female.    Chief Complaint: Chest Pain; Cough; and Back Pain (soreness)    Cough   This is a new problem. The current episode started in the past 7 days. The problem has been gradually worsening. The problem occurs every few minutes. The cough is non-productive. Associated symptoms include chest pain, chills, myalgias, nasal congestion and shortness of breath. Pertinent negatives include no ear congestion, ear pain, fever, headaches, heartburn, hemoptysis, postnasal drip, rash, rhinorrhea, sore throat, sweats, weight loss or wheezing. Nothing aggravates the symptoms. She has tried a beta-agonist inhaler for the symptoms. The treatment provided mild relief.     Review of Systems   Constitutional: Positive for chills. Negative for fever and weight loss.   HENT: Positive for congestion. Negative for ear pain, postnasal drip, rhinorrhea, sinus pressure, sinus pain, sneezing and sore throat.    Respiratory: Positive for cough, chest tightness and shortness of breath. Negative for hemoptysis and wheezing.    Cardiovascular: Positive for chest pain.   Gastrointestinal: Negative for heartburn.   Musculoskeletal: Positive for back pain and myalgias.   Skin: Negative for rash.   Neurological: Negative for headaches.       Objective:      Physical Exam   Constitutional: She appears well-developed and well-nourished. No distress.   HENT:   Head: Normocephalic and atraumatic.   Right Ear: Tympanic membrane, external ear and ear canal normal.   Left Ear: Tympanic membrane, external ear and ear canal normal.   Nose: Mucosal edema and rhinorrhea present. Right sinus exhibits maxillary sinus tenderness and frontal sinus tenderness. Left sinus exhibits maxillary sinus tenderness and frontal sinus tenderness.   Mouth/Throat: Uvula is midline and oropharynx is clear and moist. Mucous membranes are not dry. No oropharyngeal exudate, posterior oropharyngeal edema or posterior  oropharyngeal erythema.   Cardiovascular: Normal rate and regular rhythm.   No murmur heard.  Pulmonary/Chest: Effort normal and breath sounds normal. She has no wheezes. She has no rales.   Lymphadenopathy:     She has no cervical adenopathy.   Skin: Skin is warm and dry.   Nursing note and vitals reviewed.      Assessment:       1. Acute bacterial bronchitis    2. Cough    3. Essential hypertension        Plan:       Rika was seen today for chest pain, cough and back pain.    Diagnoses and all orders for this visit:    Acute bacterial bronchitis  -     predniSONE (DELTASONE) 10 MG tablet; 2 tab po qday x 4 days, then 1 tab po qday x 4 days, then 1/2 tab po qday x 4 days  -     azithromycin (Z-SOLO) 250 MG tablet; Take 2 tablets by mouth on day 1; Take 1 tablet by mouth on days 2-5  -     brompheniramine-pseudoephedrine-dextromethorphan (DIMETAPP DM) 1-15-5 mg/5 mL Elix; Take 5 mLs by mouth every 6 (six) hours as needed.    Cough  -     brompheniramine-pseudoephedrine-dextromethorphan (DIMETAPP DM) 1-15-5 mg/5 mL Elix; Take 5 mLs by mouth every 6 (six) hours as needed.  HOME CARE:  · Drink plenty of water, hot tea, and other liquids to stay well hydrated. This thins the mucus and promotes sinus drainage.  · Apply heat to the painful areas of the face. Use a towel soaked in hot water. Or,  the shower and direct the hot spray onto your face. This is a good way to inhale warm water vapor and get heat on your face at the same time. (Cover your mouth and nose with your hands so you can still breathe as you do this.)  · Use a vaporizer with products such as Vicks VapoRub (contains menthol) at night. Suck on peppermint, menthol or eucalyptus hard candies during the day.  · An expectorant containing guaifenesin (such as Robitussin), helps to thin the mucus and promote drainage from the sinuses.  · Over-the-counter decongestants may be used unless a similar medicine was prescribed. Nasal sprays work the fastest.  Use one that contains phenylephrine (Rigo-synephrine, Sinex and others) or oxymetazoline (Afrin). First blow the nose gently to remove mucus, then apply the drops. Do not use these medicines more often than directed on the label or for more than three days or symptoms may worsen. You may also use tablets containing pseudoephedrine (Sudafed). Many sinus remedies combine ingredients, which may increase side effects. Read the labels or ask the pharmacist for help. NOTE: Persons with high blood pressure should not use decongestants. They can raise blood pressure.  · Antihistamines are useful if allergies are a cause of your sinusitis. The mildest one is chlorpheniramine (available without a prescription). The dose for adults is 8-12mg three times a day. [NOTE: Do not use chlorpheniramine if you have glaucoma or if you are a man with trouble urinating due to an enlarged prostate.] Claritin (loratidine) is an antihistamine that causes less drowsiness and is a good alternative for daytime use.  · Do not use nasal rinses or irrigation during an acute sinus infection, unless advised by your doctor. Rinsing may spread the infection to other sinuses.  · You may use acetaminophen (Tylenol) or ibuprofen (Motrin, Advil) to control pain, unless another pain medicine was prescribed. [ NOTE: If you have chronic liver or kidney disease or ever had a stomach ulcer, talk with your doctor before using these medicines.] (Aspirin should never be used in anyone under 18 years of age who is ill with a fever. It may cause severe liver damage.)  · Finish the full course, even if you are feeling better after a few days.  FOLLOW UP with your doctor or this facility in one week or as instructed by our staff if not improving.  GET PROMPT MEDICAL ATTENTION if any of the following occur:  · Facial pain or headache becomes more severe  · Stiff neck  · Unusual drowsiness or confusion, or not acting like your normal self  · Swelling of the forehead or  eyelids  · Vision problems including blurred or double vision  · Fever of 100.4ºF (38ºC) or higher, or as directed by your healthcare provider  · Seizure  © 1755-4221 [employer], [address] [place] [address], [place] [address]. All rights reserved. This information is not intended as a substitute for professional medical care. Always follow your healthcare professional's instructions.      Essential hypertension  Avoid using decongestants with your history of High blood pressure. Continue current medications and follow up with PCP as scheduled.

## 2019-10-03 NOTE — PATIENT INSTRUCTIONS
HOME CARE:  If symptoms are severe, rest at home for the first 2-3 days. When you resume activity, don't let yourself get too tired.  Do not smoke. Avoid being exposed to the smoke of others.  You may use acetaminophen (Tylenol) or ibuprofen (Motrin, Advil) to control fever or pain, unless another medicine was prescribed for this. [NOTE: If you have chronic liver or kidney disease or ever had a stomach ulcer or GI bleeding, talk with your doctor before using these medicines.]  Your appetite may be poor, so a light diet is fine. Avoid dehydration by drinking 6-8 glasses of fluids per day (water, soft, drinks, juices, tea, soup, etc.). Extra fluids will help loosen secretions in the lungs.  Over-the-counter cough medicines that contain dextromethorphan (such as Robitussin DM) and decongestants (Actifed or Sudafed) may help relieve cough and congestion. [NOTE: Do not use decongestants if you have high blood pressure.]  Finish all antibiotic medicine, even if you are feeling better after only a few days.  FOLLOW UP with your doctor or as directed if you dont start to feel better after three days.  [NOTE: If you are age 65 or older, or if you have chronic asthma or COPD, we recommend a PNEUMOCOCCAL VACCINATION every five years and a yearly INFLUENZAVACCINATION (FLU-SHOT) every autumn. Ask your doctor about this. If you had an X-ray, a radiologist will review it. You will be notified of any new findings that may affect your care.]  GET PROMPT MEDICAL ATTENTION if any of the following occur:  Fever over 100.4°F (38.0°C) for more than three days  Trouble breathing, wheezing or pain with breathing  Coughing up blood or increased amounts of colored sputum  Weakness, drowsiness, headache, facial pain, ear pain or a stiff neck  © 8613-2652 [facility], [address] [place] [address], [place] [address]. All rights reserved. This information is not intended as a substitute for professional medical care. Always follow your  healthcare professional's instructions.

## 2019-10-13 ENCOUNTER — TELEPHONE (OUTPATIENT)
Dept: OBSTETRICS AND GYNECOLOGY | Facility: CLINIC | Age: 54
End: 2019-10-13

## 2019-10-13 DIAGNOSIS — B96.89 BACTERIAL VAGINITIS: Primary | ICD-10-CM

## 2019-10-13 DIAGNOSIS — N76.0 BACTERIAL VAGINITIS: Primary | ICD-10-CM

## 2019-10-13 RX ORDER — METRONIDAZOLE 500 MG/1
500 TABLET ORAL 2 TIMES DAILY
Qty: 14 TABLET | Refills: 0 | Status: SHIPPED | OUTPATIENT
Start: 2019-10-13 | End: 2019-10-20

## 2019-10-14 ENCOUNTER — TELEPHONE (OUTPATIENT)
Dept: OBSTETRICS AND GYNECOLOGY | Facility: CLINIC | Age: 54
End: 2019-10-14

## 2019-10-14 NOTE — TELEPHONE ENCOUNTER
Bacterial vaginosis present on vaginal cultures.  Flagyl Rx sent to your pharmacy.  Normal pap. AParise

## 2019-10-29 ENCOUNTER — PATIENT OUTREACH (OUTPATIENT)
Dept: ADMINISTRATIVE | Facility: HOSPITAL | Age: 54
End: 2019-10-29

## 2019-11-06 ENCOUNTER — PATIENT OUTREACH (OUTPATIENT)
Dept: ADMINISTRATIVE | Facility: HOSPITAL | Age: 54
End: 2019-11-06

## 2019-11-06 DIAGNOSIS — Z12.11 COLON CANCER SCREENING: Primary | ICD-10-CM

## 2019-11-23 ENCOUNTER — PATIENT OUTREACH (OUTPATIENT)
Dept: ADMINISTRATIVE | Facility: OTHER | Age: 54
End: 2019-11-23

## 2019-11-25 ENCOUNTER — OFFICE VISIT (OUTPATIENT)
Dept: DERMATOLOGY | Facility: CLINIC | Age: 54
End: 2019-11-25
Payer: COMMERCIAL

## 2019-11-25 ENCOUNTER — TELEPHONE (OUTPATIENT)
Dept: DERMATOLOGY | Facility: CLINIC | Age: 54
End: 2019-11-25

## 2019-11-25 VITALS — WEIGHT: 162 LBS | BODY MASS INDEX: 32.72 KG/M2

## 2019-11-25 DIAGNOSIS — L70.0 ACNE VULGARIS: ICD-10-CM

## 2019-11-25 DIAGNOSIS — L65.9 HAIR LOSS DISORDER: ICD-10-CM

## 2019-11-25 DIAGNOSIS — L70.0 ACNE VULGARIS: Primary | ICD-10-CM

## 2019-11-25 PROCEDURE — 3008F PR BODY MASS INDEX (BMI) DOCUMENTED: ICD-10-PCS | Mod: CPTII,S$GLB,, | Performed by: PHYSICIAN ASSISTANT

## 2019-11-25 PROCEDURE — 99999 PR PBB SHADOW E&M-EST. PATIENT-LVL III: CPT | Mod: PBBFAC,,, | Performed by: PHYSICIAN ASSISTANT

## 2019-11-25 PROCEDURE — 99214 PR OFFICE/OUTPT VISIT, EST, LEVL IV, 30-39 MIN: ICD-10-PCS | Mod: S$GLB,,, | Performed by: PHYSICIAN ASSISTANT

## 2019-11-25 PROCEDURE — 3008F BODY MASS INDEX DOCD: CPT | Mod: CPTII,S$GLB,, | Performed by: PHYSICIAN ASSISTANT

## 2019-11-25 PROCEDURE — 99999 PR PBB SHADOW E&M-EST. PATIENT-LVL III: ICD-10-PCS | Mod: PBBFAC,,, | Performed by: PHYSICIAN ASSISTANT

## 2019-11-25 PROCEDURE — 99214 OFFICE O/P EST MOD 30 MIN: CPT | Mod: S$GLB,,, | Performed by: PHYSICIAN ASSISTANT

## 2019-11-25 RX ORDER — TRETINOIN 0.25 MG/G
CREAM TOPICAL
Qty: 20 G | Refills: 1 | Status: SHIPPED | OUTPATIENT
Start: 2019-11-25 | End: 2019-11-25 | Stop reason: SDUPTHER

## 2019-11-25 RX ORDER — TRETINOIN 0.25 MG/G
CREAM TOPICAL
Qty: 20 G | Refills: 1 | Status: SHIPPED | OUTPATIENT
Start: 2019-11-25 | End: 2021-04-01

## 2019-11-25 NOTE — TELEPHONE ENCOUNTER
Called pt to give her [facility] pharmacy information. she was okay with the price.    TB    ----- Message from Nory Morris PA-C sent at 11/25/2019 11:02 AM CST -----  Contact: Patient  Please let pt know I'm going to send to [facility] pharmacy. It will be about $40 if she is willing to pay that. They will call to confirm address and get payment info. If she does not want to pay that much, she can try OTC differin gel instead. Thanks    ----- Message -----  From: Luz Marina Liriano MA  Sent: 11/25/2019  10:41 AM CST  To: Nory Morris PA-C        ----- Message -----  From: Darlyn Gilbert  Sent: 11/25/2019   9:22 AM CST  To: [facility] Staff    Need Advice:      Reason For Call: Medicine that was prescribed for face isn't covered by insurance.      Communication Preference: [phone]      Additional Information:

## 2019-11-25 NOTE — PATIENT INSTRUCTIONS
Recommend over the counter rogaine (minoxidil) 5% once nightly to scalp. Wash hands after use.    Discussed with patient that there are multiple over the counter hair supplements, few of which have proven efficacy in controlled clinical trials. However, anecdotal reports have indicated a benefit for these vitamins.  Handout reviewing active ingredients, allergies, and proper use were provided for Nutrafol, Viviscal, AG Pro, and biotin. The patient can elect to take at his/her discretion.    RETINOIDS           Your doctor has prescribed a topical retinoid for your skin. A retinoid is a vitamin A derived product used to treat a variety of skin conditions including acne, actinic keratoses (pre-skin cancers), uneven pigmentation from sun damage, fine lines and wrinkles, and enlarged pores.    How do they work?         Retinoids increase skin cell turn over from the normal 30 days to five or six days, minimizing clogged pores-the major factor in acne. Retinoids can also repair the DNA in cells damaged by the sun helping to even out skin pigmentation and clear pre-skin cancers. They can shrink oil glands and minimize the appearance of large pores. These effects can not be appreciated unless the medication is used on a consistent basis!    How do I use a retinoid?         After washing with a mild cleanser (Purpose, Aqua glycolic face cleanser, Cetaphil, Neutrogena deep cream cleanser), the skin should be moisturized with a non-retinol containing moisturizer such as Cerave PM. Then a thin layer of medication is applied to the forehead, nose cheeks, and chin (and around eyes if treating fine lines and wrinkles) at night. The amount of medication needed to cover the entire face should be no more than the size of a green pea. Irritation around the eyes can be treated with Vaseline at night.    What if my skin appears dry, red, and is peeling?          Retinoids do not cause dry skin but rather they cause the top layer of the  skin the shed, giving an appearance of dry skin. In fact, new healthy skin cells are replacing older, damaged cells on the surface. This usually occurs the first 2-4 weeks as the skin is adjusting to the medication. It is reasonable to use the medication every other night or even every 2 nights until your skin adjusts. You can use a MILD exfoliant to remove the peeling skin (Aveeno daily clarifying pads, Aqua glycolic face cream) and can apply a moisturizer throughout the day as needed. Retinoids come in a variety of strengths and vehicles and your doctor can find one best for you. If you cannot tolerate prescription strength retinoids, over the counter products with retinol may be beneficial. (Olay ProX wrinkle cream, MITCHELL deep wrinkle cream, Green Cream at [place])    Will my skin be more sensitive in the sun?           You will need to use sunscreen with SPF 30 daily. Retinoids will cause the outermost layer of the skin to be thinner and thus more sensitive to ultraviolet rays. However, remember that over time, retinoids actually make the skin thicker by enhancing collagen deposition which protects the skin from sun damage.    When will I see results?            If you are using a retinoid for acne, you should see improvement in 6-8 weeks. Do not be alarmed if you find that your acne gets worse before it gets better-KEEP USING THE MEDICATION- this is a normal response and your acne will improve if you can stick with it.           If you are using the medication for anti-aging and skin dyspigmentation, you may see results in 3 months, but most effects are not visible until 6 months. Retinoids are clinically proven to reverse signs of aging, but only if used on a CONSTISTENT BASIS!             Remember that retinoids should not be used if you are pregnant.           Discontinue use 1 week prior to waxing, as skin is more likely to tear.

## 2019-11-25 NOTE — PROGRESS NOTES
Subjective:       Patient ID:  Rika June is a 54 y.o. female who presents for   Chief Complaint   Patient presents with    Acne     face    Hair/Scalp Problem     scalp     Acne  - Initial  Affected locations: face and chin  Duration: yrs but flared recently.  Signs / symptoms: tender (occasionally)  Aggravated by: nothing  Treatments tried: has used tret in the past with good results; washes bid with Ivory soap, occ uses OTC BPO gel.  Improvement on treatment: mild    Hair/Scalp Problem  - Initial  Affected locations: scalp  Duration: 3 months  Signs and Symptoms: thinning area in pos scalp that appeared after mariano were removed.  Relieving factors/Treatments tried: nothing    Washes hair qwk.  Hx of relaxers/ perms but was natural for 12 yrs - had one perm 7-8 months ago.    Review of Systems   Skin: Negative for daily sunscreen use, activity-related sunscreen use and recent sunburn.   Hematologic/Lymphatic: Does not bruise/bleed easily.        Objective:    Physical Exam   Constitutional: She appears well-developed and well-nourished. No distress.   Neurological: She is alert and oriented to person, place, and time. She is not disoriented.   Psychiatric: She has a normal mood and affect.   Skin:   Areas Examined (abnormalities noted in diagram):   Scalp / Hair Palpated and Inspected  Head / Face Inspection Performed  Neck Inspection Performed              Diagram Legend     Erythematous scaling macule/papule c/w actinic keratosis       Vascular papule c/w angioma      Pigmented verrucoid papule/plaque c/w seborrheic keratosis      Yellow umbilicated papule c/w sebaceous hyperplasia      Irregularly shaped tan macule c/w lentigo     1-2 mm smooth white papules consistent with Milia      Movable subcutaneous cyst with punctum c/w epidermal inclusion cyst      Subcutaneous movable cyst c/w pilar cyst      Firm pink to brown papule c/w dermatofibroma      Pedunculated fleshy papule(s) c/w skin tag(s)       Evenly pigmented macule c/w junctional nevus     Mildly variegated pigmented, slightly irregular-bordered macule c/w mildly atypical nevus      Flesh colored to evenly pigmented papule c/w intradermal nevus       Pink pearly papule/plaque c/w basal cell carcinoma      Erythematous hyperkeratotic cursted plaque c/w SCC      Surgical scar with no sign of skin cancer recurrence      Open and closed comedones      Inflammatory papules and pustules      Verrucoid papule consistent consistent with wart     Erythematous eczematous patches and plaques     Dystrophic onycholytic nail with subungual debris c/w onychomycosis     Umbilicated papule    Erythematous-base heme-crusted tan verrucoid plaque consistent with inflamed seborrheic keratosis     Erythematous Silvery Scaling Plaque c/w Psoriasis     See annotation      Assessment / Plan:      Acne vulgaris - NP  -     tretinoin (RETIN-A) 0.025 % cream; Apply a green pea-sized amount to face qhs.  Dispense: 20 g; Refill: 1    Discussed benefits and risks of topical retinoid - brochure provided.    Hair loss disorder (most c/w traction alopecia) - NP  Discussed with patient that there are multiple over the counter hair supplements, few of which have proven efficacy in controlled clinical trials. However, anecdotal reports have indicated a benefit for these vitamins.  Handout reviewing active ingredients, allergies, and proper use were provided for Nutrafol, Viviscal, AG Pro, and biotin. The patient can elect to take at his/her discretion.    Recommend over the counter rogaine (minoxidil) 5% once nightly to scalp. Wash hands after use.    Offered ILK today, but pt declined. She will schedule in a few wks.         Follow up for ILK at pt's convenience.

## 2019-12-02 ENCOUNTER — PATIENT OUTREACH (OUTPATIENT)
Dept: ADMINISTRATIVE | Facility: OTHER | Age: 54
End: 2019-12-02

## 2019-12-02 ENCOUNTER — PATIENT MESSAGE (OUTPATIENT)
Dept: ADMINISTRATIVE | Facility: OTHER | Age: 54
End: 2019-12-02

## 2019-12-02 NOTE — PROGRESS NOTES
Left voicemail urging pt to return fitkit dispensed. Pt is also active on portal so a portal message was sent.

## 2019-12-04 ENCOUNTER — DOCUMENTATION ONLY (OUTPATIENT)
Dept: DERMATOLOGY | Facility: CLINIC | Age: 54
End: 2019-12-04

## 2019-12-08 ENCOUNTER — PATIENT OUTREACH (OUTPATIENT)
Dept: ADMINISTRATIVE | Facility: OTHER | Age: 54
End: 2019-12-08

## 2019-12-20 ENCOUNTER — PATIENT OUTREACH (OUTPATIENT)
Dept: ADMINISTRATIVE | Facility: HOSPITAL | Age: 54
End: 2019-12-20

## 2019-12-30 ENCOUNTER — PATIENT OUTREACH (OUTPATIENT)
Dept: ADMINISTRATIVE | Facility: HOSPITAL | Age: 54
End: 2019-12-30

## 2020-03-09 ENCOUNTER — PATIENT OUTREACH (OUTPATIENT)
Dept: ADMINISTRATIVE | Facility: OTHER | Age: 55
End: 2020-03-09

## 2020-03-09 NOTE — PROGRESS NOTES
Care Everywhere: updated  Immunization: updated  Health Maintenance: updated  Media Review: reviewed for possible outside colon cancer and mammogram report  Legacy Review: n/a  Order placed: n/a  Upcoming appts:n/a

## 2020-03-17 ENCOUNTER — PATIENT OUTREACH (OUTPATIENT)
Dept: ADMINISTRATIVE | Facility: HOSPITAL | Age: 55
End: 2020-03-17

## 2020-03-25 ENCOUNTER — OFFICE VISIT (OUTPATIENT)
Dept: FAMILY MEDICINE | Facility: CLINIC | Age: 55
End: 2020-03-25
Payer: COMMERCIAL

## 2020-03-25 VITALS
HEIGHT: 59 IN | BODY MASS INDEX: 31.51 KG/M2 | HEART RATE: 78 BPM | DIASTOLIC BLOOD PRESSURE: 80 MMHG | OXYGEN SATURATION: 98 % | SYSTOLIC BLOOD PRESSURE: 138 MMHG | TEMPERATURE: 98 F | WEIGHT: 156.31 LBS

## 2020-03-25 DIAGNOSIS — J45.20 MILD INTERMITTENT ASTHMA WITHOUT COMPLICATION: Primary | ICD-10-CM

## 2020-03-25 DIAGNOSIS — Z71.89 ADVICE GIVEN ABOUT 2019 NOVEL CORONAVIRUS INFECTION: ICD-10-CM

## 2020-03-25 DIAGNOSIS — J30.9 ALLERGIC RHINITIS, UNSPECIFIED SEASONALITY, UNSPECIFIED TRIGGER: ICD-10-CM

## 2020-03-25 DIAGNOSIS — I10 ESSENTIAL HYPERTENSION: ICD-10-CM

## 2020-03-25 PROCEDURE — 99999 PR PBB SHADOW E&M-EST. PATIENT-LVL III: CPT | Mod: PBBFAC,,, | Performed by: FAMILY MEDICINE

## 2020-03-25 PROCEDURE — 99214 OFFICE O/P EST MOD 30 MIN: CPT | Mod: S$GLB,,, | Performed by: FAMILY MEDICINE

## 2020-03-25 PROCEDURE — 3079F PR MOST RECENT DIASTOLIC BLOOD PRESSURE 80-89 MM HG: ICD-10-PCS | Mod: CPTII,S$GLB,, | Performed by: FAMILY MEDICINE

## 2020-03-25 PROCEDURE — 3075F PR MOST RECENT SYSTOLIC BLOOD PRESS GE 130-139MM HG: ICD-10-PCS | Mod: CPTII,S$GLB,, | Performed by: FAMILY MEDICINE

## 2020-03-25 PROCEDURE — 99999 PR PBB SHADOW E&M-EST. PATIENT-LVL III: ICD-10-PCS | Mod: PBBFAC,,, | Performed by: FAMILY MEDICINE

## 2020-03-25 PROCEDURE — 3075F SYST BP GE 130 - 139MM HG: CPT | Mod: CPTII,S$GLB,, | Performed by: FAMILY MEDICINE

## 2020-03-25 PROCEDURE — 3079F DIAST BP 80-89 MM HG: CPT | Mod: CPTII,S$GLB,, | Performed by: FAMILY MEDICINE

## 2020-03-25 PROCEDURE — 3008F BODY MASS INDEX DOCD: CPT | Mod: CPTII,S$GLB,, | Performed by: FAMILY MEDICINE

## 2020-03-25 PROCEDURE — 3008F PR BODY MASS INDEX (BMI) DOCUMENTED: ICD-10-PCS | Mod: CPTII,S$GLB,, | Performed by: FAMILY MEDICINE

## 2020-03-25 PROCEDURE — 99214 PR OFFICE/OUTPT VISIT, EST, LEVL IV, 30-39 MIN: ICD-10-PCS | Mod: S$GLB,,, | Performed by: FAMILY MEDICINE

## 2020-03-25 RX ORDER — TRAMADOL HYDROCHLORIDE 50 MG/1
TABLET ORAL
COMMUNITY
Start: 2020-01-10 | End: 2023-10-31

## 2020-03-25 RX ORDER — FLUTICASONE PROPIONATE 50 MCG
1 SPRAY, SUSPENSION (ML) NASAL DAILY
Qty: 16 G | Refills: 0 | Status: SHIPPED | OUTPATIENT
Start: 2020-03-25 | End: 2020-04-22

## 2020-03-25 RX ORDER — LEVOCETIRIZINE DIHYDROCHLORIDE 5 MG/1
5 TABLET, FILM COATED ORAL NIGHTLY
Qty: 30 TABLET | Refills: 2 | Status: SHIPPED | OUTPATIENT
Start: 2020-03-25 | End: 2021-04-01

## 2020-03-25 NOTE — PATIENT INSTRUCTIONS
"As noted by the Centers for Disease Control (CDC) and [facility] Department, it is not recommended to check patients who do not have symptoms.  As you have likely heard through the news, right now, we do not have enough testing supplies across the entire country; so, that is why we do not recommend checking low-risk persons for the Novel Coronavirus (commonly being called COVID-19) right now.  We are currently using the available testing supplies for people that have fever (temperature higher than 100.3F) AND symptoms of a respiratory infection AND are high risk for complications (patients with low immune systems, nursing home residents, pregnant women, infants under the age of 10 weeks old, etc).    At this time, I would recommend that you check your temperature twice a day and let us know if you develop fever of 100.4F or higher and other symptoms of a respiratory infection (cough, shortness of breath, body aches, etc).  It is also important to continue hand hygiene, cough hygiene, and social distancing by staying at least 6 feet away from other people whenever possible. This will be an important part of "flattening the curve!"      If you develop fever and/or more minor symptoms, you can contact us by calling the [facility] Coronavirus hotline at 1-935.667.7457 or the [place] Department of Health info center at 211.  There is also a lot of FAQs (Frequently Asked Questions) located at [email or web address] if you want to avoid waiting to speak to someone on the telephone.  Another option is to reach out to us directly via your patient portal, and we will do our best to get back to you in a timely manner.     If you develop more severe cough or shortness of breath symptoms, then you should go to the emergency room.      Rest assured that we are staying up-to-date with the latest recommendations from the CDC, the [place] [facility] Department, and the [facility] Departments about this rapidly changing " situation.  You can receive updates from the [facility] Department by texting LACOVID to [phone].    Please feel free to contact me with any questions or concerns.    Sincerely,  Elizabeth Santo

## 2020-03-25 NOTE — PROGRESS NOTES
"Chief Complaint   Patient presents with    Bronchitis       HPI  Rika June is a 54 y.o. female with multiple medical diagnoses as listed in the medical history and problem list that presents for evaluation for cough for two days    Cough  Has occurred for two days  No fever or trouble breathing  Cough is dry  She has had itching and watery eyes but no sinus pain or pressure  She has taken an allergy pill  No nasal drip      Patient Active Problem List   Diagnosis    Asthma with exacerbation    Essential hypertension    Thyroid nodule    Goiter, unspecified    Vitamin D deficiency    Mild intermittent asthma without complication         ROS  Review of Systems   Constitutional: Negative for chills, diaphoresis, fatigue, fever and unexpected weight change.   HENT: Negative for rhinorrhea, sinus pressure, sore throat and tinnitus.    Eyes: Negative for photophobia and visual disturbance.   Respiratory: Positive for cough. Negative for shortness of breath and wheezing.    Cardiovascular: Negative for chest pain and palpitations.   Gastrointestinal: Negative for abdominal pain, blood in stool, constipation, diarrhea, nausea and vomiting.   Genitourinary: Negative for dysuria, flank pain, frequency and vaginal discharge.   Musculoskeletal: Negative for arthralgias and joint swelling.   Skin: Negative for rash.   Neurological: Negative for speech difficulty, weakness, light-headedness and headaches.   Psychiatric/Behavioral: Negative for behavioral problems and dysphoric mood.       Physical Exam  Vitals:    03/25/20 0926 03/25/20 1019   BP: (!) 144/84 138/80   BP Location: Right arm Right arm   Patient Position: Sitting Sitting   BP Method: Large (Manual) Large (Manual)   Pulse: 78    Temp: 98.2 °F (36.8 °C)    TempSrc: Oral    SpO2: 98%    Weight: 70.9 kg (156 lb 4.9 oz)    Height: 4' 11" (1.499 m)     Body mass index is 31.57 kg/m².  Weight: 70.9 kg (156 lb 4.9 oz)   Height: 4' 11" (149.9 cm)     Physical " Exam   Constitutional: She is oriented to person, place, and time. She appears well-developed and well-nourished. No distress.   HENT:   Head: Normocephalic and atraumatic.   Moderate nasal turbinate enlargement   Eyes: EOM are normal.   Neck: Neck supple.   Cardiovascular: Normal rate and regular rhythm. Exam reveals no gallop and no friction rub.   No murmur heard.  Pulmonary/Chest: Effort normal and breath sounds normal. No respiratory distress. She has no wheezes. She has no rales.   Lymphadenopathy:     She has no cervical adenopathy.   Neurological: She is alert and oriented to person, place, and time.   Skin: Skin is warm and dry. No rash noted.   Psychiatric: She has a normal mood and affect. Her behavior is normal.   Nursing note and vitals reviewed.      ALLERGIES AND MEDICATIONS: updated and reviewed.  Review of patient's allergies indicates:   Allergen Reactions    Hydrocodone-acetaminophen Nausea Only and Nausea And Vomiting     Other reaction(s): Nausea     Medication List with Changes/Refills   New Medications    FLUTICASONE PROPIONATE (FLONASE) 50 MCG/ACTUATION NASAL SPRAY    1 spray (50 mcg total) by Each Nostril route once daily.    LEVOCETIRIZINE (XYZAL) 5 MG TABLET    Take 1 tablet (5 mg total) by mouth every evening.   Current Medications    ALBUTEROL (PROVENTIL) 2.5 MG /3 ML (0.083 %) NEBULIZER SOLUTION    Take 3 mLs (2.5 mg total) by nebulization every 6 (six) hours as needed.    TRAMADOL (ULTRAM) 50 MG TABLET    TK 1 T PO Q 6 H PRF PAIN    TRETINOIN (RETIN-A) 0.025 % CREAM    Apply a green pea-sized amount to face qhs.   Discontinued Medications    NYSTATIN (MYCOSTATIN) POWDER    Apply to affected area 3 times daily    PREDNISONE (DELTASONE) 10 MG TABLET    2 tab po qday x 4 days, then 1 tab po qday x 4 days, then 1/2 tab po qday x 4 days       Assessment & Plan  1. Mild intermittent asthma without complication    2. Essential hypertension    3. Allergic rhinitis, unspecified seasonality,  unspecified trigger    4. Advice Given About 2019 Novel Coronavirus Infection        Problem List Items Addressed This Visit        Pulmonary    Mild intermittent asthma without complication - Primary  -stable with no wheezing or flare       Cardiac/Vascular    Essential hypertension  -controlled      Other Visit Diagnoses     Allergic rhinitis, unspecified seasonality, unspecified trigger      -add antihistamine and nasal spray  -no signs of acute infection    Relevant Medications    levocetirizine (XYZAL) 5 MG tablet    fluticasone propionate (FLONASE) 50 mcg/actuation nasal spray    Advice Given About 2019 Novel Coronavirus Infection      -we discussed temperature monitoring daily and she was advised to call us back if fever develops  -continue to practice social distancing          Follow up if symptoms worsen or fail to improve.    Other Orders Placed This Visit:  No orders of the defined types were placed in this encounter.

## 2020-04-03 ENCOUNTER — TELEPHONE (OUTPATIENT)
Dept: FAMILY MEDICINE | Facility: CLINIC | Age: 55
End: 2020-04-03

## 2020-04-03 NOTE — TELEPHONE ENCOUNTER
If she is having asthma symptoms and thinks she may need steroids, please schedule virtual visit so we can discuss in more detail, we can also go over the note at that time    Elizabeth Santo MD

## 2020-04-03 NOTE — TELEPHONE ENCOUNTER
----- Message from Elsa Cevallos sent at 4/2/2020  4:19 PM CDT -----  Contact: Self [phone]  Type: Patient Call Back    Who called:Self    What is the request in detail: pt need a letter stating that she is high risk due to COVID-19 for her job and the patient is also requesting a steroid pack to be sent to her pharmacy. Pt pharmacy is   [id number]  [place], [place] - [facility] [facility] [address]  [facility] [place] [address]  Phone: [phone] Fax: [phone]    Can the clinic reply by [facility]? Call back    Would the patient rather a call back or a response via [facility]? Call back    Best call back number: [phone]

## 2020-04-03 NOTE — TELEPHONE ENCOUNTER
Patient states she needs a letter stating she is at high risk to COVID-19 r/t asthma and bronchitis. Please advise

## 2020-04-06 ENCOUNTER — PATIENT MESSAGE (OUTPATIENT)
Dept: FAMILY MEDICINE | Facility: CLINIC | Age: 55
End: 2020-04-06

## 2020-04-07 ENCOUNTER — OFFICE VISIT (OUTPATIENT)
Dept: FAMILY MEDICINE | Facility: CLINIC | Age: 55
End: 2020-04-07
Payer: COMMERCIAL

## 2020-04-07 DIAGNOSIS — Z71.89 ADVICE GIVEN ABOUT COVID-19 VIRUS INFECTION: ICD-10-CM

## 2020-04-07 DIAGNOSIS — J45.20 MILD INTERMITTENT ASTHMA WITHOUT COMPLICATION: Primary | ICD-10-CM

## 2020-04-07 PROCEDURE — 99213 PR OFFICE/OUTPT VISIT, EST, LEVL III, 20-29 MIN: ICD-10-PCS | Mod: 95,,, | Performed by: FAMILY MEDICINE

## 2020-04-07 PROCEDURE — 99213 OFFICE O/P EST LOW 20 MIN: CPT | Mod: 95,,, | Performed by: FAMILY MEDICINE

## 2020-04-07 NOTE — TELEPHONE ENCOUNTER
Please reschedule patient and give her number for tech support if you cannot walk her through the visit    Elizabeth Santo MD

## 2020-04-07 NOTE — PROGRESS NOTES
Chief Complaint   Patient presents with    COVID-19 Concerns    Asthma       HPI  Rika June is a 54 y.o. female with multiple medical diagnoses as listed in the medical history and problem list that presents for evaluation of asthma and to discuss concerned about COVID 19    She would like a letter in her chart that states she is in  A higher risk class due to asthma. 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 She has not talked to HR yet about if she can be moved to another area but thinks this is unlikely    She was treated for an asthma flare on the end of March but symptoms have resolved    PAST MEDICAL HISTORY:  Past Medical History:   Diagnosis Date    Allergic rhinitis     Allergy     Asthma        PAST SURGICAL HISTORY:  Past Surgical History:   Procedure Laterality Date     SECTION      TUBAL LIGATION         SOCIAL HISTORY:  Social History     Socioeconomic History    Marital status: Legally      Spouse name: Not on file    Number of children: Not on file    Years of education: Not on file    Highest education level: Not on file   Occupational History    Not on file   Social Needs    Financial resource strain: Not on file    Food insecurity:     Worry: Not on file     Inability: Not on file    Transportation needs:     Medical: Not on file     Non-medical: Not on file   Tobacco Use    Smoking status: Never Smoker    Smokeless tobacco: Never Used   Substance and Sexual Activity    Alcohol use: No    Drug use: No    Sexual activity: Yes     Partners: Male   Lifestyle    Physical activity:     Days per week: Not on file     Minutes per session: Not on file    Stress: Not on file   Relationships    Social connections:     Talks on phone: Not on file     Gets together: Not on file     Attends Jewish service: Not on file     Active member of club or organization: Not on file     Attends meetings of clubs or organizations: Not on file      Relationship status: Not on file   Other Topics Concern    Are you pregnant or think you may be? No    Breast-feeding No   Social History Narrative    Not on file       FAMILY HISTORY:  Family History   Problem Relation Age of Onset    Breast cancer Mother     Cancer Father         throat    Diabetes Maternal Grandmother     Ovarian cancer Neg Hx     Stroke Neg Hx     Hypertension Neg Hx     Melanoma Neg Hx        ALLERGIES AND MEDICATIONS: updated and reviewed.  Review of patient's allergies indicates:   Allergen Reactions    Hydrocodone-acetaminophen Nausea Only and Nausea And Vomiting     Other reaction(s): Nausea     Current Outpatient Medications   Medication Sig Dispense Refill    albuterol (PROVENTIL) 2.5 mg /3 mL (0.083 %) nebulizer solution Take 3 mLs (2.5 mg total) by nebulization every 6 (six) hours as needed. 1 Box 0    fluticasone propionate (FLONASE) 50 mcg/actuation nasal spray 1 spray (50 mcg total) by Each Nostril route once daily. 16 g 0    levocetirizine (XYZAL) 5 MG tablet Take 1 tablet (5 mg total) by mouth every evening. 30 tablet 2    traMADoL (ULTRAM) 50 mg tablet TK 1 T PO Q 6 H PRF PAIN      tretinoin (RETIN-A) 0.025 % cream Apply a green pea-sized amount to face qhs. 20 g 1     No current facility-administered medications for this visit.        ROS  Review of Systems   Constitutional: Negative for chills, diaphoresis, fatigue, fever and unexpected weight change.   HENT: Negative for rhinorrhea, sinus pressure, sore throat and tinnitus.    Eyes: Negative for photophobia and visual disturbance.   Respiratory: Negative for cough, shortness of breath and wheezing.    Cardiovascular: Negative for chest pain and palpitations.   Gastrointestinal: Negative for abdominal pain, blood in stool, constipation, diarrhea, nausea and vomiting.   Genitourinary: Negative for dysuria, flank pain, frequency and vaginal discharge.   Musculoskeletal: Negative for arthralgias and joint swelling.    Skin: Negative for rash.   Neurological: Negative for speech difficulty, weakness, light-headedness and headaches.   Psychiatric/Behavioral: Negative for behavioral problems and dysphoric mood.       Physical Exam              Physical Exam   Constitutional: She is oriented to person, place, and time. She appears well-developed and well-nourished.   Eyes: EOM are normal.   Neurological: She is alert and oriented to person, place, and time.   Skin: Skin is warm and dry. No rash noted. No erythema.   Psychiatric: She has a normal mood and affect. Her behavior is normal.   Nursing note and vitals reviewed.      Health Maintenance       Date Due Completion Date    HIV Screening 08/15/1978 ---    TETANUS VACCINE 08/15/1981 ---    Mammogram 08/15/2003 ---    Shingles Vaccine (1 of 2) 08/15/2013 ---    Colonoscopy 08/15/2013 ---    Pap Smear with HPV Cotest 09/16/2022 9/16/2019    Lipid Panel 06/03/2024 6/3/2019          Health maintenance reviewed and addressed as ordered      ASSESSMENT     1. Mild intermittent asthma without complication    2. Advice Given About Covid-19 Virus Infection        PLAN:     Problem List Items Addressed This Visit        Pulmonary    Mild intermittent asthma without complication - Primary  -flare up resolved continue inhaler use prn  -advise against steroids w/o symptoms due to reduced immunity      Other Visit Diagnoses     Advice Given About Covid-19 Virus Infection      -we discussed social distancing and hygiene  -with her permission I have given a letter to her job stating her risk due to asthma  -she has been advised to speak with HR about any accommodations that can be made            Elizabeth Santo MD  04/07/2020 2:30 PM        Follow up if symptoms worsen or fail to improve.              The patient location is:  Patient Home   The chief complaint leading to consultation is: asthma  Visit type: Virtual visit with synchronous audio and video  Total time spent with patient: 15  min  Each patient to whom he or she provides medical services by telemedicine is:  (1) informed of the relationship between the physician and patient and the respective role of any other health care provider with respect to management of the patient; and (2) notified that he or she may decline to receive medical services by telemedicine and may withdraw from such care at any time.

## 2020-04-21 DIAGNOSIS — J30.9 ALLERGIC RHINITIS, UNSPECIFIED SEASONALITY, UNSPECIFIED TRIGGER: ICD-10-CM

## 2020-04-22 RX ORDER — FLUTICASONE PROPIONATE 50 MCG
SPRAY, SUSPENSION (ML) NASAL
Qty: 16 G | Refills: 5 | Status: SHIPPED | OUTPATIENT
Start: 2020-04-22

## 2020-06-05 DIAGNOSIS — I10 ESSENTIAL HYPERTENSION: ICD-10-CM

## 2020-06-08 ENCOUNTER — PATIENT OUTREACH (OUTPATIENT)
Dept: ADMINISTRATIVE | Facility: HOSPITAL | Age: 55
End: 2020-06-08

## 2020-07-24 DIAGNOSIS — Z12.11 COLON CANCER SCREENING: ICD-10-CM

## 2020-10-05 ENCOUNTER — PATIENT MESSAGE (OUTPATIENT)
Dept: ADMINISTRATIVE | Facility: HOSPITAL | Age: 55
End: 2020-10-05

## 2020-10-29 ENCOUNTER — OFFICE VISIT (OUTPATIENT)
Dept: URGENT CARE | Facility: CLINIC | Age: 55
End: 2020-10-29
Payer: COMMERCIAL

## 2020-10-29 VITALS
DIASTOLIC BLOOD PRESSURE: 81 MMHG | OXYGEN SATURATION: 98 % | TEMPERATURE: 99 F | BODY MASS INDEX: 31.45 KG/M2 | RESPIRATION RATE: 18 BRPM | HEART RATE: 89 BPM | HEIGHT: 59 IN | WEIGHT: 156 LBS | SYSTOLIC BLOOD PRESSURE: 158 MMHG
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
## 2020-10-29 DIAGNOSIS — S93.601A SPRAIN OF RIGHT FOOT, INITIAL ENCOUNTER: ICD-10-CM

## 2020-10-29 DIAGNOSIS — S93.401A SPRAIN OF RIGHT ANKLE, UNSPECIFIED LIGAMENT, INITIAL ENCOUNTER: Primary | ICD-10-CM

## 2020-10-29 PROCEDURE — 99214 OFFICE O/P EST MOD 30 MIN: CPT | Mod: S$GLB,,, | Performed by: PHYSICIAN ASSISTANT

## 2020-10-29 PROCEDURE — 99214 PR OFFICE/OUTPT VISIT, EST, LEVL IV, 30-39 MIN: ICD-10-PCS | Mod: S$GLB,,, | Performed by: PHYSICIAN ASSISTANT

## 2020-10-29 PROCEDURE — 73610 X-RAY EXAM OF ANKLE: CPT | Mod: RT,S$GLB,, | Performed by: RADIOLOGY

## 2020-10-29 PROCEDURE — 73630 XR FOOT COMPLETE 3 VIEW RIGHT: ICD-10-PCS | Mod: RT,S$GLB,, | Performed by: RADIOLOGY

## 2020-10-29 PROCEDURE — 73610 XR ANKLE COMPLETE 3 VIEW RIGHT: ICD-10-PCS | Mod: RT,S$GLB,, | Performed by: RADIOLOGY

## 2020-10-29 PROCEDURE — 73630 X-RAY EXAM OF FOOT: CPT | Mod: RT,S$GLB,, | Performed by: RADIOLOGY

## 2020-10-29 RX ORDER — IBUPROFEN 600 MG/1
600 TABLET ORAL EVERY 6 HOURS PRN
Qty: 30 TABLET | Refills: 1 | Status: SHIPPED | OUTPATIENT
Start: 2020-10-29 | End: 2020-11-28

## 2020-10-29 NOTE — LETTER
[facility] [place] [address] [place] [address]  Phone: [phone]  Fax: [phone]  [facility]: [phone]    Pt Name: Rika June  Injury Date: 10/29/2020   Employee ID:  Date of Treatment: 10/29/2020   Company: [employer]      Appointment Time: 11:50 AM Arrived: 11:50 AM   Provider: Katie Murray PA-C Time Out:1:12 PM     Office Treatment:   1. Sprain of right ankle, unspecified ligament, initial encounter    2. Work related injury    3. Sprain of right foot, initial encounter      Medications Ordered This Encounter   Medications    ibuprofen (ADVIL,MOTRIN) 600 MG tablet      Patient Instructions: Apply ice 24-48 hours then apply heat/warm soaks, Elevated affected area, Use splint as directed, Use Crutches as directed    Restrictions: Disabled until next office visit     Return Appointment: 11/5/2020 at 9:00 AM

## 2020-10-29 NOTE — PROGRESS NOTES
"Subjective:       Patient ID: Rika June is a 55 y.o. female.    Vitals:  height is 4' 11" (1.499 m) and weight is 70.8 kg (156 lb).     Chief Complaint: Ankle Injury    Ankle Injury   The incident occurred 6 to 12 hours ago (6am this morning). The incident occurred at work (co-worker fell on her ankle ). The injury mechanism was a direct blow. The pain is present in the right ankle. The quality of the pain is described as aching. The pain is at a severity of 4/10. The pain is mild. She reports no foreign bodies present. The symptoms are aggravated by movement and weight bearing. She has tried nothing for the symptoms.       Constitution: Negative for chills, sweating, fatigue and fever.   HENT: Negative for ear pain, facial swelling, facial trauma, congestion, sinus pain, sinus pressure, sore throat and voice change.    Neck: Negative for neck stiffness and painful lymph nodes.   Cardiovascular: Negative for chest trauma.   Eyes: Negative for eye trauma, eye redness, double vision and blurred vision.   Respiratory: Negative for chest tightness, cough, sputum production, bloody sputum, COPD, shortness of breath, stridor, wheezing and asthma.    Gastrointestinal: Negative for abdominal trauma, abdominal pain, nausea, vomiting and rectal bleeding.   Genitourinary: Negative for hematuria, missed menses, genital trauma and pelvic pain.   Musculoskeletal: Positive for pain. Negative for trauma, joint swelling, abnormal ROM of joint and muscle ache.   Skin: Negative for color change, rash, wound, abrasion, laceration and bruising.   Allergic/Immunologic: Negative for seasonal allergies and asthma.   Neurological: Negative for dizziness, history of vertigo, light-headedness, coordination disturbances, altered mental status and loss of consciousness.   Hematologic/Lymphatic: Negative for swollen lymph nodes and history of bleeding disorder.   Psychiatric/Behavioral: Negative for altered mental status.       Objective: "      Physical Exam      Assessment:       No diagnosis found.    Plan:         There are no diagnoses linked to this encounter.

## 2020-10-29 NOTE — PROGRESS NOTES
Subjective:       Patient ID: Rika June is a 55 y.o. female.    Chief Complaint: Ankle Injury    Patient presents for initial worker's comp visit of an injury that occurred today 10/29/2020.  Patient states she was walking with another co-worker and this person fell.  She fell into the patient's right lower leg/ankle.  This occurred approximately 630 this morning.  Patient states that she finished her shift but had significant pain and swelling.  She took an ibuprofen when she got off at approximately 9:00 a.m. this morning.  She did try icing the area but states that it became more painful so she came in for evaluation.  She denies any significant previous injury to this foot or ankle.    Ankle Injury   The incident occurred 6 to 12 hours ago (at 6am this morning). The incident occurred at work. The injury mechanism was a direct blow (co worker fell on her RT ANKLE ). The pain is present in the right ankle. The quality of the pain is described as aching. The pain is at a severity of 5/10. The pain is moderate. The pain has been constant since onset. The symptoms are aggravated by movement and weight bearing. She has tried nothing for the symptoms.       Constitution: Negative for fatigue.   HENT: Negative for facial swelling and facial trauma.    Neck: Negative for neck stiffness.   Cardiovascular: Negative for chest trauma.   Eyes: Negative for eye trauma, double vision and blurred vision.   Gastrointestinal: Negative for abdominal trauma, abdominal pain and rectal bleeding.   Genitourinary: Negative for hematuria, missed menses, genital trauma and pelvic pain.   Musculoskeletal: Positive for pain, trauma, joint pain, joint swelling and abnormal ROM of joint.   Skin: Negative for color change, wound, abrasion, laceration, erythema and bruising.   Neurological: Negative for dizziness, history of vertigo, light-headedness, coordination disturbances, altered mental status and loss of consciousness.    Hematologic/Lymphatic: Negative for history of bleeding disorder.   Psychiatric/Behavioral: Negative for altered mental status.        Objective:      Physical Exam  Vitals signs and nursing note reviewed.   Constitutional:       Appearance: She is well-developed.   HENT:      Head: Normocephalic and atraumatic.   Eyes:      Conjunctiva/sclera: Conjunctivae normal.   Neck:      Musculoskeletal: Normal range of motion and neck supple.      Thyroid: No thyromegaly.   Cardiovascular:      Rate and Rhythm: Normal rate and regular rhythm.      Heart sounds: No murmur. No friction rub. No gallop.    Pulmonary:      Effort: Pulmonary effort is normal.      Breath sounds: Normal breath sounds. No wheezing or rales.   Musculoskeletal:      Right ankle: She exhibits decreased range of motion and swelling. She exhibits no deformity. Tenderness. Lateral malleolus, medial malleolus, AITFL and head of 5th metatarsal tenderness found. Achilles tendon exhibits pain. Achilles tendon exhibits no defect and normal Bee's test results.      Right lower leg: She exhibits tenderness.      Right foot: Decreased range of motion. Tenderness (generalized) and swelling present.      Comments: Right ankle:  Limited range of motion of the ankle and foot secondary to pain.   Lymphadenopathy:      Cervical: No cervical adenopathy.   Skin:     General: Skin is warm and dry.      Findings: No erythema or rash.   Neurological:      Mental Status: She is alert and oriented to person, place, and time.   Psychiatric:         Behavior: Behavior normal.         Thought Content: Thought content normal.         Judgment: Judgment normal.       Xr Ankle Complete 3 View Right    Result Date: 10/29/2020  EXAMINATION: XR ANKLE COMPLETE 3 VIEW RIGHT CLINICAL HISTORY: Pain, unspecified TECHNIQUE: AP, lateral, and oblique images of the right ankle were performed. COMPARISON: None FINDINGS: Three views right ankle. No acute displaced fracture or dislocation  of the ankle.  No radiopaque foreign body.  There is edema about the lower leg, particularly overlying the lateral malleolus.  Degenerative changes are noted of the calcaneus.     1. No acute displaced fracture or dislocation of the ankle noting nonspecific edema overlying the lateral malleolus. 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  No fracture dislocation bone destruction seen.  No trauma seen.  There are spurs on the calcaneus. Electronically signed by: Lucian Johnson MD Date:    10/29/2020 Time:    12:43    Assessment:       1. Sprain of right ankle, unspecified ligament, initial encounter    2. Work related injury    3. Sprain of right foot, initial encounter        Plan:       Patient was placed in an ankle brace and given crutches as she has difficulty walking secondary to pain.  Medications Ordered This Encounter   Medications    ibuprofen (ADVIL,MOTRIN) 600 MG tablet     Sig: Take 1 tablet (600 mg total) by mouth every 6 (six) hours as needed.     Dispense:  30 tablet     Refill:  1     Patient Instructions: Apply ice 24-48 hours then apply heat/warm soaks, Elevated affected area, Use splint as directed, Use Crutches as directed   Restrictions: Disabled until next office visit  Follow up in about 1 week (around 11/5/2020).

## 2020-10-30 ENCOUNTER — PATIENT MESSAGE (OUTPATIENT)
Dept: ADMINISTRATIVE | Facility: HOSPITAL | Age: 55
End: 2020-10-30

## 2020-11-05 ENCOUNTER — OFFICE VISIT (OUTPATIENT)
Dept: URGENT CARE | Facility: CLINIC | Age: 55
End: 2020-11-05
Payer: OTHER MISCELLANEOUS
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
## 2020-11-05 DIAGNOSIS — S93.401D SPRAIN OF RIGHT ANKLE, UNSPECIFIED LIGAMENT, SUBSEQUENT ENCOUNTER: Primary | ICD-10-CM

## 2020-11-05 DIAGNOSIS — S93.601D SPRAIN OF RIGHT FOOT, SUBSEQUENT ENCOUNTER: ICD-10-CM

## 2020-11-05 PROCEDURE — 99214 PR OFFICE/OUTPT VISIT, EST, LEVL IV, 30-39 MIN: ICD-10-PCS | Mod: S$GLB,,, | Performed by: PHYSICIAN ASSISTANT

## 2020-11-05 PROCEDURE — 99214 OFFICE O/P EST MOD 30 MIN: CPT | Mod: S$GLB,,, | Performed by: PHYSICIAN ASSISTANT

## 2020-11-05 NOTE — PROGRESS NOTES
Subjective:       Patient ID: Rika June is a 55 y.o. female.    Chief Complaint: Ankle Injury    10/29/2020  Pt states that her ankle is feeling.  She is still having pain but it is improved from last week. She rates her pain 7/10.  Her swelling has also improved.  She is wearing an Ace wrap when she thinks she will be ambulatory.  She is still using crutches.  She is taking ibuprofen 3 times a day as prescribed.  She is icing, elevating, and using heat to the area as instructed last week.    Ankle Injury   The incident occurred more than 1 week ago. The incident occurred at work. The pain is at a severity of 7/10. The pain has been intermittent since onset. Pertinent negatives include no inability to bear weight, loss of motion, loss of sensation, muscle weakness, numbness or tingling.       Constitution: Negative for chills, fatigue and fever.   HENT: Negative for congestion and sore throat.    Neck: Negative for painful lymph nodes.   Cardiovascular: Negative for chest pain and leg swelling.   Eyes: Negative for double vision and blurred vision.   Respiratory: Negative for cough and shortness of breath.    Gastrointestinal: Negative for nausea, vomiting and diarrhea.   Genitourinary: Negative for dysuria, frequency, urgency and history of kidney stones.   Musculoskeletal: Positive for pain, trauma, joint pain, joint swelling and abnormal ROM of joint. Negative for arthritis, gout, back pain, muscle cramps, muscle ache and history of spine disorder.   Skin: Negative for color change, pale, rash, erythema and bruising.   Allergic/Immunologic: Negative for seasonal allergies.   Neurological: Negative for dizziness, history of vertigo, light-headedness, passing out, headaches and numbness.   Hematologic/Lymphatic: Negative for swollen lymph nodes.   Psychiatric/Behavioral: Negative for nervous/anxious, sleep disturbance and depression. The patient is not nervous/anxious.         Objective:      Physical  Exam  Vitals signs and nursing note reviewed.   Constitutional:       Appearance: She is well-developed.   HENT:      Head: Normocephalic and atraumatic.   Eyes:      Conjunctiva/sclera: Conjunctivae normal.   Neck:      Musculoskeletal: Normal range of motion and neck supple.      Thyroid: No thyromegaly.   Cardiovascular:      Rate and Rhythm: Normal rate and regular rhythm.      Heart sounds: No murmur. No friction rub. No gallop.    Pulmonary:      Effort: Pulmonary effort is normal.      Breath sounds: Normal breath sounds. No wheezing or rales.   Musculoskeletal:      Right ankle: She exhibits decreased range of motion and swelling. She exhibits no deformity. Tenderness. Lateral malleolus, medial malleolus, AITFL and head of 5th metatarsal tenderness found. Achilles tendon exhibits pain. Achilles tendon exhibits no defect and normal Ebe's test results.      Right lower leg: She exhibits no tenderness.      Right foot: Decreased range of motion. Tenderness (generalized) and swelling present.      Comments: Right ankle:  Improved range of motion.  Improved swelling.   Lymphadenopathy:      Cervical: No cervical adenopathy.   Skin:     General: Skin is warm and dry.      Findings: No erythema or rash.   Neurological:      Mental Status: She is alert and oriented to person, place, and time.   Psychiatric:         Behavior: Behavior normal.         Thought Content: Thought content normal.         Judgment: Judgment normal.         Assessment:       1. Sprain of right ankle, unspecified ligament, subsequent encounter    2. Sprain of right foot, subsequent encounter    3. Work related injury        Plan:       Patient's pain and swelling are slightly improved.  She is wearing the Ace wrap and using crutches still.  I have set a goal this week for her to lessen her use of crutches.  By her next visit in 1 week she should be off the crutches completely.  She can continue to use the Ace wrap for comfort.  Patient  voiced her understanding of this and is in agreement with this plan.     Patient Instructions: Attention not to aggravate affected area, Daily home exercises/warm soaks, Apply ice 24-48 hours then apply heat/warm soaks, Elevated affected area, Use Crutches as directed   Restrictions: Disabled until next office visit  Follow up in about 1 week (around 11/12/2020).

## 2020-11-05 NOTE — LETTER
[facility] [place] [address] [address] [address]  Phone: [phone]  Fax: [phone]  [facility]: [id number]    Pt Name: Rika June  Injury Date: 07/15/2013   Employee ID: [id number] Date of Treatment: 11/05/2020   Company: [employer]      Appointment Time: 08:45 AM Arrived: 9:00 AM   Provider: Katie Murray PA-C Time Out:9:31 AM     Office Treatment:   1. Sprain of right ankle, unspecified ligament, subsequent encounter    2. Sprain of right foot, subsequent encounter    3. Work related injury          Patient Instructions: Attention not to aggravate affected area, Daily home exercises/warm soaks, Apply ice 24-48 hours then apply heat/warm soaks, Elevated affected area, Use Crutches as directed    Restrictions: Disabled until next office visit     Return Appointment: 11/12/2020 at 9:30 AM

## 2020-11-12 ENCOUNTER — OFFICE VISIT (OUTPATIENT)
Dept: URGENT CARE | Facility: CLINIC | Age: 55
End: 2020-11-12
Payer: COMMERCIAL
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
## 2020-11-12 DIAGNOSIS — S93.601D SPRAIN OF RIGHT FOOT, SUBSEQUENT ENCOUNTER: ICD-10-CM

## 2020-11-12 DIAGNOSIS — S93.401D SPRAIN OF RIGHT ANKLE, UNSPECIFIED LIGAMENT, SUBSEQUENT ENCOUNTER: Primary | ICD-10-CM

## 2020-11-12 PROCEDURE — 99214 PR OFFICE/OUTPT VISIT, EST, LEVL IV, 30-39 MIN: ICD-10-PCS | Mod: S$GLB,,, | Performed by: PHYSICIAN ASSISTANT

## 2020-11-12 PROCEDURE — 99214 OFFICE O/P EST MOD 30 MIN: CPT | Mod: S$GLB,,, | Performed by: PHYSICIAN ASSISTANT

## 2020-11-12 RX ORDER — IBUPROFEN 600 MG/1
600 TABLET ORAL EVERY 6 HOURS PRN
Qty: 30 TABLET | Refills: 1 | Status: SHIPPED | OUTPATIENT
Start: 2020-11-12 | End: 2020-12-12

## 2020-11-12 NOTE — PROGRESS NOTES
Subjective:       Patient ID: Rika June is a 55 y.o. female.    Chief Complaint: Ankle Injury    DOI 10/29/2020    Patient states that she feels her ankle and foot are improving.  Pt states that she is still in pain 4/10.  She does still have swelling to the ankle as well.  She has gotten off the crutches except for when she goes up and down her stairs.  She is continuing ice, elevation, and heat intermittently.  She does wrap her ankle with an Ace wrap when she is ambulatory.    Ankle Injury   The incident occurred more than 1 week ago. The incident occurred at work. The pain is at a severity of 4/10. The pain is mild. Pertinent negatives include no numbness.       Constitution: Negative for chills, fatigue and fever.   HENT: Negative for congestion and sore throat.    Neck: Negative for painful lymph nodes.   Cardiovascular: Negative for chest pain and leg swelling.   Eyes: Negative for double vision and blurred vision.   Respiratory: Negative for cough and shortness of breath.    Gastrointestinal: Negative for nausea, vomiting and diarrhea.   Genitourinary: Negative for dysuria, frequency, urgency and history of kidney stones.   Musculoskeletal: Positive for pain, trauma, joint pain, joint swelling and abnormal ROM of joint. Negative for arthritis, gout, back pain, muscle cramps, muscle ache and history of spine disorder.   Skin: Negative for color change, pale, rash, erythema and bruising.   Allergic/Immunologic: Negative for seasonal allergies.   Neurological: Negative for dizziness, history of vertigo, light-headedness, passing out, headaches and numbness.   Hematologic/Lymphatic: Negative for swollen lymph nodes.   Psychiatric/Behavioral: Negative for nervous/anxious, sleep disturbance and depression. The patient is not nervous/anxious.         Objective:      Physical Exam  Vitals signs and nursing note reviewed.   Constitutional:       Appearance: She is well-developed.   HENT:      Head:  Normocephalic and atraumatic.   Eyes:      Conjunctiva/sclera: Conjunctivae normal.   Neck:      Musculoskeletal: Normal range of motion and neck supple.      Thyroid: No thyromegaly.   Cardiovascular:      Rate and Rhythm: Normal rate and regular rhythm.      Heart sounds: No murmur. No friction rub. No gallop.    Pulmonary:      Effort: Pulmonary effort is normal.      Breath sounds: Normal breath sounds. No wheezing or rales.   Musculoskeletal:      Right ankle: She exhibits decreased range of motion and swelling. She exhibits no deformity. Tenderness. Lateral malleolus, medial malleolus, AITFL and head of 5th metatarsal tenderness found. Achilles tendon exhibits pain. Achilles tendon exhibits no defect and normal Bee's test results.      Right lower leg: She exhibits no tenderness.      Right foot: Decreased range of motion. Tenderness (generalized) and swelling present.      Comments: Right ankle:  Improved range of motion.  Improved swelling.   Lymphadenopathy:      Cervical: No cervical adenopathy.   Skin:     General: Skin is warm and dry.      Findings: No erythema or rash.   Neurological:      Mental Status: She is alert and oriented to person, place, and time.   Psychiatric:         Behavior: Behavior normal.         Thought Content: Thought content normal.         Judgment: Judgment normal.         Assessment:       1. Sprain of right ankle, unspecified ligament, subsequent encounter    2. Sprain of right foot, subsequent encounter    3. Work related injury        Plan:         Medications Ordered This Encounter   Medications    ibuprofen (ADVIL,MOTRIN) 600 MG tablet     Sig: Take 1 tablet (600 mg total) by mouth every 6 (six) hours as needed.     Dispense:  30 tablet     Refill:  1     Patient Instructions: Attention not to aggravate affected area, Apply ice 24-48 hours then apply heat/warm soaks, Elevated affected area, PT to be scheduled once authorized, Use splint as directed(Continue to take  Tylenol or ibuprofen as directed as needed for pain.)   Restrictions: Sit down work only, No Prolonged standing/walking(May return to work with restrictions on 11/16/2020.)  Follow up in about 1 week (around 11/19/2020).

## 2020-11-12 NOTE — LETTER
[facility] [place] [address] [address] [address]  Phone: [phone]  Fax: [phone]  [facility]: [id number]    Pt Name: Rika June  Injury Date: 10/29/2020   Employee ID: [id number] Date of Treatment: 11/12/2020   Company: [employer]      Appointment Time: 09:15 AM Arrived: 9:30 AM   Provider: Katie Murray PA-C Time Out:9:53 AM     Office Treatment:   1. Sprain of right ankle, unspecified ligament, subsequent encounter    2. Sprain of right foot, subsequent encounter    3. Work related injury      Medications Ordered This Encounter   Medications    ibuprofen (ADVIL,MOTRIN) 600 MG tablet      Patient Instructions: Attention not to aggravate affected area, Apply ice 24-48 hours then apply heat/warm soaks, Elevated affected area, PT to be scheduled once authorized(Continue to take Tylenol or ibuprofen as directed as needed for pain.)    Restrictions: Sit down work only, No Prolonged standing/walking(May return to work with restrictions on 11/19/2020.)     Return Appointment: 11/19/2020 at 10:00 AM

## 2020-11-12 NOTE — LETTER
[facility] [place] [address] [address] [address]  Phone: [phone]  Fax: [phone]  [facility]: [id number]    Pt Name: Rika June  Injury Date: 10/29/2020   Employee ID: [id number] Date of Treatment: 11/12/2020   Company: [id number]      Appointment Time: 09:15 AM Arrived: 9:30 AM   Provider: Katie Murray PA-C Time Out:10:05 AM     Office Treatment:   1. Sprain of right ankle, unspecified ligament, subsequent encounter    2. Sprain of right foot, subsequent encounter    3. Work related injury      Medications Ordered This Encounter   Medications    ibuprofen (ADVIL,MOTRIN) 600 MG tablet      Patient Instructions: Attention not to aggravate affected area, Apply ice 24-48 hours then apply heat/warm soaks, Elevated affected area, PT to be scheduled once authorized, Use splint as directed(Continue to take Tylenol or ibuprofen as directed as needed for pain.)    Restrictions: Sit down work only, No Prolonged standing/walking(May return to work with restrictions on 11/16/2020.)     Return Appointment: 11/19/2020 at 10:30 AM

## 2020-11-19 ENCOUNTER — OFFICE VISIT (OUTPATIENT)
Dept: URGENT CARE | Facility: CLINIC | Age: 55
End: 2020-11-19
Payer: COMMERCIAL

## 2020-11-19 DIAGNOSIS — S93.401D SPRAIN OF RIGHT ANKLE, UNSPECIFIED LIGAMENT, SUBSEQUENT ENCOUNTER: Primary | ICD-10-CM

## 2020-11-19 DIAGNOSIS — S93.601D SPRAIN OF RIGHT FOOT, SUBSEQUENT ENCOUNTER: ICD-10-CM
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
## 2020-11-19 PROCEDURE — 99214 PR OFFICE/OUTPT VISIT, EST, LEVL IV, 30-39 MIN: ICD-10-PCS | Mod: S$GLB,,, | Performed by: PHYSICIAN ASSISTANT

## 2020-11-19 PROCEDURE — 99214 OFFICE O/P EST MOD 30 MIN: CPT | Mod: S$GLB,,, | Performed by: PHYSICIAN ASSISTANT

## 2020-11-19 NOTE — LETTER
[facility] [place] [address] [address] [facility], [place] [address]  Phone: [phone]  Fax: [phone]  [facility]: [id number]    Pt Name: Rika June  Injury Date: 10/29/2020   Employee ID: [id number] Date of Treatment: 11/19/2020   Company: [employer]      Appointment Time: 12:45 PM Arrived: 12:45 PM   Provider: Katie Murray PA-C Time Out: 1:29 OM     Office Treatment:   1. Sprain of right ankle, unspecified ligament, subsequent encounter    2. Sprain of right foot, subsequent encounter    3. Work related injury          Patient Instructions: Attention not to aggravate affected area, Daily home exercises/warm soaks, Elevated affected area, Begin Physical Therapy, Use splint as directed    Restrictions: Sit down work only, No Prolonged standing/walking     Return Appointment: 12/3/2020 at 12PM

## 2020-11-19 NOTE — PROGRESS NOTES
Subjective:       Patient ID: Rika June is a 55 y.o. female.    Chief Complaint: Ankle Injury    DOI 10/29/2020    Patient states that she feels her ankle and foot are improving.  Pt states that she is still in pain 4/10.  She does still have swelling to the ankle as well.  She has gotten off the crutches.  She is continuing ice, elevation, and heat intermittently.  She has been wearing the ankle brace that she was given last week.  She has had therapy approved and was contacted and has her 1st session on 11/25/2020.    Ankle Injury   The incident occurred more than 1 week ago. The incident occurred at work. The pain is at a severity of 4/10. The pain is mild. Pertinent negatives include no numbness.       Constitution: Negative for chills, fatigue and fever.   HENT: Negative for congestion and sore throat.    Neck: Negative for painful lymph nodes.   Cardiovascular: Negative for chest pain and leg swelling.   Eyes: Negative for double vision and blurred vision.   Respiratory: Negative for cough and shortness of breath.    Gastrointestinal: Negative for nausea, vomiting and diarrhea.   Genitourinary: Negative for dysuria, frequency, urgency and history of kidney stones.   Musculoskeletal: Positive for pain, trauma, joint pain, joint swelling and abnormal ROM of joint. Negative for arthritis, gout, back pain, muscle cramps, muscle ache and history of spine disorder.   Skin: Negative for color change, pale, rash, erythema and bruising.   Allergic/Immunologic: Negative for seasonal allergies.   Neurological: Negative for dizziness, history of vertigo, light-headedness, passing out, headaches and numbness.   Hematologic/Lymphatic: Negative for swollen lymph nodes.   Psychiatric/Behavioral: Negative for nervous/anxious, sleep disturbance and depression. The patient is not nervous/anxious.         Objective:      Physical Exam  Vitals signs and nursing note reviewed.   Constitutional:       Appearance: She is  well-developed.   HENT:      Head: Normocephalic and atraumatic.   Eyes:      Conjunctiva/sclera: Conjunctivae normal.   Neck:      Musculoskeletal: Normal range of motion and neck supple.      Thyroid: No thyromegaly.   Cardiovascular:      Rate and Rhythm: Normal rate and regular rhythm.      Heart sounds: No murmur. No friction rub. No gallop.    Pulmonary:      Effort: Pulmonary effort is normal.      Breath sounds: Normal breath sounds. No wheezing or rales.   Musculoskeletal:      Right ankle: She exhibits decreased range of motion and swelling. She exhibits no deformity. Tenderness. Lateral malleolus, medial malleolus, AITFL and head of 5th metatarsal tenderness found. Achilles tendon exhibits pain. Achilles tendon exhibits no defect and normal Bee's test results.      Right lower leg: She exhibits no tenderness.      Right foot: Decreased range of motion. Tenderness (generalized) and swelling present.      Comments: Right ankle:  Improved range of motion.  Improved swelling.   Lymphadenopathy:      Cervical: No cervical adenopathy.   Skin:     General: Skin is warm and dry.      Findings: No erythema or rash.   Neurological:      Mental Status: She is alert and oriented to person, place, and time.   Psychiatric:         Behavior: Behavior normal.         Thought Content: Thought content normal.         Judgment: Judgment normal.         Assessment:       1. Sprain of right ankle, unspecified ligament, subsequent encounter    2. Sprain of right foot, subsequent encounter    3. Work related injury        Plan:            Patient Instructions: Attention not to aggravate affected area, Daily home exercises/warm soaks, Elevated affected area, Begin Physical Therapy, Use splint as directed   Restrictions: Sit down work only, No Prolonged standing/walking  Follow up in about 2 weeks (around 12/3/2020).

## 2020-11-20 DIAGNOSIS — Z12.31 OTHER SCREENING MAMMOGRAM: ICD-10-CM

## 2020-11-25 ENCOUNTER — CLINICAL SUPPORT (OUTPATIENT)
Dept: REHABILITATION | Facility: HOSPITAL | Age: 55
End: 2020-11-25
Attending: PHYSICIAN ASSISTANT
Payer: COMMERCIAL

## 2020-11-25 DIAGNOSIS — M25.671 ANKLE STIFFNESS, RIGHT: ICD-10-CM

## 2020-11-25 DIAGNOSIS — R29.898 RIGHT LEG WEAKNESS: ICD-10-CM

## 2020-11-25 DIAGNOSIS — M25.471 EDEMA OF RIGHT ANKLE: ICD-10-CM

## 2020-11-25 DIAGNOSIS — M25.571 ACUTE RIGHT ANKLE PAIN: ICD-10-CM

## 2020-11-25 PROCEDURE — 97162 PT EVAL MOD COMPLEX 30 MIN: CPT | Mod: PO

## 2020-11-25 PROCEDURE — 97530 THERAPEUTIC ACTIVITIES: CPT | Mod: PO

## 2020-11-30 ENCOUNTER — CLINICAL SUPPORT (OUTPATIENT)
Dept: REHABILITATION | Facility: HOSPITAL | Age: 55
End: 2020-11-30
Payer: COMMERCIAL

## 2020-11-30 DIAGNOSIS — M25.471 EDEMA OF RIGHT ANKLE: ICD-10-CM

## 2020-11-30 DIAGNOSIS — M25.671 ANKLE STIFFNESS, RIGHT: ICD-10-CM

## 2020-11-30 DIAGNOSIS — M25.571 ACUTE RIGHT ANKLE PAIN: ICD-10-CM

## 2020-11-30 DIAGNOSIS — R29.898 RIGHT LEG WEAKNESS: ICD-10-CM

## 2020-11-30 PROCEDURE — 97110 THERAPEUTIC EXERCISES: CPT | Mod: PO

## 2020-11-30 PROCEDURE — 97140 MANUAL THERAPY 1/> REGIONS: CPT | Mod: PO

## 2020-11-30 NOTE — PLAN OF CARE
"[facility]  Physical Therapy Initial Evaluation    Name: Rika June  Clinic Number: [id number]    Therapy Diagnosis:   Encounter Diagnoses   Name Primary?    Acute right ankle pain     Ankle stiffness, right     Right leg weakness     Edema of right ankle      Physician: Katie Murray, MADELEINE    Physician Orders: PT Eval and Treat   Medical Diagnosis from Referral:   S93.401D (ICD-10-CM) - Sprain of right ankle, unspecified ligament, subsequent encounter   S93.601D (ICD-10-CM) - Sprain of right foot, subsequent encounter   Y99.0 (ICD-10-CM) - Work related injury     Evaluation Date: 11/25/2020  Authorization Period Expiration: 11/12/2021   Plan of Care Expiration: 11/18/2020  Visit # / Visits authorized: 1/ 9    Time In: 1:00pm  Time Out: 1:45pm  Total Appointment Time (timed & untimed codes): 45 minutes    Precautions: Standard    Subjective   Date of injury: 10/29/2020  History of current condition - Rika reports: that someone fell while walking onto her - Per provider note: "Patient presents for initial worker's comp visit of an injury that occurred today 10/29/2020.  Patient states she was walking with another co-worker and this person fell.  She fell into the patient's right lower leg/ankle.  This occurred approximately 630 this morning.  Patient states that she finished her shift but had significant pain and swelling.  She took an ibuprofen when she got off at approximately 9:00 a.m. this morning.  She did try icing the area but states that it became more painful so she came in for evaluation.  She denies any significant previous injury to this foot or ankle..... Patient states that she feels her ankle and foot are improving.  Pt states that she is still in pain 4/10.  She does still have swelling to the ankle as well.  She has gotten off the crutches.  She is continuing ice, elevation, and heat intermittently.  She has been wearing the ankle brace that she was given last " "week.  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    Medical History:   Past Medical History:   Diagnosis Date    Allergic rhinitis     Allergy     Asthma      Surgical History:   Rika June  has a past surgical history that includes  section and Tubal ligation.    Medications:   Rika has a current medication list which includes the following prescription(s): albuterol, fluticasone propionate, ibuprofen, levocetirizine, tramadol, and tretinoin.    Allergies:   Review of patient's allergies indicates:   Allergen Reactions    Hydrocodone-acetaminophen Nausea Only and Nausea And Vomiting     Other reaction(s): Nausea        Imaging, Xray: No acute displaced fracture or dislocation of the ankle noting nonspecific edema overlying the lateral malleolus.    Social History:  lives with their family    Pain:  Current 5/10, worst 6/10, best 0/10   Location: lateral foot and heel   Description: aching, sharp constant   Aggravating Factors: going down stairs, walking over 10 minutes, standing over 10 minutes,   Alleviating Factors: bracing, OTC meds, RICES    Pt's goals: Return to gainful employment         Decrease ankle pain, swelling    Objective     Observations: Antalgic gait pattern with decreased stance time on the affected LE, increased contralateral step length - pt to clinic with flip flops and trilock ankle lace up/strap brace  Quiet stance: Pes planus present   Palpation: pt ttp to ATFL and CF region  Functional Screening: NT today due to irritability    Ankle AROM (R/L):   Ankle AROM Right (*) = in degrees Left (*) = in degrees " Comments    Dorsiflexion 3* WNL Pain   Plantarflexion 44* WNL Pain MIN   Inversion 27* WNL Pain   Eversion 3*  WNL Pain MOD   Ankle PROM Right (*) = in degrees Left (*) = in degrees Comments    Dorsiflexion 5* NT Anterior impingement present with over pressure? Yes   Plantarflexion 50* NT Discomfort    Inversion 29* NT Discomfort    Eversion 5* NT Discomfort    - Pt limited due to pain with empty end feels throughout all AROM.   - Pt appears apprehensive to move her ankle.    Ankle MMT   Ankle MMT scores Right: X/5 Left: x/5 Comments    Dorsiflexion 3- /5  4+ /5 Anterior discomfort    Plantarflexion  4- /5 5 /5  no signs or symptoms   Inversion 3- /5 5 /5  Lateral discomfort   Eversion 3- /5 4 /5  Lateral discomfort, +CS      Circumferential measures:   Circumferential measures Right in cm   Figure 8 50.5 cm   At [place] 24 cm      Joint Mobility: Limited posterior glide of the talus  Ligamentous testing: Anterior Drawer (+), ATFL Stress test (+), CF Stress test (+)    Functional Job Specific Testing:     Job Specific Task Job Demands Current Ability Deficit? (Yes or No)   1. Standing Entire Shift Occ<>Freq. Yes   2. Walking Frequent Occ Yes     Limitation/Restriction for FOTO Ankle Survey    Therapist reviewed FOTO scores for Rika June on 11/25/2020.   FOTO documents entered into [employer] - see Media section.    Limitation Score: 82%  Goal: 53%       TREATMENT   Treatment Time In: 135pm  Treatment Time Out: 145pm  Total Treatment time (time-based codes) separate from Evaluation: 10 minutes    Rika received therapeutic exercises to develop strength, endurance, ROM and flexibility for 0 minutes including:  Planned:  - Ankle A-Z   - [place]   - Seated PF/DF Dynadisc  - Seated IN/EV Dynadisc  - Ankle 4 way  - Calf stretch with strap    Rika participated in dynamic functional therapeutic activities to improve functional performance for 10 minutes, including:  - HEP review, POC, exam findings, activity  girma, RICE    Home Exercises and Patient Education Provided    Education provided:   - HEP, POC, above    Written Home Exercises Provided: yes.  Exercises were reviewed and Rika was able to demonstrate them prior to the end of the session.  Rika demonstrated good  understanding of the education provided.     See EMR under Patient Instructions for exercises provided 11/25/2020.    Assessment   Rika is a 55 y.o. female referred to outpatient Physical Therapy with a medical diagnosis of sprain of the right ankle and foot. Pt presents with chief c/o R lateral ankle pain after a fall in the workplace. She is with s/s consistent with an inversion ankle sprain with testing (+) for both ATFL and CF ligaments. She displays tenderness to palpation to the ATFL/CF ligament region, severe limited ankle A/PROM in all planes, marked weakness through the R lower leg, edema present to the R leg and moderate to sever pain.   The patient's current job specific task deficits include the following: prolonged standing and walking. She currently ambulates with a lace up tri-lock brace with reports of marked support with this use.     Pt prognosis is Good.     Skilled Physical Therapy intervention is required at this time for the injured worker to address the musculoskeletal limitations and work-related functional deficits for their job as a .    Plan of care discussed with patient: Yes  Pt's spiritual, cultural and educational needs considered and patient is agreeable to the plan of care and goals as stated below:     Anticipated Barriers for therapy: acuity of current injury and self-limiting behaviors due to pain, high self rated disability on FOTO tool (82%)    Medical Necessity is demonstrated by the following  History  Co-morbidities and personal factors that may impact the plan of care Co-morbidities:   asthma, thyroid issues    Personal Factors:   coping style  social background  lifestyle      moderate   Examination  Body Structures and Functions, activity limitations and participation restrictions that may impact the plan of care Body Regions:   lower extremities    Body Systems:    gross symmetry  ROM  strength  balance  gait    Participation Restrictions:   Workplace restrictions    Activity limitations:   Learning and applying knowledge  no deficits    General Tasks and Commands  no deficits    Communication  no deficits    Mobility  walking    Self care  no deficits    Domestic Life  doing house work (cleaning house, washing dishes, laundry)  assisting others    Interactions/Relationships  no deficits    Life Areas  employment    Community and Social Life  community life  recreation and leisure         high   Clinical Presentation evolving clinical presentation with changing clinical characteristics moderate   Decision Making/ Complexity Score: moderate       Goals:     Long Term Goals: 6 weeks   1.) Pt will become compliant and independent with an updated, progressed HEP to promote decreased pain and improved mobility and strength as well as prep for discharge from further PT intervention.   2) Pt to report decrease in pain levels from 6/10 at worse to 2/10 at worse.   3.) Pt will improve R ankle MMT scores by 2 muscle grade to improve functional stability and strength.   4.) Pt will improve her FOTO score from 82% impairment to 53% improvement to indicate improvement in overall function.    5.) Pt will demonstrate full ankle AROM in all planes with no c/o s/s to improve general mobility.   6.) Pt will demonstrate ability to tolerate 30 minutes of 45 minute PT session in standing to simulate RTW standing physical requirements.   7.) Pt will demonstrate ability to ambulate 10 minutes in one bout to simulate RTW walking physical requirements.     Pt will return to work full duty, full time.    Plan   Plan of care Certification: 11/25/2020 to 12/18/2020.    Outpatient Physical Therapy 3 times weekly  for 3 weeks to include the following interventions: Gait Training, Manual Therapy, Moist Heat/ Ice, Neuromuscular Re-ed, Patient Education, Self Care, Therapeutic Activites and Therapeutic Exercise.     Upon discharge from further skilled PT intervention it will determined if the need for a work conditioning program or Functional Capacity Evaluation is required to allow the injured worker to return to work with full potential achieved, continued improvement with body mechanics with advanced functional activities, and further prevention of future work-related injuries.     Luis Fernando Porras, PT  11/30/2020

## 2020-12-01 NOTE — PROGRESS NOTES
Physical Therapy Daily Treatment Note     Name: Rika June  Clinic Number: [id number]    Therapy Diagnosis:   Encounter Diagnoses   Name Primary?    Acute right ankle pain     Ankle stiffness, right     Right leg weakness     Edema of right ankle      Physician: Katie Murray PA-C    Visit Date: 11/30/2020    Physician Orders: PT Eval and Treat   Medical Diagnosis from Referral:   S93.401D (ICD-10-CM) - Sprain of right ankle, unspecified ligament, subsequent encounter   S93.601D (ICD-10-CM) - Sprain of right foot, subsequent encounter   Y99.0 (ICD-10-CM) - Work related injury      Evaluation Date: 11/25/2020  Authorization Period Expiration: 11/12/2021   Plan of Care Expiration: 11/18/2020  Visit # / Visits authorized: 2/9     Time In: 1:00pm  Time Out: 1:45pm  Total Appointment Time (timed & untimed codes): 45 minutes    (# of No Show Appts 0/Number of Cancelled Appts 0)    Precautions: Standard    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    Subjective     Pt reports: that she is feeling a little better as she has kept up with her exercises since her initial eval. Though this is the case she reports ongoing swelling about the ankle laterally and unable to don shoes. Has been wearing a trilock brace and flip-flops due to this.     She was compliant with home exercise program.  Response to previous treatment: no adverse response to eval though her muscles were sore into the afternoon  Functional change: none to date    Pain: 5/10  Location: lateral right ankle    Objective/Treatment     Rika received therapeutic exercises to develop strength, endurance and ROM for 30 minutes including:  - Ankle A-Z x1 rep  - [place]  x2 cups  - Seated PF/DF Dynadisc x20   - Seated IN/EV  "Dynadisc x20  - Ankle 4 way with OTB 2x10 each direction  - Calf stretch with strap 30" x3  - Planned: DF MWM - fwd lunge, weight acceptance and balance activities       Rika received the following manual therapy techniques: Joint mobilizations and Soft tissue Mobilization were applied to the: R ankle/foot for 10 minutes, including:  - DF HVLAT x3  - 1st ray extension JM Gr IV- 2/20  (unable to tolerate posterior distal fibular head glide at this time)  - Gentle G/S complex stretching 2x30"  - Posterior talar glde Gr IV- 2/15    Rika participated in neuromuscular re-education activities to improve: Balance and Proprioception for 0 minutes. The following activities were included:  Not today - planned for future visits    Pt completes 10 minutes ice to the R ankle/foot post session and not billed for.     Home Exercises Provided and Patient Education Provided     Education provided:   - HEP, POC    Written Home Exercises Provided: Patient instructed to cont prior HEP.  Exercises were reviewed and Rika was able to demonstrate them prior to the end of the session.  Rika demonstrated good  understanding of the education provided.     See EMR under Patient Instructions for exercises provided prior visit.    Assessment     Rika is a 55 y.o. female referred to outpatient Physical Therapy with a medical diagnosis of sprain of the right ankle and foot. Pt presents with chief c/o R lateral ankle pain after a fall in the workplace. She is with s/s consistent with an inversion ankle sprain with testing (+) for both ATFL and CF ligaments. She displays tenderness to palpation to the ATFL/CF ligament region, severe limited ankle A/PROM in all planes, marked weakness through the R lower leg, edema present to the R leg and moderate to sever pain. The patient's current job specific task deficits include the following: prolonged standing and walking. She currently ambulates with a lace up tri-lock brace with reports of " marked support with this use.     Today she presents with s/s consistent with her PT initial evaluation. She is with a chief c/o lateral ankle pain and difficulty with WB activities. Initiated an ankle mobility and strengthening program today with open chain. Planning to slowly begin to introduce more weight bearing and functional activities as the pt is able to tolerate. Also completed manual intervention in order to help to reduce pain and improve ankle mobility - no significant changes made today with MT though will f/u next visit.     She put forth good effort today through her PT visit.      The patient's current job specific task deficits include the following: prolonged standing, prolonged walking, lunging, stair negotiation, squatting.     Rika is progressing well towards her goals.   Pt prognosis is Good.     Pt will continue to benefit from skilled Physical Therapy interventions in order to address the deficits listed in the problem list box on initial evaluation, provide education, and to address the musculoskeletal limitations and work-related functional deficits for their job as a .    Pt's spiritual, cultural and educational needs considered and pt agreeable to plan of care and goals.     Anticipated barriers to physical therapy: acuity of current injury and self-limiting behaviors due to pain, high self rated disability on FOTO tool (82%)    Goals:   Long Term Goals: 6 weeks   1.) Pt will become compliant and independent with an updated, progressed HEP to promote decreased pain and improved mobility and strength as well as prep for discharge from further PT intervention. Ongoing, Not Met 12/1/2020   2) Pt to report decrease in pain levels from 6/10 at worse to 2/10 at worse. Ongoing, Not Met 12/1/2020   3.) Pt will improve R ankle MMT scores by 2 muscle grade to improve functional stability and strength. Ongoing, Not Met 12/1/2020   4.) Pt will improve her FOTO score from 82%  impairment to 53% improvement to indicate improvement in overall function.  Ongoing, Not Met 12/1/2020   5.) Pt will demonstrate full ankle AROM in all planes with no c/o s/s to improve general mobility. Ongoing, Not Met 12/1/2020   6.) Pt will demonstrate ability to tolerate 30 minutes of 45 minute PT session in standing to simulate RTW standing physical requirements. Ongoing, Not Met 12/1/2020   7.) Pt will demonstrate ability to ambulate 10 minutes in one bout to simulate RTW walking physical requirements. Ongoing, Not Met 12/1/2020      Pt will return to work full duty, full time. Ongoing, Not Met 12/1/2020       Plan     Plan of care Certification: 11/25/2020 to 12/18/2020.     Outpatient Physical Therapy 3 times weekly for 3 weeks to include the following interventions: Gait Training, Manual Therapy, Moist Heat/ Ice, Neuromuscular Re-ed, Patient Education, Self Care, Therapeutic Activites and Therapeutic Exercise.      Upon discharge from further skilled PT intervention it will determined if the need for a work conditioning program or Functional Capacity Evaluation is required to allow the injured worker to return to work with full potential achieved, continued improvement with body mechanics with advanced functional activities, and further prevention of future work-related injuries.     Luis Fernando Porras, PT   12/1/2020

## 2020-12-02 ENCOUNTER — CLINICAL SUPPORT (OUTPATIENT)
Dept: REHABILITATION | Facility: HOSPITAL | Age: 55
End: 2020-12-02
Payer: COMMERCIAL

## 2020-12-02 DIAGNOSIS — R29.898 RIGHT LEG WEAKNESS: ICD-10-CM

## 2020-12-02 DIAGNOSIS — M25.571 ACUTE RIGHT ANKLE PAIN: ICD-10-CM

## 2020-12-02 DIAGNOSIS — M25.671 ANKLE STIFFNESS, RIGHT: ICD-10-CM

## 2020-12-02 DIAGNOSIS — M25.471 EDEMA OF RIGHT ANKLE: ICD-10-CM

## 2020-12-02 PROCEDURE — 97110 THERAPEUTIC EXERCISES: CPT | Mod: PO

## 2020-12-02 PROCEDURE — 97140 MANUAL THERAPY 1/> REGIONS: CPT | Mod: PO

## 2020-12-02 NOTE — PROGRESS NOTES
Physical Therapy Daily Treatment Note     Name: Rika June  Clinic Number: [id number]    Therapy Diagnosis:   Encounter Diagnoses   Name Primary?    Acute right ankle pain     Ankle stiffness, right     Right leg weakness     Edema of right ankle      Physician: Katie Murray PA-C    Visit Date: 12/2/2020    Physician Orders: PT Eval and Treat   Medical Diagnosis from Referral:   S93.401D (ICD-10-CM) - Sprain of right ankle, unspecified ligament, subsequent encounter   S93.601D (ICD-10-CM) - Sprain of right foot, subsequent encounter   Y99.0 (ICD-10-CM) - Work related injury      Evaluation Date: 11/25/2020  Authorization Period Expiration: 11/12/2021   Plan of Care Expiration: 11/18/2020  Visit # / Visits authorized: 2/9     Time In: 10:00am  Time Out: 10:40am  Total Appointment Time (timed & untimed codes): 40 minutes - 2TE, 1MT    (# of No Show Appts 0/Number of Cancelled Appts 0)    Precautions: Standard    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    Subjective     Pt reports: that she is feeling a little better as she has kept up with her exercises since her initial eval, though reports signficant soreness after her first f/u visit. Reports that she still feels the swelling in her ankle and it feels warm to her most of the time. Due to swelling, she reports that she is unable to don shoes. Has been wearing a trilock brace and flip-flops due to this.     She was compliant with home exercise program.  Response to previous treatment: no adverse response to eval though her muscles were sore into the afternoon  Functional change: none to date    Pain: 5/10  Location: lateral right ankle    Objective/Treatment     Rika received therapeutic exercises to develop strength,  "endurance and ROM for 30 minutes including:  - Ankle A-Z x1 rep  - [place]  x2 cups  - Seated PF/DF Dynadisc x20   - Seated IN/EV Dynadisc x20  - Ankle 4 way with OTB 2x10 each direction  - Calf stretch with strap 30" x3  + DF MWM - fwd lunge 2x10  + Weight acceptance - SLS on the R with L high knee 3" hold 2x10  + Weight acceptance - fwd stepping with the L focusing on increased step length ("Big Steps")   and balance activities       Rika received the following manual therapy techniques: Joint mobilizations and Soft tissue Mobilization were applied to the: R ankle/foot for 10 minutes, including:  - DF HVLAT x3  - 1st ray extension JM Gr IV- 2/20  (unable to tolerate posterior distal fibular head glide at this time)  - Gentle G/S complex stretching 2x30"  - Posterior talar glde Gr IV- 2/15    Rika participated in neuromuscular re-education activities to improve: Balance and Proprioception for 0 minutes. The following activities were included:  Not today - planned for future visits    Pt completes 10 minutes ice to the R ankle/foot post session and not billed for.     Home Exercises Provided and Patient Education Provided     Education provided:   - HEP, POC    Written Home Exercises Provided: Patient instructed to cont prior HEP.  Exercises were reviewed and Rika was able to demonstrate them prior to the end of the session.  Rika demonstrated good  understanding of the education provided.     See EMR under Patient Instructions for exercises provided prior visit.    Assessment     Rika is a 55 y.o. female referred to outpatient Physical Therapy with a medical diagnosis of sprain of the right ankle and foot. Pt presents with chief c/o R lateral ankle pain after a fall in the workplace. She is with s/s consistent with an inversion ankle sprain with testing (+) for both ATFL and CF ligaments. She displays tenderness to palpation to the ATFL/CF ligament region, severe limited ankle A/PROM in all " planes, marked weakness through the R lower leg, edema present to the R leg and moderate to sever pain. The patient's current job specific task deficits include the following: prolonged standing and walking. She currently ambulates with a lace up tri-lock brace with reports of marked support with this use.     Today she presents with s/s consistent with her PT initial evaluation. She is with a chief c/o lateral ankle pain and difficulty with WB activities. Initiated an ankle mobility and strengthening program last visit with open chain. Today, slowly began to introduce more weight bearing and functional activities as the pt is able to tolerate. Also completed manual intervention in order to help to reduce pain and improve ankle mobility - no significant changes made today with MT though will f/u next visit.     She put forth good effort today through her PT visit.      The patient's current job specific task deficits include the following: prolonged standing, prolonged walking, lunging, stair negotiation, squatting.     Rika is progressing well towards her goals.   Pt prognosis is Good.     Pt will continue to benefit from skilled Physical Therapy interventions in order to address the deficits listed in the problem list box on initial evaluation, provide education, and to address the musculoskeletal limitations and work-related functional deficits for their job as a .    Pt's spiritual, cultural and educational needs considered and pt agreeable to plan of care and goals.     Anticipated barriers to physical therapy: acuity of current injury and self-limiting behaviors due to pain, high self rated disability on FOTO tool (82%)    Goals:   Long Term Goals: 6 weeks   1.) Pt will become compliant and independent with an updated, progressed HEP to promote decreased pain and improved mobility and strength as well as prep for discharge from further PT intervention. Ongoing, Not Met 12/2/2020   2) Pt to  report decrease in pain levels from 6/10 at worse to 2/10 at worse. Ongoing, Not Met 12/2/2020   3.) Pt will improve R ankle MMT scores by 2 muscle grade to improve functional stability and strength. Ongoing, Not Met 12/2/2020   4.) Pt will improve her FOTO score from 82% impairment to 53% improvement to indicate improvement in overall function.  Ongoing, Not Met 12/2/2020   5.) Pt will demonstrate full ankle AROM in all planes with no c/o s/s to improve general mobility. Ongoing, Not Met 12/2/2020   6.) Pt will demonstrate ability to tolerate 30 minutes of 45 minute PT session in standing to simulate RTW standing physical requirements. Ongoing, Not Met 12/2/2020   7.) Pt will demonstrate ability to ambulate 10 minutes in one bout to simulate RTW walking physical requirements. Ongoing, Not Met 12/2/2020      Pt will return to work full duty, full time. Ongoing, Not Met 12/2/2020       Plan     Plan of care Certification: 11/25/2020 to 12/18/2020.     Outpatient Physical Therapy 3 times weekly for 3 weeks to include the following interventions: Gait Training, Manual Therapy, Moist Heat/ Ice, Neuromuscular Re-ed, Patient Education, Self Care, Therapeutic Activites and Therapeutic Exercise.      Upon discharge from further skilled PT intervention it will determined if the need for a work conditioning program or Functional Capacity Evaluation is required to allow the injured worker to return to work with full potential achieved, continued improvement with body mechanics with advanced functional activities, and further prevention of future work-related injuries.     Luis Fernando Porras, PT   12/2/2020

## 2020-12-03 ENCOUNTER — OFFICE VISIT (OUTPATIENT)
Dept: URGENT CARE | Facility: CLINIC | Age: 55
End: 2020-12-03
Payer: COMMERCIAL

## 2020-12-03 DIAGNOSIS — S93.401D SPRAIN OF RIGHT ANKLE, UNSPECIFIED LIGAMENT, SUBSEQUENT ENCOUNTER: Primary | ICD-10-CM
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
## 2020-12-03 DIAGNOSIS — S93.601D SPRAIN OF RIGHT FOOT, SUBSEQUENT ENCOUNTER: ICD-10-CM

## 2020-12-03 PROCEDURE — 99214 OFFICE O/P EST MOD 30 MIN: CPT | Mod: S$GLB,,, | Performed by: PHYSICIAN ASSISTANT

## 2020-12-03 PROCEDURE — 99214 PR OFFICE/OUTPT VISIT, EST, LEVL IV, 30-39 MIN: ICD-10-PCS | Mod: S$GLB,,, | Performed by: PHYSICIAN ASSISTANT

## 2020-12-03 NOTE — LETTER
[facility] [address], [place] [address]  Phone: [phone]  Fax: [phone]  [facility]: [phone]    Pt Name: Rika June  Injury Date: 10/29/2020   Employee ID: [id number] Date of Treatment: 12/03/2020   Company: [employer]      Appointment Time: 11:45 AM Arrived: 11:42 AM   Provider: Katie Murray PA-C Time Out: 12:04 PM     Office Treatment:   1. Sprain of right ankle, unspecified ligament, subsequent encounter    2. Sprain of right foot, subsequent encounter    3. Work related injury          Patient Instructions: Attention not to aggravate affected area, Daily home exercises/warm soaks, Apply ice 24-48 hours then apply heat/warm soaks, Elevated affected area, Continue Physical Therapy, Use splint as directed(Take Tylenol and/or ibuprofen as directed as needed for pain)    Restrictions: Sit down work only, No Prolonged standing/walking     Return Appointment: 12/17/2020 at 10:00 AM

## 2020-12-03 NOTE — PROGRESS NOTES
Subjective:       Patient ID: Rika June is a 55 y.o. female.    Chief Complaint: Ankle Injury    DOI 10/29/2020    Patient states that she feels her ankle and foot are about the same.  Pt states that she is still in pain 4/10.  She does still have swelling to the ankle as well.  She has gotten off the crutches.  She is continuing ice, elevation, and heat intermittently.  She has been wearing the ankle brace that she was given last week.She has had 2 PT sessions.    Ankle Injury   The incident occurred more than 1 week ago. The incident occurred at work. The pain is at a severity of 4/10. The pain is mild. Pertinent negatives include no numbness.       Constitution: Negative for chills, fatigue and fever.   HENT: Negative for congestion and sore throat.    Neck: Negative for painful lymph nodes.   Cardiovascular: Negative for chest pain and leg swelling.   Eyes: Negative for double vision and blurred vision.   Respiratory: Negative for cough and shortness of breath.    Gastrointestinal: Negative for nausea, vomiting and diarrhea.   Genitourinary: Negative for dysuria, frequency, urgency and history of kidney stones.   Musculoskeletal: Positive for pain, trauma, joint pain, joint swelling and abnormal ROM of joint. Negative for arthritis, gout, back pain, muscle cramps, muscle ache and history of spine disorder.   Skin: Negative for color change, pale, rash, erythema and bruising.   Allergic/Immunologic: Negative for seasonal allergies.   Neurological: Negative for dizziness, history of vertigo, light-headedness, passing out, headaches and numbness.   Hematologic/Lymphatic: Negative for swollen lymph nodes.   Psychiatric/Behavioral: Negative for nervous/anxious, sleep disturbance and depression. The patient is not nervous/anxious.         Objective:      Physical Exam  Vitals signs and nursing note reviewed.   Constitutional:       Appearance: She is well-developed.   HENT:      Head: Normocephalic and  atraumatic.   Eyes:      Conjunctiva/sclera: Conjunctivae normal.   Neck:      Musculoskeletal: Normal range of motion and neck supple.      Thyroid: No thyromegaly.   Cardiovascular:      Rate and Rhythm: Normal rate and regular rhythm.      Heart sounds: No murmur. No friction rub. No gallop.    Pulmonary:      Effort: Pulmonary effort is normal.      Breath sounds: Normal breath sounds. No wheezing or rales.   Musculoskeletal:      Right ankle: She exhibits decreased range of motion and swelling. She exhibits no deformity. Tenderness. Lateral malleolus, medial malleolus, AITFL and head of 5th metatarsal tenderness found. Achilles tendon exhibits pain. Achilles tendon exhibits no defect and normal Bee's test results.      Right lower leg: She exhibits no tenderness.      Right foot: Decreased range of motion. Tenderness (generalized) and swelling present.      Comments: Right ankle:  Improved range of motion.  Improved swelling.   Lymphadenopathy:      Cervical: No cervical adenopathy.   Skin:     General: Skin is warm and dry.      Findings: No erythema or rash.   Neurological:      Mental Status: She is alert and oriented to person, place, and time.   Psychiatric:         Behavior: Behavior normal.         Thought Content: Thought content normal.         Judgment: Judgment normal.         Assessment:       1. Sprain of right ankle, unspecified ligament, subsequent encounter    2. Sprain of right foot, subsequent encounter    3. Work related injury        Plan:            Patient Instructions: Attention not to aggravate affected area, Daily home exercises/warm soaks, Apply ice 24-48 hours then apply heat/warm soaks, Elevated affected area, Continue Physical Therapy, Use splint as directed(Take Tylenol and/or ibuprofen as directed as needed for pain)   Restrictions: Sit down work only, No Prolonged standing/walking  Follow up in about 2 weeks (around 12/17/2020).

## 2020-12-09 ENCOUNTER — CLINICAL SUPPORT (OUTPATIENT)
Dept: REHABILITATION | Facility: HOSPITAL | Age: 55
End: 2020-12-09
Payer: COMMERCIAL

## 2020-12-09 DIAGNOSIS — M25.471 EDEMA OF RIGHT ANKLE: ICD-10-CM

## 2020-12-09 DIAGNOSIS — M25.671 ANKLE STIFFNESS, RIGHT: ICD-10-CM

## 2020-12-09 DIAGNOSIS — R29.898 RIGHT LEG WEAKNESS: ICD-10-CM

## 2020-12-09 DIAGNOSIS — M25.571 ACUTE RIGHT ANKLE PAIN: ICD-10-CM

## 2020-12-09 PROCEDURE — 97110 THERAPEUTIC EXERCISES: CPT | Mod: PO,CQ

## 2020-12-09 NOTE — PROGRESS NOTES
"Physical Therapy Daily Treatment Note     Name: iRka June  Clinic Number: [id number]    Therapy Diagnosis:   No diagnosis found.  Physician: Katie Murray PA-C    Visit Date: 12/9/2020    Physician Orders: PT Eval and Treat   Medical Diagnosis from Referral:   S93.401D (ICD-10-CM) - Sprain of right ankle, unspecified ligament, subsequent encounter   S93.601D (ICD-10-CM) - Sprain of right foot, subsequent encounter   Y99.0 (ICD-10-CM) - Work related injury      Evaluation Date: 11/25/2020  Authorization Period Expiration: 11/12/2021   Plan of Care Expiration: 11/18/2020  Visit # / Visits authorized: 3/9     Time In: 2:05am  Time Out: 2:50am  Total Appointment Time (timed & untimed codes): 45 minutes - 2TE, 1MT    (# of No Show Appts 0/Number of Cancelled Appts 0)    Precautions: Standard    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    Subjective     Pt reports: that her R ankle swelling has decreased gradually since the last tx visit.   She was compliant with home exercise program.  Response to previous treatment: no adverse response   Functional change: none to date    Pain: 5/10  Location: lateral right ankle    Objective/Treatment     Rika received therapeutic exercises to develop strength, endurance and ROM for 42 minutes including:    - Ankle A-Z x1 rep  - [place]  x2 cups  - Seated PF/DF Dynadisc x20   - Seated IN/EV Dynadisc x20  - Ankle 4 way with OTB 2x10 each direction  - Calf stretch with strap 30" x3  - DF MWM - fwd lunge 2x10  - Weight acceptance - SLS on the R with L high knee 3" hold 2x10  - Weight acceptance - fwd stepping with the L focusing on increased step length ("Big Steps")   - static standing on foam in tandem stance: 4x30 sec  - static standing on " "BOSU, round side up: 2x30 sec  - static standing on BOSU, flat side up: 2x30 sec      Rika received the following manual therapy techniques: Joint mobilizations and Soft tissue Mobilization were applied to the: R ankle/foot for 3 minutes, including:  - DF HVLAT x3  - 1st ray extension JM Gr IV- 2/20  (unable to tolerate posterior distal fibular head glide at this time)  - Gentle G/S complex stretching 2x30"  - Posterior talar glde Gr IV- 2/15    Rika participated in neuromuscular re-education activities to improve: Balance and Proprioception for 0 minutes. The following activities were included:  Not today - planned for future visits    Pt completes 10 minutes ice to the R ankle/foot post session and not billed for.     Home Exercises Provided and Patient Education Provided     Education provided:   - HEP, POC    Written Home Exercises Provided: Patient instructed to cont prior HEP.  Exercises were reviewed and Rika was able to demonstrate them prior to the end of the session.  Rika demonstrated good  understanding of the education provided.     See EMR under Patient Instructions for exercises provided prior visit.    Assessment     Rika is a 55 y.o. female referred to outpatient Physical Therapy with a medical diagnosis of sprain of the right ankle and foot. Pt presents with chief c/o R lateral ankle pain after a fall in the workplace. She is with s/s consistent with an inversion ankle sprain with testing (+) for both ATFL and CF ligaments. She displays tenderness to palpation to the ATFL/CF ligament region, severe limited ankle A/PROM in all planes, marked weakness through the R lower leg, edema present to the R leg and moderate to sever pain. The patient's current job specific task deficits include the following: prolonged standing and walking. She currently ambulates with a lace up tri-lock brace with reports of marked support with this use.     Today she presents with s/s consistent with her PT " initial evaluation. She is with a chief c/o lateral ankle pain and difficulty with WB activities. Expanded ankle stability exercises during today's tx visit. Pt continues to endorse pain with increased weight bearing. Also completed manual intervention in order to help to reduce pain and improve ankle mobility - no significant changes made today with MT though will f/u next visit.     The patient's current job specific task deficits include the following: prolonged standing, prolonged walking, lunging, stair negotiation, squatting.     Rika is progressing well towards her goals.   Pt prognosis is Good.     Pt will continue to benefit from skilled Physical Therapy interventions in order to address the deficits listed in the problem list box on initial evaluation, provide education, and to address the musculoskeletal limitations and work-related functional deficits for their job as a .    Pt's spiritual, cultural and educational needs considered and pt agreeable to plan of care and goals.     Anticipated barriers to physical therapy: acuity of current injury and self-limiting behaviors due to pain, high self rated disability on FOTO tool (82%)    Goals:   Long Term Goals: 6 weeks   1.) Pt will become compliant and independent with an updated, progressed HEP to promote decreased pain and improved mobility and strength as well as prep for discharge from further PT intervention. Ongoing, Not Met 12/9/2020   2) Pt to report decrease in pain levels from 6/10 at worse to 2/10 at worse. Ongoing, Not Met 12/9/2020   3.) Pt will improve R ankle MMT scores by 2 muscle grade to improve functional stability and strength. Ongoing, Not Met 12/9/2020   4.) Pt will improve her FOTO score from 82% impairment to 53% improvement to indicate improvement in overall function.  Ongoing, Not Met 12/9/2020   5.) Pt will demonstrate full ankle AROM in all planes with no c/o s/s to improve general mobility. Ongoing, Not Met  12/9/2020   6.) Pt will demonstrate ability to tolerate 30 minutes of 45 minute PT session in standing to simulate RTW standing physical requirements. Ongoing, Not Met 12/9/2020   7.) Pt will demonstrate ability to ambulate 10 minutes in one bout to simulate RTW walking physical requirements. Ongoing, Not Met 12/9/2020      Pt will return to work full duty, full time. Ongoing, Not Met 12/9/2020     Plan     Plan of care Certification: 11/25/2020 to 12/18/2020.     Outpatient Physical Therapy 3 times weekly for 3 weeks to include the following interventions: Gait Training, Manual Therapy, Moist Heat/ Ice, Neuromuscular Re-ed, Patient Education, Self Care, Therapeutic Activites and Therapeutic Exercise.      Upon discharge from further skilled PT intervention it will determined if the need for a work conditioning program or Functional Capacity Evaluation is required to allow the injured worker to return to work with full potential achieved, continued improvement with body mechanics with advanced functional activities, and further prevention of future work-related injuries.     Kraig Kwok, PTA   12/9/2020

## 2020-12-11 ENCOUNTER — CLINICAL SUPPORT (OUTPATIENT)
Dept: REHABILITATION | Facility: HOSPITAL | Age: 55
End: 2020-12-11
Payer: COMMERCIAL

## 2020-12-11 DIAGNOSIS — M25.571 ACUTE RIGHT ANKLE PAIN: ICD-10-CM

## 2020-12-11 DIAGNOSIS — R29.898 RIGHT LEG WEAKNESS: ICD-10-CM

## 2020-12-11 DIAGNOSIS — M25.671 ANKLE STIFFNESS, RIGHT: ICD-10-CM

## 2020-12-11 DIAGNOSIS — M25.471 EDEMA OF RIGHT ANKLE: ICD-10-CM

## 2020-12-11 PROCEDURE — 97110 THERAPEUTIC EXERCISES: CPT | Mod: PO,CQ

## 2020-12-11 NOTE — PROGRESS NOTES
"Physical Therapy Daily Treatment Note     Name: Rika June  Clinic Number: [id number]    Therapy Diagnosis:   No diagnosis found.  Physician: Katie Murray PA-C    Visit Date: 12/11/2020    Physician Orders: PT Eval and Treat   Medical Diagnosis from Referral:   S93.401D (ICD-10-CM) - Sprain of right ankle, unspecified ligament, subsequent encounter   S93.601D (ICD-10-CM) - Sprain of right foot, subsequent encounter   Y99.0 (ICD-10-CM) - Work related injury      Evaluation Date: 11/25/2020  Authorization Period Expiration: 11/12/2021   Plan of Care Expiration: 11/18/2020  Visit # / Visits authorized: 4/9     Time In: 2:00 pm  Time Out: 2:50 am  Total Appointment Time (timed & untimed codes): 50 minutes - 2TE, 1MT    (# of No Show Appts 0/Number of Cancelled Appts 0)    Precautions: Standard    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    Subjective     Pt reports: that she has been experiencing increased pain in the middle of her ankle joint since the last tx visit.     She was compliant with home exercise program.  Response to previous treatment: no adverse response   Functional change: none to date    Pain: 5/10  Location: lateral right ankle    Objective/Treatment     Rika received therapeutic exercises to develop strength, endurance and ROM for 45 minutes including:    - Ankle A-Z x1 rep  - [place]  x2 cups  - Seated PF/DF Dynadisc x20   - Seated IN/EV Dynadisc x20  - Ankle 4 way with OTB 2x10 each direction  - Calf stretch with strap 30" x3    - DF MWM - fwd lunge 2x10    - Weight acceptance - SLS on the R with L high knee 3" hold 2x10  - Weight acceptance - fwd stepping with the L focusing on increased step length ("Big Steps")   - static standing on foam in tandem " "stance: 4x30 sec  - static standing on BOSU, round side up: 2x30 sec  - static standing on BOSU, flat side up: 2x30 sec  - fwd/bkw shifting on 2 pt fitter: 2 min      Rika received the following manual therapy techniques: Joint mobilizations and Soft tissue Mobilization were applied to the: R ankle/foot for 5 minutes, including:  - DF HVLAT x3  - 1st ray extension JM Gr IV- 2/20  (unable to tolerate posterior distal fibular head glide at this time)  - Gentle G/S complex stretching 2x30"  - Posterior talar glde Gr IV- 2/15  - Talocrural joint distraction      Rika participated in neuromuscular re-education activities to improve: Balance and Proprioception for 0 minutes. The following activities were included:  Not today - planned for future visits    Pt completes 10 minutes ice to the R ankle/foot post session and not billed for.     Home Exercises Provided and Patient Education Provided     Education provided:   - HEP, POC    Written Home Exercises Provided: Patient instructed to cont prior HEP.  Exercises were reviewed and Rika was able to demonstrate them prior to the end of the session.  Rika demonstrated good  understanding of the education provided.     See EMR under Patient Instructions for exercises provided prior visit.    Assessment     Rika is a 55 y.o. female referred to outpatient Physical Therapy with a medical diagnosis of sprain of the right ankle and foot. Pt presents with chief c/o R lateral ankle pain after a fall in the workplace. She is with s/s consistent with an inversion ankle sprain with testing (+) for both ATFL and CF ligaments. She displays tenderness to palpation to the ATFL/CF ligament region, severe limited ankle A/PROM in all planes, marked weakness through the R lower leg, edema present to the R leg and moderate to sever pain. The patient's current job specific task deficits include the following: prolonged standing and walking. She currently ambulates with a lace up " tri-lock brace with reports of marked support with this use.     Pt endorses increased pain in talocrural joint since the last tx visit. This could be due to the expansion of ankle stability exercises during today's tx visit. Pt also reported pain with ankle dorsiflexion in long sitting. However, it was noted that pat did not report any ankle pain with dorsiflexion combined with manual talus/calcaneal distraction from the tibia.     The patient's current job specific task deficits include the following: prolonged standing, prolonged walking, lunging, stair negotiation, squatting.     Rika is progressing well towards her goals.   Pt prognosis is Good.     Pt will continue to benefit from skilled Physical Therapy interventions in order to address the deficits listed in the problem list box on initial evaluation, provide education, and to address the musculoskeletal limitations and work-related functional deficits for their job as a .    Pt's spiritual, cultural and educational needs considered and pt agreeable to plan of care and goals.     Anticipated barriers to physical therapy: acuity of current injury and self-limiting behaviors due to pain, high self rated disability on FOTO tool (82%)    Goals:   Long Term Goals: 6 weeks   1.) Pt will become compliant and independent with an updated, progressed HEP to promote decreased pain and improved mobility and strength as well as prep for discharge from further PT intervention. Ongoing, Not Met 12/11/2020   2) Pt to report decrease in pain levels from 6/10 at worse to 2/10 at worse. Ongoing, Not Met 12/11/2020   3.) Pt will improve R ankle MMT scores by 2 muscle grade to improve functional stability and strength. Ongoing, Not Met 12/11/2020   4.) Pt will improve her FOTO score from 82% impairment to 53% improvement to indicate improvement in overall function.  Ongoing, Not Met 12/11/2020   5.) Pt will demonstrate full ankle AROM in all planes with no  c/o s/s to improve general mobility. Ongoing, Not Met 12/11/2020   6.) Pt will demonstrate ability to tolerate 30 minutes of 45 minute PT session in standing to simulate RTW standing physical requirements. Ongoing, Not Met 12/11/2020   7.) Pt will demonstrate ability to ambulate 10 minutes in one bout to simulate RTW walking physical requirements. Ongoing, Not Met 12/11/2020      Pt will return to work full duty, full time. Ongoing, Not Met 12/11/2020     Plan     Plan of care Certification: 11/25/2020 to 12/18/2020.     Outpatient Physical Therapy 3 times weekly for 3 weeks to include the following interventions: Gait Training, Manual Therapy, Moist Heat/ Ice, Neuromuscular Re-ed, Patient Education, Self Care, Therapeutic Activites and Therapeutic Exercise.      Upon discharge from further skilled PT intervention it will determined if the need for a work conditioning program or Functional Capacity Evaluation is required to allow the injured worker to return to work with full potential achieved, continued improvement with body mechanics with advanced functional activities, and further prevention of future work-related injuries.     Kraig Kwok, PTA   12/11/2020

## 2020-12-16 ENCOUNTER — CLINICAL SUPPORT (OUTPATIENT)
Dept: REHABILITATION | Facility: HOSPITAL | Age: 55
End: 2020-12-16
Payer: COMMERCIAL

## 2020-12-16 DIAGNOSIS — M25.671 ANKLE STIFFNESS, RIGHT: ICD-10-CM

## 2020-12-16 DIAGNOSIS — R29.898 RIGHT LEG WEAKNESS: ICD-10-CM

## 2020-12-16 DIAGNOSIS — M25.571 ACUTE RIGHT ANKLE PAIN: ICD-10-CM

## 2020-12-16 DIAGNOSIS — M25.471 EDEMA OF RIGHT ANKLE: ICD-10-CM

## 2020-12-16 PROCEDURE — 97110 THERAPEUTIC EXERCISES: CPT | Mod: PO,CQ

## 2020-12-16 NOTE — PROGRESS NOTES
"Physical Therapy Daily Treatment Note     Name: Rika June  Clinic Number: [id number]    Therapy Diagnosis:   No diagnosis found.  Physician: Katie Murray PA-C    Visit Date: 12/16/2020    Physician Orders: PT Eval and Treat   Medical Diagnosis from Referral:   S93.401D (ICD-10-CM) - Sprain of right ankle, unspecified ligament, subsequent encounter   S93.601D (ICD-10-CM) - Sprain of right foot, subsequent encounter   Y99.0 (ICD-10-CM) - Work related injury      Evaluation Date: 11/25/2020  Authorization Period Expiration: 11/12/2021   Plan of Care Expiration: 11/18/2020  Visit # / Visits authorized: 6/9     Time In: 2:00 pm  Time Out: 2:50 am  Total Appointment Time (timed & untimed codes): 50 minutes - 2TE, 1MT    (# of No Show Appts 0/Number of Cancelled Appts 0)    Precautions: Standard    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    Subjective     Pt reports: that she has been experiencing increased pain in the middle of her ankle joint since the last tx visit.     She was compliant with home exercise program.  Response to previous treatment: no adverse response   Functional change: none to date    Pain: 5/10  Location: lateral right ankle    Objective/Treatment     Rika received therapeutic exercises to develop strength, endurance and ROM for 45 minutes including:    - Ankle A-Z x1 rep  - [place]  x2 cups  - Seated PF/DF Dynadisc x20   - Seated IN/EV Dynadisc x20  - Ankle 4 way with OTB 2x10 each direction  - Calf stretch with strap 30" x3    - DF MWM - fwd lunge 2x10    - Weight acceptance - SLS on the R with L high knee 3" hold 2x10  - Weight acceptance - fwd stepping with the L focusing on increased step length ("Big Steps")   - static standing on foam in tandem " "stance: 4x30 sec  - static standing on BOSU, round side up: 2x30 sec  - static standing on BOSU, flat side up: 2x30 sec  - fwd/bkw shifting on 2 pt fitter: 2 min      Rika received the following manual therapy techniques: Joint mobilizations and Soft tissue Mobilization were applied to the: R ankle/foot for 5 minutes, including:  - DF HVLAT x3  - 1st ray extension JM Gr IV- 2/20  (unable to tolerate posterior distal fibular head glide at this time)  - Gentle G/S complex stretching 2x30"  - Posterior talar glde Gr IV- 2/15  - Talocrural joint distraction      Rika participated in neuromuscular re-education activities to improve: Balance and Proprioception for 0 minutes. The following activities were included:  Not today - planned for future visits    Pt completes 10 minutes ice to the R ankle/foot post session and not billed for.     Home Exercises Provided and Patient Education Provided     Education provided:   - HEP, POC    Written Home Exercises Provided: Patient instructed to cont prior HEP.  Exercises were reviewed and Rika was able to demonstrate them prior to the end of the session.  Rika demonstrated good  understanding of the education provided.     See EMR under Patient Instructions for exercises provided prior visit.    Assessment     Rika is a 55 y.o. female referred to outpatient Physical Therapy with a medical diagnosis of sprain of the right ankle and foot. Pt presents with chief c/o R lateral ankle pain after a fall in the workplace. She is with s/s consistent with an inversion ankle sprain with testing (+) for both ATFL and CF ligaments. She displays tenderness to palpation to the ATFL/CF ligament region, severe limited ankle A/PROM in all planes, marked weakness through the R lower leg, edema present to the R leg and moderate to sever pain. The patient's current job specific task deficits include the following: prolonged standing and walking. She currently ambulates with a lace up " tri-lock brace with reports of marked support with this use.     Pt endorses increased pain in talocrural joint since the last tx visit. This could be due to the expansion of ankle stability exercises during today's tx visit. Pt also reported pain with ankle dorsiflexion in long sitting. However, it was noted that pat did not report any ankle pain with dorsiflexion combined with manual talus/calcaneal distraction from the tibia.     The patient's current job specific task deficits include the following: prolonged standing, prolonged walking, lunging, stair negotiation, squatting.     Rika is progressing well towards her goals.   Pt prognosis is Good.     Pt will continue to benefit from skilled Physical Therapy interventions in order to address the deficits listed in the problem list box on initial evaluation, provide education, and to address the musculoskeletal limitations and work-related functional deficits for their job as a .    Pt's spiritual, cultural and educational needs considered and pt agreeable to plan of care and goals.     Anticipated barriers to physical therapy: acuity of current injury and self-limiting behaviors due to pain, high self rated disability on FOTO tool (82%)    Goals:   Long Term Goals: 6 weeks   1.) Pt will become compliant and independent with an updated, progressed HEP to promote decreased pain and improved mobility and strength as well as prep for discharge from further PT intervention. Ongoing, Not Met 12/16/2020   2) Pt to report decrease in pain levels from 6/10 at worse to 2/10 at worse. Ongoing, Not Met 12/16/2020   3.) Pt will improve R ankle MMT scores by 2 muscle grade to improve functional stability and strength. Ongoing, Not Met 12/16/2020   4.) Pt will improve her FOTO score from 82% impairment to 53% improvement to indicate improvement in overall function.  Ongoing, Not Met 12/16/2020   5.) Pt will demonstrate full ankle AROM in all planes with no  c/o s/s to improve general mobility. Ongoing, Not Met 12/16/2020   6.) Pt will demonstrate ability to tolerate 30 minutes of 45 minute PT session in standing to simulate RTW standing physical requirements. Ongoing, Not Met 12/16/2020   7.) Pt will demonstrate ability to ambulate 10 minutes in one bout to simulate RTW walking physical requirements. Ongoing, Not Met 12/16/2020      Pt will return to work full duty, full time. Ongoing, Not Met 12/16/2020     Plan     Plan of care Certification: 11/25/2020 to 12/18/2020.     Outpatient Physical Therapy 3 times weekly for 3 weeks to include the following interventions: Gait Training, Manual Therapy, Moist Heat/ Ice, Neuromuscular Re-ed, Patient Education, Self Care, Therapeutic Activites and Therapeutic Exercise.      Upon discharge from further skilled PT intervention it will determined if the need for a work conditioning program or Functional Capacity Evaluation is required to allow the injured worker to return to work with full potential achieved, continued improvement with body mechanics with advanced functional activities, and further prevention of future work-related injuries.     Luis Fernando Porras, PT   12/16/2020
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
## 2020-12-17 ENCOUNTER — OFFICE VISIT (OUTPATIENT)
Dept: URGENT CARE | Facility: CLINIC | Age: 55
End: 2020-12-17
Payer: COMMERCIAL

## 2020-12-17 DIAGNOSIS — S93.601D SPRAIN OF RIGHT FOOT, SUBSEQUENT ENCOUNTER: ICD-10-CM
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
## 2020-12-17 DIAGNOSIS — S93.401D SPRAIN OF RIGHT ANKLE, UNSPECIFIED LIGAMENT, SUBSEQUENT ENCOUNTER: Primary | ICD-10-CM

## 2020-12-17 PROCEDURE — 99214 PR OFFICE/OUTPT VISIT, EST, LEVL IV, 30-39 MIN: ICD-10-PCS | Mod: S$GLB,,, | Performed by: PHYSICIAN ASSISTANT

## 2020-12-17 PROCEDURE — 99214 OFFICE O/P EST MOD 30 MIN: CPT | Mod: S$GLB,,, | Performed by: PHYSICIAN ASSISTANT

## 2020-12-17 NOTE — PROGRESS NOTES
Subjective:       Patient ID: Rika June is a 55 y.o. female.    Chief Complaint: Ankle Injury    Patient continues with right ankle pain and swelling.  She is currently in physical therapy and has had 6 visits.  She is wearing the ankle brace as directed.  She is taking ibuprofen occasionally.  Patient states she had slight setback this week.  She says 3 days ago she fell getting out of her car onto her right knee.  She says she may have retwisted her right ankle.      Ankle Injury   The incident occurred more than 1 week ago. The incident occurred at work. Pertinent negatives include no inability to bear weight, loss of motion, loss of sensation, muscle weakness, numbness or tingling. She reports no foreign bodies present.       Constitution: Negative for chills, fatigue and fever.   HENT: Negative for congestion and sore throat.    Neck: Negative for painful lymph nodes.   Cardiovascular: Negative for chest pain and leg swelling.   Eyes: Negative for double vision and blurred vision.   Respiratory: Negative for cough and shortness of breath.    Gastrointestinal: Negative for nausea, vomiting and diarrhea.   Genitourinary: Negative for dysuria, frequency, urgency and history of kidney stones.   Musculoskeletal: Positive for pain, trauma, joint pain, joint swelling and abnormal ROM of joint. Negative for arthritis, gout, back pain, muscle cramps, muscle ache and history of spine disorder.   Skin: Negative for color change, pale, rash, erythema and bruising.   Allergic/Immunologic: Negative for seasonal allergies.   Neurological: Negative for dizziness, history of vertigo, light-headedness, passing out, headaches and numbness.   Hematologic/Lymphatic: Negative for swollen lymph nodes.   Psychiatric/Behavioral: Negative for nervous/anxious, sleep disturbance and depression. The patient is not nervous/anxious.         Objective:      Physical Exam  Vitals signs and nursing note reviewed.   Constitutional:        Appearance: She is well-developed.   HENT:      Head: Normocephalic and atraumatic.   Eyes:      Conjunctiva/sclera: Conjunctivae normal.   Neck:      Musculoskeletal: Normal range of motion and neck supple.      Thyroid: No thyromegaly.   Cardiovascular:      Rate and Rhythm: Normal rate and regular rhythm.      Heart sounds: No murmur. No friction rub. No gallop.    Pulmonary:      Effort: Pulmonary effort is normal.      Breath sounds: Normal breath sounds. No wheezing or rales.   Musculoskeletal:      Right ankle: She exhibits decreased range of motion and swelling. She exhibits no deformity. Tenderness. Lateral malleolus, medial malleolus, AITFL and head of 5th metatarsal tenderness found. Achilles tendon exhibits pain. Achilles tendon exhibits no defect and normal Bee's test results.      Right lower leg: She exhibits no tenderness.      Right foot: Decreased range of motion. Tenderness (generalized) and swelling present.      Comments: Right ankle:  Improved range of motion.  Improved swelling.   Lymphadenopathy:      Cervical: No cervical adenopathy.   Skin:     General: Skin is warm and dry.      Findings: No erythema or rash.   Neurological:      Mental Status: She is alert and oriented to person, place, and time.   Psychiatric:         Behavior: Behavior normal.         Thought Content: Thought content normal.         Judgment: Judgment normal.         Assessment:       1. Sprain of right ankle, unspecified ligament, subsequent encounter    2. Sprain of right foot, subsequent encounter    3. Work related injury        Plan:            Patient Instructions: Attention not to aggravate affected area, Daily home exercises/warm soaks, Apply ice 24-48 hours then apply heat/warm soaks, Elevated affected area, Continue Physical Therapy   Restrictions: Sit down work only, No Prolonged standing/walking(Must wear ankle brace)  Follow up in about 2 weeks (around 12/31/2020).

## 2020-12-17 NOTE — LETTER
[facility] [address] [address] [address]  Phone: [phone]  Fax: [phone]  [facility]: [phone]    Pt Name: Rika June  Injury Date: 10/29/2020   Employee ID:  Date of Treatment: 12/17/2020   Company: [employer]      Appointment Time: 09:45 AM Arrived: 09:50 AM   Provider: Katie Murray PA-C Time Out:10:20 AM     Office Treatment:   1. Sprain of right ankle, unspecified ligament, subsequent encounter    2. Sprain of right foot, subsequent encounter    3. Work related injury          Patient Instructions: Attention not to aggravate affected area, Daily home exercises/warm soaks, Apply ice 24-48 hours then apply heat/warm soaks, Elevated affected area, Continue Physical Therapy      Restrictions: Sit down work only, No Prolonged standing/walking(Must wear ankle brace)     Return Appointment: 12/31/2020 at 10:00 AM    PRIMITIVO

## 2020-12-18 ENCOUNTER — CLINICAL SUPPORT (OUTPATIENT)
Dept: REHABILITATION | Facility: HOSPITAL | Age: 55
End: 2020-12-18
Payer: COMMERCIAL

## 2020-12-18 DIAGNOSIS — M25.471 EDEMA OF RIGHT ANKLE: ICD-10-CM

## 2020-12-18 DIAGNOSIS — R29.898 RIGHT LEG WEAKNESS: ICD-10-CM

## 2020-12-18 DIAGNOSIS — M25.671 ANKLE STIFFNESS, RIGHT: ICD-10-CM

## 2020-12-18 DIAGNOSIS — M25.571 ACUTE RIGHT ANKLE PAIN: ICD-10-CM

## 2020-12-18 PROCEDURE — 97110 THERAPEUTIC EXERCISES: CPT | Mod: PO,CQ

## 2020-12-18 PROCEDURE — 97140 MANUAL THERAPY 1/> REGIONS: CPT | Mod: PO,CQ

## 2020-12-18 NOTE — PROGRESS NOTES
"Physical Therapy Daily Treatment Note     Name: Rika June  Clinic Number: [id number]    Therapy Diagnosis:   Encounter Diagnoses   Name Primary?    Acute right ankle pain     Ankle stiffness, right     Right leg weakness     Edema of right ankle      Physician: Katie Murray PA-C    Visit Date: 12/18/2020    Physician Orders: PT Eval and Treat   Medical Diagnosis from Referral:   S93.401D (ICD-10-CM) - Sprain of right ankle, unspecified ligament, subsequent encounter   S93.601D (ICD-10-CM) - Sprain of right foot, subsequent encounter   Y99.0 (ICD-10-CM) - Work related injury      Evaluation Date: 11/25/2020  Authorization Period Expiration: 11/12/2021   Plan of Care Expiration: 11/18/2020  Visit # / Visits authorized: 7/9     Time In: 11:45 pm  Time Out: 12:35 am  Total Appointment Time (timed & untimed codes): 50 minutes - 2TE, 1MT    (# of No Show Appts 0/Number of Cancelled Appts 0)    Precautions: Standard    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    Subjective     Pt reports: that he pain in the middle of her ankle has dissipated and she is just having lateral ankle pain at this time.   She was compliant with home exercise program.  Response to previous treatment: no adverse response   Functional change: none to date    Pain: 5/10  Location: lateral right ankle    Objective/Treatment     Rika received therapeutic exercises to develop strength, endurance and ROM for 45 minutes including:    - Ankle A-Z x1 rep  - [place]  x2 cups  - Seated PF/DF Dynadisc x20   - Seated IN/EV Dynadisc x20  - Ankle 4 way with OTB 2x10 each direction  - Calf stretch with strap 30" x3    - DF MWM - fwd lunge 2x10    - Weight acceptance - SLS on the R with L high knee 3" hold 2x10  - " "Weight acceptance - fwd stepping with the L focusing on increased step length ("Big Steps")   - static standing on foam in tandem stance: 4x30 sec  - static standing on BOSU, round side up: 2x30 sec  - static standing on BOSU, flat side up: 2x30 sec  - fwd/bkw shifting on 2 pt fitter: 2 min      Rika received the following manual therapy techniques: Joint mobilizations and Soft tissue Mobilization were applied to the: R ankle/foot for 5 minutes, including:  - DF HVLAT x3  - 1st ray extension JM Gr IV- 2/20  (unable to tolerate posterior distal fibular head glide at this time)  - Gentle G/S complex stretching 2x30"  - Posterior talar glde Gr IV- 2/15  - Talocrural joint distraction      Rika participated in neuromuscular re-education activities to improve: Balance and Proprioception for 0 minutes. The following activities were included:  Not today - planned for future visits    Pt completes 10 minutes ice to the R ankle/foot post session and not billed for.     Home Exercises Provided and Patient Education Provided     Education provided:   - HEP, POC    Written Home Exercises Provided: Patient instructed to cont prior HEP and HEP issued today.   Exercises were reviewed and Rika was able to demonstrate them prior to the end of the session.  Rika demonstrated good  understanding of the education provided.     See EMR under Patient Instructions for exercises provided prior visit and 12/18/20    Assessment     Rika is a 55 y.o. female referred to outpatient Physical Therapy with a medical diagnosis of sprain of the right ankle and foot. Pt presents with chief c/o R lateral ankle pain after a fall in the workplace. She is with s/s consistent with an inversion ankle sprain with testing (+) for both ATFL and CF ligaments. She displays tenderness to palpation to the ATFL/CF ligament region, severe limited ankle A/PROM in all planes, marked weakness through the R lower leg, edema present to the R leg and " moderate to sever pain. The patient's current job specific task deficits include the following: prolonged standing and walking.     Pt provided good effort and tolerance during today's tx visit. lt was noted that pt endorsed decreased pain in talocrural joint during DF when compared to previous tx visit. Pt noted min lateral ankle pain with resisted ankle inversion and eversion, IV>EV. It was also noted that pt was not donning the lace up tri-lock brace upon arrival. HEP was expanded in order to promote proximal stability in order to indirectly improve ankle stability in single leg. Will progress as tolerated.     The patient's current job specific task deficits include the following: prolonged standing, prolonged walking, lunging, stair negotiation, squatting.     Rika is progressing well towards her goals.   Pt prognosis is Good.     Pt will continue to benefit from skilled Physical Therapy interventions in order to address the deficits listed in the problem list box on initial evaluation, provide education, and to address the musculoskeletal limitations and work-related functional deficits for their job as a .    Pt's spiritual, cultural and educational needs considered and pt agreeable to plan of care and goals.     Anticipated barriers to physical therapy: acuity of current injury and self-limiting behaviors due to pain, high self rated disability on FOTO tool (82%)    Goals:   Long Term Goals: 6 weeks   1.) Pt will become compliant and independent with an updated, progressed HEP to promote decreased pain and improved mobility and strength as well as prep for discharge from further PT intervention. Ongoing, Not Met 12/18/2020   2) Pt to report decrease in pain levels from 6/10 at worse to 2/10 at worse. Ongoing, Not Met 12/18/2020   3.) Pt will improve R ankle MMT scores by 2 muscle grade to improve functional stability and strength. Ongoing, Not Met 12/18/2020   4.) Pt will improve her FOTO  score from 82% impairment to 53% improvement to indicate improvement in overall function.  Ongoing, Not Met 12/18/2020   5.) Pt will demonstrate full ankle AROM in all planes with no c/o s/s to improve general mobility. Ongoing, Not Met 12/18/2020   6.) Pt will demonstrate ability to tolerate 30 minutes of 45 minute PT session in standing to simulate RTW standing physical requirements. Ongoing, Not Met 12/18/2020   7.) Pt will demonstrate ability to ambulate 10 minutes in one bout to simulate RTW walking physical requirements. Ongoing, Not Met 12/18/2020      Pt will return to work full duty, full time. Ongoing, Not Met 12/18/2020     Plan     Plan of care Certification: 11/25/2020 to 12/18/2020.     Outpatient Physical Therapy 3 times weekly for 3 weeks to include the following interventions: Gait Training, Manual Therapy, Moist Heat/ Ice, Neuromuscular Re-ed, Patient Education, Self Care, Therapeutic Activites and Therapeutic Exercise.      Upon discharge from further skilled PT intervention it will determined if the need for a work conditioning program or Functional Capacity Evaluation is required to allow the injured worker to return to work with full potential achieved, continued improvement with body mechanics with advanced functional activities, and further prevention of future work-related injuries.     Kraig Kwok, PTA   12/18/2020

## 2020-12-30 ENCOUNTER — CLINICAL SUPPORT (OUTPATIENT)
Dept: REHABILITATION | Facility: HOSPITAL | Age: 55
End: 2020-12-30
Payer: COMMERCIAL

## 2020-12-30 DIAGNOSIS — M25.471 EDEMA OF RIGHT ANKLE: ICD-10-CM

## 2020-12-30 DIAGNOSIS — M25.571 ACUTE RIGHT ANKLE PAIN: ICD-10-CM

## 2020-12-30 DIAGNOSIS — M25.671 ANKLE STIFFNESS, RIGHT: ICD-10-CM

## 2020-12-30 DIAGNOSIS — R29.898 RIGHT LEG WEAKNESS: ICD-10-CM

## 2020-12-30 PROCEDURE — 97110 THERAPEUTIC EXERCISES: CPT | Mod: PO

## 2020-12-30 NOTE — PROGRESS NOTES
Physical Therapy Daily Treatment Note     Name: Rika June  Clinic Number: [id number]    Therapy Diagnosis:   Encounter Diagnoses   Name Primary?    Acute right ankle pain     Ankle stiffness, right     Right leg weakness     Edema of right ankle      Physician: Katie Murray PA-C    Visit Date: 12/30/2020    Physician Orders: PT Eval and Treat   Medical Diagnosis from Referral:   S93.401D (ICD-10-CM) - Sprain of right ankle, unspecified ligament, subsequent encounter   S93.601D (ICD-10-CM) - Sprain of right foot, subsequent encounter   Y99.0 (ICD-10-CM) - Work related injury      Evaluation Date: 11/25/2020  Authorization Period Expiration: 11/12/2021   Plan of Care Expiration: 11/18/2020  Visit # / Visits authorized: 7/9     Time In: 12:15pm  Time Out: 1:00pm  Total Appointment Time (timed & untimed codes): 45 minutes - 3TE    (# of No Show Appts 0/Number of Cancelled Appts 0)    Precautions: Standard    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    Subjective     Pt reports: that she is continuing to have intermittent swelling to the lateral ankle. Reports most pain still there (points to approx ATFL region). Most of the pain in the middle of her ankle has dissipated, but chief c/o lateral ankle pain at this time.   She was compliant with home exercise program.  Response to previous treatment: no adverse response   Functional change: none to date    Pain: 5/10  Location: lateral right ankle    Objective/Treatment     Observations: Antalgic gait pattern with decreased stance time on the affected LE, increased contralateral step length - appears to be habitual at this time  Quiet stance: Pes planus present   Palpation: pt ttp to ATFL only    Ankle AROM (R/L):   Ankle AROM Right  "(*) = in degrees  Eval  Currently Left (*) = in degrees Comments    Dorsiflexion 3* > 6*  WNL Pain   Plantarflexion 44* > 49*  WNL Pain MIN   Inversion 27* > 33*  WNL Pain   Eversion 3* > 32*  WNL Pain MOD   Ankle PROM Right (*) = in degrees Left (*) = in degrees Comments    Dorsiflexion 5* > 14*  NT Anterior impingement present with over pressure? Yes   Plantarflexion 50* > 54*  NT Discomfort    Inversion 29* > 42*  NT Discomfort    Eversion 5* > 37*  NT Discomfort       Ankle MMT   Ankle MMT scores Right: X/5 Left: x/5 Comments    Dorsiflexion 3- /5  4+ /5 Anterior discomfort    Plantarflexion  4 /5 5 /5  no signs or symptoms   Inversion 3- /5 5 /5  Lateral discomfort   Eversion 3- /5 4 /5  Lateral discomfort, +CS       Circumferential measures:   Circumferential measures Right in cm   Figure 8 50.5 cm > 50.2 cm   At [place] 24 cm > 23.9 cm      Joint Mobility: Limited posterior glide of the talus  Ligamentous testing:  ATFL Stress test (+), CF Stress test (+ on eval, currently -)     Functional Job Specific Testing:      Job Specific Task Job Demands Current Ability Deficit? (Yes or No)   1. Standing Entire Shift Occ<>Freq. Yes   2. Walking Frequent Occ Yes      Limitation/Restriction for FOTO Ankle Survey     Therapist reviewed FOTO scores for Rika June on 11/25/2020.   FOTO documents entered into [employer] - see Media section.     Limitation Score: 82% on Eval  Currently: 37% (Significant improvement!!)   Goal: 53%           Rika received therapeutic exercises to develop strength, endurance and ROM for 45 minutes including activities below and measurements above:    - Ankle A-Z x1 rep  - [place]  x2 cups  - Seated PF/DF Dynadisc x20   - Seated IN/EV Dynadisc x20  - Ankle 4 way with OTB 2x10 each direction  - Calf stretch with strap 30" x3    - DF MWM - fwd lunge 2x10    - Weight acceptance - SLS on the R with L high knee 3" hold 2x10  - Weight acceptance - fwd stepping with the L focusing on " "increased step length ("Big Steps")   - static standing on foam in tandem stance: 4x30 sec  - static standing on BOSU, round side up: 2x30 sec  - static standing on BOSU, flat side up: 2x30 sec  - fwd/bkw shifting on 2 pt fitter: 2 min    Rika received the following manual therapy techniques: Joint mobilizations and Soft tissue Mobilization were applied to the: R ankle/foot for 0 minutes, including:  - DF HVLAT x3  - 1st ray extension JM Gr IV- 2/20  (unable to tolerate posterior distal fibular head glide at this time)  - Gentle G/S complex stretching 2x30"  - Posterior talar glde Gr IV- 2/15  - Talocrural joint distraction      Rika participated in neuromuscular re-education activities to improve: Balance and Proprioception for 0 minutes. The following activities were included:  Not today - planned for future visits    Pt completes 10 minutes ice to the R ankle/foot post session and not billed for.     Home Exercises Provided and Patient Education Provided     Education provided:   - HEP, POC    Written Home Exercises Provided: Patient instructed to cont prior HEP and HEP issued today.   Exercises were reviewed and Rika was able to demonstrate them prior to the end of the session.  Rika demonstrated good  understanding of the education provided.     See EMR under Patient Instructions for exercises provided prior visit and 12/18/20    Assessment     Rika is a 55 y.o. female referred to outpatient Physical Therapy with a medical diagnosis of sprain of the right ankle and foot. Pt presents with chief c/o R lateral ankle pain after a fall in the workplace.     At the time of her initial PT evaluation, she presented with s/s consistent with an inversion ankle sprain with testing (+) for both ATFL and CF ligaments. Currently, she is positive with only ATFL testing with calcaneofibular ligament testing negative. This is consistent with palpation as she is now only tender to palpation to the ATFL region with " this being her chief c/o.   At the time of her initial evaluation she displayed severe limited ankle A/PROM in all planes, marked weakness through the R lower leg. Currently, she has demonstrated marked improvements with ankle A/PROM in all planes of motion as indicated above; however, she remains most limited with A/PROM dorsiflexion.   Furthermore, she displayed high self rated disability on her initial evaluation at 83% based on the FOTO tool. Currently, she rates herself at 37% impaired indicating significant improvements.   The patient's current job specific task deficits include the following: prolonged standing, walking, lunging, stair negotiation, and squatting. She continues to display an antalgic gait pattern at this time, though no structural deficits appears to be the root cause. At this time appears more habitual in nature and have begun activities to reduce this pattern. The pt does well with said activities; however, carry over with gait pattern afterwards is fair.     Pt continues to provide good effort through all treatment sessions. Plan to continue to progress the pt as she is able to tolerate with functional strengthening, RTW tasks, and ongoing interventions to improve dorsiflexion A/PROM.     Rika is progressing well towards her goals.   Pt prognosis is Good.     Pt will continue to benefit from skilled Physical Therapy interventions in order to address the deficits listed in the problem list box on initial evaluation, provide education, and to address the musculoskeletal limitations and work-related functional deficits for their job as a .    Pt's spiritual, cultural and educational needs considered and pt agreeable to plan of care and goals.     Anticipated barriers to physical therapy: acuity of current injury and self-limiting behaviors due to pain, high self rated disability on FOTO tool (82%)    Goals:   Long Term Goals: 6 weeks   1.) Pt will become compliant and  independent with an updated, progressed HEP to promote decreased pain and improved mobility and strength as well as prep for discharge from further PT intervention. Met 12/30/2020  2) Pt to report decrease in pain levels from 6/10 at worse to 2/10 at worse. Ongoing, Not Met 12/31/2020   3.) Pt will improve R ankle MMT scores by 2 muscle grade to improve functional stability and strength. Ongoing, Not Met 12/31/2020   4.) Pt will improve her FOTO score from 82% impairment to 53% improvement to indicate improvement in overall function.  Met 12/30/2020  5.) Pt will demonstrate full ankle AROM in all planes with no c/o s/s to improve general mobility. Ongoing, Not Met 12/31/2020   6.) Pt will demonstrate ability to tolerate 30 minutes of 45 minute PT session in standing to simulate RTW standing physical requirements. Met 12/30/2020  7.) Pt will demonstrate ability to ambulate 10 minutes in one bout to simulate RTW walking physical requirements. Ongoing, Not Met 12/31/2020      Pt will return to work full duty, full time. Ongoing, Not Met 12/31/2020     Plan     See updated POC.     At this time the pt would benefit from further PT intervention. She has 1 remaining PT visit authorized at this time.   She will be f/u with her referring provider tomorrow on 12/31/2020. She would benefit from a new PT order, referral to continue to improve towards RTW goals. Important to note that she will be out of the clinic next week visiting with her daughter and is scheduled for the following week.     Luis Fernando Porras, PT   12/31/2020

## 2020-12-31 ENCOUNTER — OFFICE VISIT (OUTPATIENT)
Dept: URGENT CARE | Facility: CLINIC | Age: 55
End: 2020-12-31
Payer: COMMERCIAL

## 2020-12-31 DIAGNOSIS — S93.401D SPRAIN OF RIGHT ANKLE, UNSPECIFIED LIGAMENT, SUBSEQUENT ENCOUNTER: Primary | ICD-10-CM
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
## 2020-12-31 DIAGNOSIS — S93.601D SPRAIN OF RIGHT FOOT, SUBSEQUENT ENCOUNTER: ICD-10-CM

## 2020-12-31 PROCEDURE — 99214 PR OFFICE/OUTPT VISIT, EST, LEVL IV, 30-39 MIN: ICD-10-PCS | Mod: S$GLB,,, | Performed by: PHYSICIAN ASSISTANT

## 2020-12-31 PROCEDURE — 99214 OFFICE O/P EST MOD 30 MIN: CPT | Mod: S$GLB,,, | Performed by: PHYSICIAN ASSISTANT

## 2020-12-31 RX ORDER — IBUPROFEN 600 MG/1
600 TABLET ORAL EVERY 6 HOURS PRN
Qty: 30 TABLET | Refills: 1 | Status: SHIPPED | OUTPATIENT
Start: 2020-12-31 | End: 2021-01-30

## 2020-12-31 NOTE — PLAN OF CARE
Outpatient Therapy Updated Plan of Care     Visit Date: 12/30/2020    Name: Rika June  Clinic Number: [id number]    Therapy Diagnosis:   Encounter Diagnoses   Name Primary?    Acute right ankle pain     Ankle stiffness, right     Right leg weakness     Edema of right ankle      Physician: Katie Murray, MADELEINE    Physician Orders: PT Eval and Treat   Medical Diagnosis from Referral:   S93.401D (ICD-10-CM) - Sprain of right ankle, unspecified ligament, subsequent encounter   S93.601D (ICD-10-CM) - Sprain of right foot, subsequent encounter   Y99.0 (ICD-10-CM) - Work related injury      Evaluation Date: 11/25/2020  Authorization Period Expiration: 11/12/2021   Plan of Care Expiration: 11/18/2020  Visit # / Visits authorized: 7/9     Time In: 12:15pm  Time Out: 1:00pm  Total Appointment Time (timed & untimed codes): 45 minutes - 3TE    Subjective     Update: Pt reports: that she is continuing to have intermittent swelling to the lateral ankle. Reports most pain still there (points to approx ATFL region). Most of the pain in the middle of her ankle has dissipated, but chief c/o lateral ankle pain at this time.   She was compliant with home exercise program.  Response to previous treatment: no adverse response   Functional change: none to date    Pain: 5/10  Location: lateral right ankle    Objective     Update:   Observations: Antalgic gait pattern with decreased stance time on the affected LE, increased contralateral step length - appears to be habitual at this time  Quiet stance: Pes planus present   Palpation: pt ttp to ATFL only    Ankle AROM (R/L):   Ankle AROM Right (*) = in degrees  Eval  Currently Left (*) = in degrees Comments    Dorsiflexion 3* > 6*  WNL Pain   Plantarflexion 44* > 49*  WNL Pain MIN   Inversion 27* > 33*  WNL Pain   Eversion 3* > 32*  WNL Pain MOD   Ankle PROM Right (*) = in degrees Left (*) = in degrees Comments    Dorsiflexion 5* > 14*  NT Anterior impingement present with over  pressure? Yes   Plantarflexion 50* > 54*  NT Discomfort    Inversion 29* > 42*  NT Discomfort    Eversion 5* > 37*  NT Discomfort       Ankle MMT   Ankle MMT scores Right: X/5 Left: x/5 Comments    Dorsiflexion 3- /5  4+ /5 Anterior discomfort    Plantarflexion  4 /5 5 /5  no signs or symptoms   Inversion 3- /5 5 /5  Lateral discomfort   Eversion 3- /5 4 /5  Lateral discomfort, +CS       Circumferential measures:   Circumferential measures Right in cm   Figure 8 50.5 cm > 50.2 cm   At [place] 24 cm > 23.9 cm      Joint Mobility: Limited posterior glide of the talus  Ligamentous testing:  ATFL Stress test (+), CF Stress test (+ on eval, currently -)     Functional Job Specific Testing:      Job Specific Task Job Demands Current Ability Deficit? (Yes or No)   1. Standing Entire Shift Occ<>Freq. Yes   2. Walking Frequent Occ Yes      Limitation/Restriction for FOTO Ankle Survey     Therapist reviewed FOTO scores for Rika June on 11/25/2020.   FOTO documents entered into [employer] - see Media section.     Limitation Score: 82% on Eval  Currently: 37% (Significant improvement!!)   Goal: 53%           Assessment     Update:   Rika is a 55 y.o. female referred to outpatient Physical Therapy with a medical diagnosis of sprain of the right ankle and foot. Pt presents with chief c/o R lateral ankle pain after a fall in the workplace.     At the time of her initial PT evaluation, she presented with s/s consistent with an inversion ankle sprain with testing (+) for both ATFL and CF ligaments. Currently, she is positive with only ATFL testing with calcaneofibular ligament testing negative. This is consistent with palpation as she is now only tender to palpation to the ATFL region with this being her chief c/o.   At the time of her initial evaluation she displayed severe limited ankle A/PROM in all planes, marked weakness through the R lower leg. Currently, she has demonstrated marked improvements with ankle A/PROM in all  planes of motion as indicated above; however, she remains most limited with A/PROM dorsiflexion.   Furthermore, she displayed high self rated disability on her initial evaluation at 83% based on the FOTO tool. Currently, she rates herself at 37% impaired indicating significant improvements.   The patient's current job specific task deficits include the following: prolonged standing, walking, lunging, stair negotiation, and squatting. She continues to display an antalgic gait pattern at this time, though no structural deficits appears to be the root cause. At this time appears more habitual in nature and have begun activities to reduce this pattern. The pt does well with said activities; however, carry over with gait pattern afterwards is fair.     Pt continues to provide good effort through all treatment sessions. Plan to continue to progress the pt as she is able to tolerate with functional strengthening, RTW tasks, and ongoing interventions to improve dorsiflexion A/PROM.     Rika is progressing well towards her goals.   Pt prognosis is Good.     Pt will continue to benefit from skilled Physical Therapy interventions in order to address the deficits listed in the problem list box on initial evaluation, provide education, and to address the musculoskeletal limitations and work-related functional deficits for their job as a .    Long Term Goals: 6 weeks   1.) Pt will become compliant and independent with an updated, progressed HEP to promote decreased pain and improved mobility and strength as well as prep for discharge from further PT intervention. Met 12/30/2020  2) Pt to report decrease in pain levels from 6/10 at worse to 2/10 at worse. Ongoing, Not Met 12/31/2020   3.) Pt will improve R ankle MMT scores by 2 muscle grade to improve functional stability and strength. Ongoing, Not Met 12/31/2020   4.) Pt will improve her FOTO score from 82% impairment to 53% improvement to indicate improvement  in overall function.  Met 12/30/2020  5.) Pt will demonstrate full ankle AROM in all planes with no c/o s/s to improve general mobility. Ongoing, Not Met 12/31/2020   6.) Pt will demonstrate ability to tolerate 30 minutes of 45 minute PT session in standing to simulate RTW standing physical requirements. Met 12/30/2020  7.) Pt will demonstrate ability to ambulate 10 minutes in one bout to simulate RTW walking physical requirements. Ongoing, Not Met 12/31/2020      Pt will return to work full duty, full time. Ongoing, Not Met 12/31/2020     Long Term Goal Status:   continue per initial plan of care.  Reasons for Recertification of Therapy:   Ongoing impairments and functional deficits    Plan     Updated Certification Period: 12/30/2020 to 2/5/2020  Recommended Treatment Plan: 3 times per week for 3 weeks: Gait Training, Manual Therapy, Moist Heat/ Ice, Neuromuscular Re-ed, Patient Education, Self Care, Therapeutic Activites and Therapeutic Exercise  Other Recommendations:   At this time the pt would benefit from further PT intervention. She has 1 remaining PT visit authorized at this time.   She will be f/u with her referring provider tomorrow on 12/31/2020. She would benefit from a new PT order, referral to continue to improve towards RTW goals. Important to note that she will be out of the clinic next week visiting with her daughter and is scheduled for the following week.     Luis Fernando Porras, PT  12/30/2020      I CERTIFY THE NEED FOR THESE SERVICES FURNISHED UNDER THIS PLAN OF TREATMENT AND WHILE UNDER MY CARE    Physician's comments:        Physician's Signature: ___________________________________________________

## 2020-12-31 NOTE — PROGRESS NOTES
Subjective:       Patient ID: Rika June is a 55 y.o. female.    Chief Complaint: Ankle Injury    Pt is here for a follow up for a work related injury that occurred on 10/29/2020.  Her pain and swelling or both improving in the right ankle and foot.  She finished her 1st round of therapy.  I received an e-mail from her physical therapist to advised that she would benefit from a 2nd round of therapy.  She is continuing to take ibuprofen twice a day.  She is continuing to wear the ankle brace whenever she is ambulatory.    Ankle Injury   The incident occurred more than 1 week ago. The incident occurred at work. The pain has been intermittent since onset. Pertinent negatives include no inability to bear weight, loss of motion, loss of sensation, muscle weakness, numbness or tingling. She reports no foreign bodies present.       Constitution: Negative for chills, fatigue and fever.   HENT: Negative for congestion and sore throat.    Neck: Negative for painful lymph nodes.   Cardiovascular: Negative for chest pain and leg swelling.   Eyes: Negative for double vision and blurred vision.   Respiratory: Negative for cough and shortness of breath.    Gastrointestinal: Negative for nausea, vomiting and diarrhea.   Genitourinary: Negative for dysuria, frequency, urgency and history of kidney stones.   Musculoskeletal: Positive for pain, trauma, joint pain, joint swelling and abnormal ROM of joint. Negative for arthritis, gout, back pain, muscle cramps, muscle ache and history of spine disorder.   Skin: Negative for color change, pale, rash, erythema and bruising.   Allergic/Immunologic: Negative for seasonal allergies.   Neurological: Negative for dizziness, history of vertigo, light-headedness, passing out, headaches and numbness.   Hematologic/Lymphatic: Negative for swollen lymph nodes.   Psychiatric/Behavioral: Negative for nervous/anxious, sleep disturbance and depression. The patient is not nervous/anxious.          Objective:      Physical Exam  Vitals signs and nursing note reviewed.   Constitutional:       Appearance: She is well-developed.   HENT:      Head: Normocephalic and atraumatic.   Eyes:      Conjunctiva/sclera: Conjunctivae normal.   Neck:      Musculoskeletal: Normal range of motion and neck supple.      Thyroid: No thyromegaly.   Cardiovascular:      Rate and Rhythm: Normal rate and regular rhythm.      Heart sounds: No murmur. No friction rub. No gallop.    Pulmonary:      Effort: Pulmonary effort is normal.      Breath sounds: Normal breath sounds. No wheezing or rales.   Musculoskeletal:      Right ankle: She exhibits decreased range of motion and swelling. She exhibits no deformity. Tenderness. Lateral malleolus, medial malleolus, AITFL and head of 5th metatarsal tenderness found. Achilles tendon exhibits no pain, no defect and normal Ebe's test results.      Right lower leg: She exhibits no tenderness.      Right foot: Normal range of motion. No tenderness or swelling.      Comments: Right ankle:  Improved range of motion.  Improved swelling.   Lymphadenopathy:      Cervical: No cervical adenopathy.   Skin:     General: Skin is warm and dry.      Findings: No erythema or rash.   Neurological:      Mental Status: She is alert and oriented to person, place, and time.   Psychiatric:         Behavior: Behavior normal.         Thought Content: Thought content normal.         Judgment: Judgment normal.         Assessment:       1. Sprain of right ankle, unspecified ligament, subsequent encounter    2. Sprain of right foot, subsequent encounter    3. Work related injury        Plan:       I have placed an order for another round of physical therapy.  I have suggested that the patient start to less and how often she wears her ankle brace.  I suggested that she try to not wear it when she is at home.  She can still wear it if she has to leave the house or go to work.  I suggested possibly a compression sleeve  to the area for comfort.  Will re-evaluate in 2 weeks.    Medications Ordered This Encounter   Medications    ibuprofen (ADVIL,MOTRIN) 600 MG tablet     Sig: Take 1 tablet (600 mg total) by mouth every 6 (six) hours as needed.     Dispense:  30 tablet     Refill:  1     Patient Instructions: Attention not to aggravate affected area, Daily home exercises/warm soaks, Apply ice 24-48 hours then apply heat/warm soaks, Elevated affected area, Continue Physical Therapy(Continue to wear the brace at work however start going around the house without the brace on.  Continue to take ibuprofen as directed as needed for pain.)   Restrictions: Sit down work only, No Prolonged standing/walking(Must wear brace at work)  Follow up in about 2 weeks (around 1/14/2021).

## 2020-12-31 NOTE — LETTER
[facility] [place] [address] [address] [place] [address]  Phone: [phone]  Fax: [phone]  [facility]: [phone]    Pt Name: Rika June  Injury Date: 10/29/2020   Employee ID:  Date of First Treatment: 12/31/2020   Company: [employer] reference to record [id number]      Appointment Time: 09:45 AM Arrived: 9:45 AM   Provider: Katie Murray PA-C Time Out: 10:18 AM      Office Treatment:   1. Sprain of right ankle, unspecified ligament, subsequent encounter    2. Sprain of right foot, subsequent encounter    3. Work related injury          Patient Instructions: Attention not to aggravate affected area, Daily home exercises/warm soaks, Apply ice 24-48 hours then apply heat/warm soaks, Elevated affected area, Continue Physical Therapy(Continue to wear the brace at work however start going around the house without the brace on.  Continue to take ibuprofen as directed as needed for pain.)    Restrictions: Sit down work only, No Prolonged standing/walking(Must wear brace at work)     Return Appointment: 01/14/2021 at 9:30 AM

## 2021-01-04 ENCOUNTER — PATIENT MESSAGE (OUTPATIENT)
Dept: ADMINISTRATIVE | Facility: HOSPITAL | Age: 56
End: 2021-01-04

## 2021-01-11 ENCOUNTER — CLINICAL SUPPORT (OUTPATIENT)
Dept: REHABILITATION | Facility: HOSPITAL | Age: 56
End: 2021-01-11
Payer: COMMERCIAL

## 2021-01-11 DIAGNOSIS — M25.671 ANKLE STIFFNESS, RIGHT: ICD-10-CM

## 2021-01-11 DIAGNOSIS — M25.571 ACUTE RIGHT ANKLE PAIN: ICD-10-CM

## 2021-01-11 DIAGNOSIS — M25.471 EDEMA OF RIGHT ANKLE: ICD-10-CM

## 2021-01-11 DIAGNOSIS — R29.898 RIGHT LEG WEAKNESS: ICD-10-CM

## 2021-01-11 PROCEDURE — 97140 MANUAL THERAPY 1/> REGIONS: CPT | Mod: PO

## 2021-01-11 PROCEDURE — 97110 THERAPEUTIC EXERCISES: CPT | Mod: PO

## 2021-01-13 ENCOUNTER — CLINICAL SUPPORT (OUTPATIENT)
Dept: REHABILITATION | Facility: HOSPITAL | Age: 56
End: 2021-01-13
Payer: COMMERCIAL

## 2021-01-13 DIAGNOSIS — R29.898 RIGHT LEG WEAKNESS: ICD-10-CM

## 2021-01-13 DIAGNOSIS — M25.671 ANKLE STIFFNESS, RIGHT: ICD-10-CM

## 2021-01-13 DIAGNOSIS — M25.471 EDEMA OF RIGHT ANKLE: ICD-10-CM

## 2021-01-13 DIAGNOSIS — M25.571 ACUTE RIGHT ANKLE PAIN: ICD-10-CM

## 2021-01-13 PROCEDURE — 97110 THERAPEUTIC EXERCISES: CPT | Mod: PO

## 2021-01-14 ENCOUNTER — CLINICAL SUPPORT (OUTPATIENT)
Dept: REHABILITATION | Facility: HOSPITAL | Age: 56
End: 2021-01-14
Payer: COMMERCIAL

## 2021-01-14 ENCOUNTER — OFFICE VISIT (OUTPATIENT)
Dept: URGENT CARE | Facility: CLINIC | Age: 56
End: 2021-01-14
Payer: COMMERCIAL

## 2021-01-14 DIAGNOSIS — M25.671 ANKLE STIFFNESS, RIGHT: ICD-10-CM

## 2021-01-14 DIAGNOSIS — S93.401D SPRAIN OF RIGHT ANKLE, UNSPECIFIED LIGAMENT, SUBSEQUENT ENCOUNTER: Primary | ICD-10-CM
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
## 2021-01-14 DIAGNOSIS — R29.898 RIGHT LEG WEAKNESS: ICD-10-CM

## 2021-01-14 DIAGNOSIS — M25.571 ACUTE RIGHT ANKLE PAIN: ICD-10-CM

## 2021-01-14 DIAGNOSIS — S93.601D SPRAIN OF RIGHT FOOT, SUBSEQUENT ENCOUNTER: ICD-10-CM

## 2021-01-14 DIAGNOSIS — M25.471 EDEMA OF RIGHT ANKLE: ICD-10-CM

## 2021-01-14 PROCEDURE — 97110 THERAPEUTIC EXERCISES: CPT | Mod: PO,CQ

## 2021-01-14 PROCEDURE — 99213 OFFICE O/P EST LOW 20 MIN: CPT | Mod: S$GLB,,, | Performed by: PHYSICIAN ASSISTANT

## 2021-01-14 PROCEDURE — 99213 PR OFFICE/OUTPT VISIT, EST, LEVL III, 20-29 MIN: ICD-10-PCS | Mod: S$GLB,,, | Performed by: PHYSICIAN ASSISTANT

## 2021-01-20 ENCOUNTER — CLINICAL SUPPORT (OUTPATIENT)
Dept: REHABILITATION | Facility: HOSPITAL | Age: 56
End: 2021-01-20
Payer: COMMERCIAL

## 2021-01-20 DIAGNOSIS — M25.571 ACUTE RIGHT ANKLE PAIN: ICD-10-CM

## 2021-01-20 DIAGNOSIS — M25.671 ANKLE STIFFNESS, RIGHT: ICD-10-CM

## 2021-01-20 DIAGNOSIS — M25.471 EDEMA OF RIGHT ANKLE: ICD-10-CM

## 2021-01-20 DIAGNOSIS — R29.898 RIGHT LEG WEAKNESS: ICD-10-CM

## 2021-01-20 PROCEDURE — 97110 THERAPEUTIC EXERCISES: CPT | Mod: PO

## 2021-01-22 ENCOUNTER — CLINICAL SUPPORT (OUTPATIENT)
Dept: REHABILITATION | Facility: HOSPITAL | Age: 56
End: 2021-01-22
Payer: COMMERCIAL

## 2021-01-22 DIAGNOSIS — M25.671 ANKLE STIFFNESS, RIGHT: ICD-10-CM

## 2021-01-22 DIAGNOSIS — M25.571 ACUTE RIGHT ANKLE PAIN: ICD-10-CM

## 2021-01-22 DIAGNOSIS — M25.471 EDEMA OF RIGHT ANKLE: ICD-10-CM

## 2021-01-22 DIAGNOSIS — R29.898 RIGHT LEG WEAKNESS: ICD-10-CM

## 2021-01-22 PROCEDURE — 97110 THERAPEUTIC EXERCISES: CPT | Mod: PO,CQ

## 2021-01-27 ENCOUNTER — CLINICAL SUPPORT (OUTPATIENT)
Dept: REHABILITATION | Facility: HOSPITAL | Age: 56
End: 2021-01-27
Attending: FAMILY MEDICINE
Payer: COMMERCIAL

## 2021-01-27 ENCOUNTER — HOSPITAL ENCOUNTER (OUTPATIENT)
Dept: RADIOLOGY | Facility: HOSPITAL | Age: 56
Discharge: HOME | End: 2021-01-27
Attending: PHYSICIAN ASSISTANT
Payer: COMMERCIAL
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
## 2021-01-27 DIAGNOSIS — M25.571 ACUTE RIGHT ANKLE PAIN: ICD-10-CM

## 2021-01-27 DIAGNOSIS — R29.898 RIGHT LEG WEAKNESS: ICD-10-CM

## 2021-01-27 DIAGNOSIS — S93.401D SPRAIN OF RIGHT ANKLE, UNSPECIFIED LIGAMENT, SUBSEQUENT ENCOUNTER: ICD-10-CM

## 2021-01-27 DIAGNOSIS — M25.671 ANKLE STIFFNESS, RIGHT: ICD-10-CM

## 2021-01-27 DIAGNOSIS — M25.471 EDEMA OF RIGHT ANKLE: ICD-10-CM

## 2021-01-27 PROCEDURE — 97110 THERAPEUTIC EXERCISES: CPT | Mod: PO

## 2021-01-27 PROCEDURE — 73721 MRI JNT OF LWR EXTRE W/O DYE: CPT | Mod: TC,RT

## 2021-01-27 PROCEDURE — 73721 MRI ANKLE WITHOUT CONTRAST RIGHT: ICD-10-PCS | Mod: 26,RT,, | Performed by: RADIOLOGY

## 2021-01-27 PROCEDURE — 73721 MRI JNT OF LWR EXTRE W/O DYE: CPT | Mod: 26,RT,, | Performed by: RADIOLOGY

## 2021-01-28 ENCOUNTER — OFFICE VISIT (OUTPATIENT)
Dept: URGENT CARE | Facility: CLINIC | Age: 56
End: 2021-01-28
Payer: COMMERCIAL
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
## 2021-01-28 DIAGNOSIS — M67.88 ACHILLES TENDONOSIS: ICD-10-CM

## 2021-01-28 DIAGNOSIS — S93.601D SPRAIN OF RIGHT FOOT, SUBSEQUENT ENCOUNTER: ICD-10-CM

## 2021-01-28 DIAGNOSIS — S93.401D SPRAIN OF RIGHT ANKLE, UNSPECIFIED LIGAMENT, SUBSEQUENT ENCOUNTER: Primary | ICD-10-CM

## 2021-01-28 PROCEDURE — 99214 PR OFFICE/OUTPT VISIT, EST, LEVL IV, 30-39 MIN: ICD-10-PCS | Mod: S$GLB,,, | Performed by: PHYSICIAN ASSISTANT

## 2021-01-28 PROCEDURE — 99214 OFFICE O/P EST MOD 30 MIN: CPT | Mod: S$GLB,,, | Performed by: PHYSICIAN ASSISTANT

## 2021-01-28 RX ORDER — IBUPROFEN 600 MG/1
600 TABLET ORAL EVERY 6 HOURS PRN
Qty: 30 TABLET | Refills: 1 | Status: SHIPPED | OUTPATIENT
Start: 2021-01-28 | End: 2021-02-27

## 2021-01-28 RX ORDER — IBUPROFEN 600 MG/1
600 TABLET ORAL EVERY 6 HOURS PRN
Qty: 30 TABLET | Refills: 1 | Status: SHIPPED | OUTPATIENT
Start: 2021-01-28 | End: 2021-01-28 | Stop reason: CLARIF

## 2021-01-29 ENCOUNTER — CLINICAL SUPPORT (OUTPATIENT)
Dept: REHABILITATION | Facility: HOSPITAL | Age: 56
End: 2021-01-29
Attending: FAMILY MEDICINE
Payer: COMMERCIAL

## 2021-01-29 DIAGNOSIS — R29.898 RIGHT LEG WEAKNESS: ICD-10-CM

## 2021-01-29 DIAGNOSIS — M25.571 ACUTE RIGHT ANKLE PAIN: ICD-10-CM

## 2021-01-29 DIAGNOSIS — M25.471 EDEMA OF RIGHT ANKLE: ICD-10-CM

## 2021-01-29 DIAGNOSIS — M25.671 ANKLE STIFFNESS, RIGHT: ICD-10-CM

## 2021-01-29 PROCEDURE — 97110 THERAPEUTIC EXERCISES: CPT | Mod: PO,CQ

## 2021-01-31 ENCOUNTER — PATIENT OUTREACH (OUTPATIENT)
Dept: ADMINISTRATIVE | Facility: OTHER | Age: 56
End: 2021-01-31

## 2021-02-01 ENCOUNTER — TELEPHONE (OUTPATIENT)
Dept: URGENT CARE | Facility: CLINIC | Age: 56
End: 2021-02-01

## 2021-02-01 ENCOUNTER — CLINICAL SUPPORT (OUTPATIENT)
Dept: REHABILITATION | Facility: HOSPITAL | Age: 56
End: 2021-02-01
Attending: FAMILY MEDICINE
Payer: COMMERCIAL

## 2021-02-01 DIAGNOSIS — M25.571 ACUTE RIGHT ANKLE PAIN: ICD-10-CM

## 2021-02-01 DIAGNOSIS — S93.601D SPRAIN OF RIGHT FOOT, SUBSEQUENT ENCOUNTER: ICD-10-CM

## 2021-02-01 DIAGNOSIS — M25.471 EDEMA OF RIGHT ANKLE: ICD-10-CM

## 2021-02-01 DIAGNOSIS — R29.898 RIGHT LEG WEAKNESS: ICD-10-CM

## 2021-02-01 DIAGNOSIS — S93.401D SPRAIN OF RIGHT ANKLE, UNSPECIFIED LIGAMENT, SUBSEQUENT ENCOUNTER: Primary | ICD-10-CM
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
## 2021-02-01 DIAGNOSIS — M67.88 ACHILLES TENDONOSIS: ICD-10-CM

## 2021-02-01 DIAGNOSIS — M25.671 ANKLE STIFFNESS, RIGHT: ICD-10-CM

## 2021-02-01 PROCEDURE — 97140 MANUAL THERAPY 1/> REGIONS: CPT | Mod: PO

## 2021-02-01 PROCEDURE — 97110 THERAPEUTIC EXERCISES: CPT | Mod: PO

## 2021-02-08 ENCOUNTER — HOSPITAL ENCOUNTER (OUTPATIENT)
Dept: RADIOLOGY | Facility: HOSPITAL | Age: 56
Discharge: HOME | End: 2021-02-08
Attending: PHYSICIAN ASSISTANT
Payer: COMMERCIAL

## 2021-02-08 DIAGNOSIS — S93.601D SPRAIN OF RIGHT FOOT, SUBSEQUENT ENCOUNTER: ICD-10-CM
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
## 2021-02-08 DIAGNOSIS — M67.88 ACHILLES TENDONOSIS: ICD-10-CM

## 2021-02-08 DIAGNOSIS — S93.401D SPRAIN OF RIGHT ANKLE, UNSPECIFIED LIGAMENT, SUBSEQUENT ENCOUNTER: ICD-10-CM

## 2021-02-08 PROCEDURE — 73700 CT LOWER EXTREMITY W/O DYE: CPT | Mod: TC,RT

## 2021-02-08 PROCEDURE — 73700 CT ANKLE (INCLUDING HINDFOOT) WITHOUT CONTRAST RIGHT: ICD-10-PCS | Mod: 26,RT,, | Performed by: RADIOLOGY

## 2021-02-08 PROCEDURE — 73700 CT LOWER EXTREMITY W/O DYE: CPT | Mod: 26,RT,, | Performed by: RADIOLOGY

## 2021-02-09 ENCOUNTER — TELEPHONE (OUTPATIENT)
Dept: URGENT CARE | Facility: CLINIC | Age: 56
End: 2021-02-09

## 2021-02-09 DIAGNOSIS — S93.401D SPRAIN OF RIGHT ANKLE, UNSPECIFIED LIGAMENT, SUBSEQUENT ENCOUNTER: Primary | ICD-10-CM
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
## 2021-02-09 DIAGNOSIS — M67.88 ACHILLES TENDONOSIS: ICD-10-CM

## 2021-02-09 DIAGNOSIS — S93.601D SPRAIN OF RIGHT FOOT, SUBSEQUENT ENCOUNTER: ICD-10-CM

## 2021-02-11 ENCOUNTER — OFFICE VISIT (OUTPATIENT)
Dept: URGENT CARE | Facility: CLINIC | Age: 56
End: 2021-02-11
Payer: COMMERCIAL

## 2021-02-11 DIAGNOSIS — M67.88 ACHILLES TENDONOSIS: ICD-10-CM

## 2021-02-11 DIAGNOSIS — S93.401D SPRAIN OF RIGHT ANKLE, UNSPECIFIED LIGAMENT, SUBSEQUENT ENCOUNTER: Primary | ICD-10-CM

## 2021-02-11 DIAGNOSIS — S82.391D CLOSED FRACTURE OF POSTERIOR MALLEOLUS OF RIGHT TIBIA WITH ROUTINE HEALING, SUBSEQUENT ENCOUNTER: ICD-10-CM
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
## 2021-02-11 DIAGNOSIS — S93.601D SPRAIN OF RIGHT FOOT, SUBSEQUENT ENCOUNTER: ICD-10-CM

## 2021-02-11 PROCEDURE — 99213 PR OFFICE/OUTPT VISIT, EST, LEVL III, 20-29 MIN: ICD-10-PCS | Mod: S$GLB,,, | Performed by: PHYSICIAN ASSISTANT

## 2021-02-11 PROCEDURE — 99213 OFFICE O/P EST LOW 20 MIN: CPT | Mod: S$GLB,,, | Performed by: PHYSICIAN ASSISTANT

## 2021-02-11 RX ORDER — NAPROXEN 500 MG/1
500 TABLET ORAL 2 TIMES DAILY WITH MEALS
Qty: 30 TABLET | Refills: 1 | Status: SHIPPED | OUTPATIENT
Start: 2021-02-11 | End: 2021-04-01

## 2021-02-18 ENCOUNTER — OFFICE VISIT (OUTPATIENT)
Dept: URGENT CARE | Facility: CLINIC | Age: 56
End: 2021-02-18
Payer: COMMERCIAL

## 2021-02-18 DIAGNOSIS — S82.391D CLOSED FRACTURE OF POSTERIOR MALLEOLUS OF RIGHT TIBIA WITH ROUTINE HEALING, SUBSEQUENT ENCOUNTER: ICD-10-CM
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
## 2021-02-18 DIAGNOSIS — S93.601D SPRAIN OF RIGHT FOOT, SUBSEQUENT ENCOUNTER: ICD-10-CM

## 2021-02-18 DIAGNOSIS — M67.88 ACHILLES TENDONOSIS: ICD-10-CM

## 2021-02-18 DIAGNOSIS — S93.401D SPRAIN OF RIGHT ANKLE, UNSPECIFIED LIGAMENT, SUBSEQUENT ENCOUNTER: Primary | ICD-10-CM

## 2021-02-18 PROCEDURE — 99214 PR OFFICE/OUTPT VISIT, EST, LEVL IV, 30-39 MIN: ICD-10-PCS | Mod: S$GLB,,, | Performed by: EMERGENCY MEDICINE

## 2021-02-18 PROCEDURE — 99214 OFFICE O/P EST MOD 30 MIN: CPT | Mod: S$GLB,,, | Performed by: EMERGENCY MEDICINE

## 2021-02-25 ENCOUNTER — OFFICE VISIT (OUTPATIENT)
Dept: PODIATRY | Facility: CLINIC | Age: 56
End: 2021-02-25
Payer: COMMERCIAL

## 2021-02-25 VITALS
BODY MASS INDEX: 31.46 KG/M2 | SYSTOLIC BLOOD PRESSURE: 137 MMHG | DIASTOLIC BLOOD PRESSURE: 87 MMHG | HEIGHT: 59 IN | HEART RATE: 74 BPM | WEIGHT: 156.06 LBS

## 2021-02-25 DIAGNOSIS — S93.401S MODERATE RIGHT ANKLE SPRAIN, SEQUELA: ICD-10-CM

## 2021-02-25 DIAGNOSIS — M89.9 OSTEOCHONDRAL LESION OF TALAR DOME: ICD-10-CM

## 2021-02-25 DIAGNOSIS — M94.9 OSTEOCHONDRAL LESION OF TALAR DOME: ICD-10-CM

## 2021-02-25 DIAGNOSIS — S82.391A: Primary | ICD-10-CM

## 2021-02-25 PROCEDURE — 99203 OFFICE O/P NEW LOW 30 MIN: CPT | Mod: S$GLB,,, | Performed by: PODIATRIST

## 2021-02-25 PROCEDURE — 99203 PR OFFICE/OUTPT VISIT, NEW, LEVL III, 30-44 MIN: ICD-10-PCS | Mod: S$GLB,,, | Performed by: PODIATRIST

## 2021-02-25 PROCEDURE — 99999 PR PBB SHADOW E&M-EST. PATIENT-LVL III: CPT | Mod: PBBFAC,,, | Performed by: PODIATRIST

## 2021-02-25 PROCEDURE — 99999 PR PBB SHADOW E&M-EST. PATIENT-LVL III: ICD-10-PCS | Mod: PBBFAC,,, | Performed by: PODIATRIST

## 2021-03-31 ENCOUNTER — PATIENT MESSAGE (OUTPATIENT)
Dept: ADMINISTRATIVE | Facility: HOSPITAL | Age: 56
End: 2021-03-31

## 2021-04-01 ENCOUNTER — HOSPITAL ENCOUNTER (OUTPATIENT)
Dept: RADIOLOGY | Facility: HOSPITAL | Age: 56
Discharge: HOME | End: 2021-04-01
Attending: FAMILY MEDICINE
Payer: COMMERCIAL

## 2021-04-01 ENCOUNTER — OFFICE VISIT (OUTPATIENT)
Dept: FAMILY MEDICINE | Facility: CLINIC | Age: 56
End: 2021-04-01
Payer: COMMERCIAL

## 2021-04-01 VITALS
SYSTOLIC BLOOD PRESSURE: 144 MMHG | BODY MASS INDEX: 32.82 KG/M2 | WEIGHT: 162.81 LBS | HEART RATE: 79 BPM | TEMPERATURE: 98 F | DIASTOLIC BLOOD PRESSURE: 80 MMHG | OXYGEN SATURATION: 95 % | HEIGHT: 59 IN

## 2021-04-01 DIAGNOSIS — M25.561 ACUTE PAIN OF RIGHT KNEE: ICD-10-CM

## 2021-04-01 DIAGNOSIS — H10.13 ALLERGIC CONJUNCTIVITIS OF BOTH EYES: ICD-10-CM

## 2021-04-01 DIAGNOSIS — M25.561 ACUTE PAIN OF RIGHT KNEE: Primary | ICD-10-CM

## 2021-04-01 DIAGNOSIS — Z12.11 SCREENING FOR COLON CANCER: ICD-10-CM

## 2021-04-01 DIAGNOSIS — I10 ESSENTIAL HYPERTENSION: ICD-10-CM

## 2021-04-01 DIAGNOSIS — Z12.31 ENCOUNTER FOR SCREENING MAMMOGRAM FOR BREAST CANCER: ICD-10-CM

## 2021-04-01 PROCEDURE — 99999 PR PBB SHADOW E&M-EST. PATIENT-LVL IV: CPT | Mod: PBBFAC,,, | Performed by: FAMILY MEDICINE

## 2021-04-01 PROCEDURE — 3079F PR MOST RECENT DIASTOLIC BLOOD PRESSURE 80-89 MM HG: ICD-10-PCS | Mod: CPTII,S$GLB,, | Performed by: FAMILY MEDICINE

## 2021-04-01 PROCEDURE — 3077F SYST BP >= 140 MM HG: CPT | Mod: CPTII,S$GLB,, | Performed by: FAMILY MEDICINE

## 2021-04-01 PROCEDURE — 3008F BODY MASS INDEX DOCD: CPT | Mod: CPTII,S$GLB,, | Performed by: FAMILY MEDICINE

## 2021-04-01 PROCEDURE — 3008F PR BODY MASS INDEX (BMI) DOCUMENTED: ICD-10-PCS | Mod: CPTII,S$GLB,, | Performed by: FAMILY MEDICINE

## 2021-04-01 PROCEDURE — 1126F PR PAIN SEVERITY QUANTIFIED, NO PAIN PRESENT: ICD-10-PCS | Mod: S$GLB,,, | Performed by: FAMILY MEDICINE

## 2021-04-01 PROCEDURE — 3077F PR MOST RECENT SYSTOLIC BLOOD PRESSURE >= 140 MM HG: ICD-10-PCS | Mod: CPTII,S$GLB,, | Performed by: FAMILY MEDICINE

## 2021-04-01 PROCEDURE — 99999 PR PBB SHADOW E&M-EST. PATIENT-LVL IV: ICD-10-PCS | Mod: PBBFAC,,, | Performed by: FAMILY MEDICINE

## 2021-04-01 PROCEDURE — 1126F AMNT PAIN NOTED NONE PRSNT: CPT | Mod: S$GLB,,, | Performed by: FAMILY MEDICINE

## 2021-04-01 PROCEDURE — 73560 X-RAY EXAM OF KNEE 1 OR 2: CPT | Mod: 26,RT,, | Performed by: RADIOLOGY

## 2021-04-01 PROCEDURE — 99214 PR OFFICE/OUTPT VISIT, EST, LEVL IV, 30-39 MIN: ICD-10-PCS | Mod: S$GLB,,, | Performed by: FAMILY MEDICINE

## 2021-04-01 PROCEDURE — 73560 XR KNEE 1 OR 2 VIEW RIGHT: ICD-10-PCS | Mod: 26,RT,, | Performed by: RADIOLOGY

## 2021-04-01 PROCEDURE — 99214 OFFICE O/P EST MOD 30 MIN: CPT | Mod: S$GLB,,, | Performed by: FAMILY MEDICINE

## 2021-04-01 PROCEDURE — 73560 X-RAY EXAM OF KNEE 1 OR 2: CPT | Mod: TC,FY,PO,RT

## 2021-04-01 PROCEDURE — 3079F DIAST BP 80-89 MM HG: CPT | Mod: CPTII,S$GLB,, | Performed by: FAMILY MEDICINE

## 2021-04-01 RX ORDER — MELOXICAM 15 MG/1
15 TABLET ORAL DAILY
Qty: 30 TABLET | Refills: 5 | Status: SHIPPED | OUTPATIENT
Start: 2021-04-01 | End: 2021-05-01

## 2021-04-01 RX ORDER — OLOPATADINE HYDROCHLORIDE 1 MG/ML
1 SOLUTION/ DROPS OPHTHALMIC 2 TIMES DAILY
Qty: 5 ML | Refills: 2 | Status: SHIPPED | OUTPATIENT
Start: 2021-04-01 | End: 2022-04-01

## 2021-04-05 ENCOUNTER — TELEPHONE (OUTPATIENT)
Dept: FAMILY MEDICINE | Facility: CLINIC | Age: 56
End: 2021-04-05

## 2021-04-05 DIAGNOSIS — M25.561 ACUTE PAIN OF RIGHT KNEE: Primary | ICD-10-CM

## 2021-04-06 ENCOUNTER — PATIENT MESSAGE (OUTPATIENT)
Dept: ADMINISTRATIVE | Facility: HOSPITAL | Age: 56
End: 2021-04-06

## 2021-04-06 ENCOUNTER — TELEPHONE (OUTPATIENT)
Dept: FAMILY MEDICINE | Facility: CLINIC | Age: 56
End: 2021-04-06

## 2021-04-08 ENCOUNTER — PATIENT OUTREACH (OUTPATIENT)
Dept: ADMINISTRATIVE | Facility: OTHER | Age: 56
End: 2021-04-08

## 2021-04-13 DIAGNOSIS — M25.561 ACUTE PAIN OF RIGHT KNEE: Primary | ICD-10-CM

## 2021-04-22 ENCOUNTER — OFFICE VISIT (OUTPATIENT)
Dept: PODIATRY | Facility: CLINIC | Age: 56
End: 2021-04-22
Payer: COMMERCIAL

## 2021-04-22 ENCOUNTER — HOSPITAL ENCOUNTER (OUTPATIENT)
Dept: RADIOLOGY | Facility: HOSPITAL | Age: 56
Discharge: HOME | End: 2021-04-22
Attending: PODIATRIST
Payer: COMMERCIAL

## 2021-04-22 VITALS — WEIGHT: 162.94 LBS | BODY MASS INDEX: 32.85 KG/M2 | HEIGHT: 59 IN

## 2021-04-22 DIAGNOSIS — G89.29 CHRONIC PAIN OF RIGHT ANKLE: ICD-10-CM

## 2021-04-22 DIAGNOSIS — S82.391A: ICD-10-CM

## 2021-04-22 DIAGNOSIS — S93.401S MODERATE RIGHT ANKLE SPRAIN, SEQUELA: ICD-10-CM

## 2021-04-22 DIAGNOSIS — M94.9 OSTEOCHONDRAL LESION OF TALAR DOME: ICD-10-CM

## 2021-04-22 DIAGNOSIS — M89.9 OSTEOCHONDRAL LESION OF TALAR DOME: ICD-10-CM

## 2021-04-22 DIAGNOSIS — S82.391G: Primary | ICD-10-CM

## 2021-04-22 DIAGNOSIS — M62.81 MUSCLE WEAKNESS OF LOWER EXTREMITY: ICD-10-CM

## 2021-04-22 DIAGNOSIS — M25.571 CHRONIC PAIN OF RIGHT ANKLE: ICD-10-CM

## 2021-04-22 PROCEDURE — 99213 PR OFFICE/OUTPT VISIT, EST, LEVL III, 20-29 MIN: ICD-10-PCS | Mod: S$GLB,,, | Performed by: PODIATRIST

## 2021-04-22 PROCEDURE — 99213 OFFICE O/P EST LOW 20 MIN: CPT | Mod: S$GLB,,, | Performed by: PODIATRIST

## 2021-04-22 PROCEDURE — 99999 PR PBB SHADOW E&M-EST. PATIENT-LVL IV: ICD-10-PCS | Mod: PBBFAC,,, | Performed by: PODIATRIST

## 2021-04-22 PROCEDURE — 99999 PR PBB SHADOW E&M-EST. PATIENT-LVL IV: CPT | Mod: PBBFAC,,, | Performed by: PODIATRIST

## 2021-04-22 PROCEDURE — 73610 X-RAY EXAM OF ANKLE: CPT | Mod: TC,PN,RT

## 2021-04-22 PROCEDURE — 73610 XR ANKLE COMPLETE 3 VIEW RIGHT: ICD-10-PCS | Mod: 26,RT,, | Performed by: RADIOLOGY

## 2021-04-22 PROCEDURE — 73610 X-RAY EXAM OF ANKLE: CPT | Mod: 26,RT,, | Performed by: RADIOLOGY

## 2021-04-22 RX ORDER — DICLOFENAC SODIUM 10 MG/G
2 GEL TOPICAL 4 TIMES DAILY
Qty: 100 G | Refills: 5 | Status: SHIPPED | OUTPATIENT
Start: 2021-04-22 | End: 2021-05-02

## 2021-04-27 ENCOUNTER — TELEPHONE (OUTPATIENT)
Dept: PODIATRY | Facility: CLINIC | Age: 56
End: 2021-04-27

## 2021-04-28 ENCOUNTER — TELEPHONE (OUTPATIENT)
Dept: PODIATRY | Facility: CLINIC | Age: 56
End: 2021-04-28

## 2021-04-30 ENCOUNTER — PATIENT MESSAGE (OUTPATIENT)
Dept: ADMINISTRATIVE | Facility: HOSPITAL | Age: 56
End: 2021-04-30

## 2021-04-30 ENCOUNTER — PATIENT OUTREACH (OUTPATIENT)
Dept: ADMINISTRATIVE | Facility: HOSPITAL | Age: 56
End: 2021-04-30

## 2021-05-13 ENCOUNTER — HOSPITAL ENCOUNTER (OUTPATIENT)
Dept: RADIOLOGY | Facility: HOSPITAL | Age: 56
Discharge: HOME | End: 2021-05-13
Attending: PODIATRIST
Payer: COMMERCIAL

## 2021-05-13 DIAGNOSIS — S82.391G: ICD-10-CM

## 2021-05-13 PROCEDURE — 73700 CT LOWER EXTREMITY W/O DYE: CPT | Mod: TC,RT

## 2021-05-13 PROCEDURE — 73700 CT LOWER EXTREMITY W/O DYE: CPT | Mod: 26,RT,, | Performed by: RADIOLOGY

## 2021-05-13 PROCEDURE — 73700 CT ANKLE (INCLUDING HINDFOOT) WITHOUT CONTRAST RIGHT: ICD-10-PCS | Mod: 26,RT,, | Performed by: RADIOLOGY

## 2021-05-18 ENCOUNTER — PATIENT MESSAGE (OUTPATIENT)
Dept: FAMILY MEDICINE | Facility: CLINIC | Age: 56
End: 2021-05-18

## 2021-05-18 ENCOUNTER — TELEPHONE (OUTPATIENT)
Dept: FAMILY MEDICINE | Facility: CLINIC | Age: 56
End: 2021-05-18

## 2021-05-25 ENCOUNTER — PATIENT OUTREACH (OUTPATIENT)
Dept: ADMINISTRATIVE | Facility: OTHER | Age: 56
End: 2021-05-25

## 2021-05-27 ENCOUNTER — OFFICE VISIT (OUTPATIENT)
Dept: PODIATRY | Facility: CLINIC | Age: 56
End: 2021-05-27
Payer: COMMERCIAL

## 2021-05-27 VITALS — SYSTOLIC BLOOD PRESSURE: 150 MMHG | DIASTOLIC BLOOD PRESSURE: 86 MMHG | HEART RATE: 74 BPM

## 2021-05-27 DIAGNOSIS — M94.9 OSTEOCHONDRAL LESION OF TALAR DOME: ICD-10-CM

## 2021-05-27 DIAGNOSIS — S82.391G: ICD-10-CM

## 2021-05-27 DIAGNOSIS — G89.29 CHRONIC PAIN OF RIGHT ANKLE: Primary | ICD-10-CM

## 2021-05-27 DIAGNOSIS — G57.51 TARSAL TUNNEL SYNDROME, RIGHT: ICD-10-CM

## 2021-05-27 DIAGNOSIS — S93.401S MODERATE RIGHT ANKLE SPRAIN, SEQUELA: ICD-10-CM

## 2021-05-27 DIAGNOSIS — M25.571 CHRONIC PAIN OF RIGHT ANKLE: Primary | ICD-10-CM

## 2021-05-27 DIAGNOSIS — M89.9 OSTEOCHONDRAL LESION OF TALAR DOME: ICD-10-CM

## 2021-05-27 PROCEDURE — 99999 PR PBB SHADOW E&M-EST. PATIENT-LVL III: CPT | Mod: PBBFAC,,, | Performed by: PODIATRIST

## 2021-05-27 PROCEDURE — 99214 PR OFFICE/OUTPT VISIT, EST, LEVL IV, 30-39 MIN: ICD-10-PCS | Mod: S$GLB,,, | Performed by: PODIATRIST

## 2021-05-27 PROCEDURE — 99999 PR PBB SHADOW E&M-EST. PATIENT-LVL III: ICD-10-PCS | Mod: PBBFAC,,, | Performed by: PODIATRIST

## 2021-05-27 PROCEDURE — 99214 OFFICE O/P EST MOD 30 MIN: CPT | Mod: S$GLB,,, | Performed by: PODIATRIST

## 2021-06-02 RX ORDER — ALBUTEROL SULFATE 0.83 MG/ML
2.5 SOLUTION RESPIRATORY (INHALATION) EVERY 6 HOURS PRN
Qty: 1 BOX | Refills: 0 | Status: SHIPPED | OUTPATIENT
Start: 2021-06-02

## 2021-06-09 ENCOUNTER — OFFICE VISIT (OUTPATIENT)
Dept: FAMILY MEDICINE | Facility: CLINIC | Age: 56
End: 2021-06-09
Payer: COMMERCIAL

## 2021-06-09 VITALS
HEIGHT: 59 IN | DIASTOLIC BLOOD PRESSURE: 80 MMHG | OXYGEN SATURATION: 97 % | BODY MASS INDEX: 33.06 KG/M2 | SYSTOLIC BLOOD PRESSURE: 130 MMHG | HEART RATE: 86 BPM | TEMPERATURE: 98 F | WEIGHT: 164 LBS

## 2021-06-09 DIAGNOSIS — J45.20 MILD INTERMITTENT ASTHMA WITHOUT COMPLICATION: ICD-10-CM

## 2021-06-09 DIAGNOSIS — L25.9 CONTACT DERMATITIS, UNSPECIFIED CONTACT DERMATITIS TYPE, UNSPECIFIED TRIGGER: Primary | ICD-10-CM

## 2021-06-09 PROCEDURE — 3008F BODY MASS INDEX DOCD: CPT | Mod: CPTII,S$GLB,, | Performed by: FAMILY MEDICINE

## 2021-06-09 PROCEDURE — 1125F PR PAIN SEVERITY QUANTIFIED, PAIN PRESENT: ICD-10-PCS | Mod: S$GLB,,, | Performed by: FAMILY MEDICINE

## 2021-06-09 PROCEDURE — 99214 PR OFFICE/OUTPT VISIT, EST, LEVL IV, 30-39 MIN: ICD-10-PCS | Mod: S$GLB,,, | Performed by: FAMILY MEDICINE

## 2021-06-09 PROCEDURE — 99999 PR PBB SHADOW E&M-EST. PATIENT-LVL III: CPT | Mod: PBBFAC,,, | Performed by: FAMILY MEDICINE

## 2021-06-09 PROCEDURE — 1125F AMNT PAIN NOTED PAIN PRSNT: CPT | Mod: S$GLB,,, | Performed by: FAMILY MEDICINE

## 2021-06-09 PROCEDURE — 99214 OFFICE O/P EST MOD 30 MIN: CPT | Mod: S$GLB,,, | Performed by: FAMILY MEDICINE

## 2021-06-09 PROCEDURE — 99999 PR PBB SHADOW E&M-EST. PATIENT-LVL III: ICD-10-PCS | Mod: PBBFAC,,, | Performed by: FAMILY MEDICINE

## 2021-06-09 PROCEDURE — 3008F PR BODY MASS INDEX (BMI) DOCUMENTED: ICD-10-PCS | Mod: CPTII,S$GLB,, | Performed by: FAMILY MEDICINE

## 2021-06-09 RX ORDER — TRIAMCINOLONE ACETONIDE 1 MG/G
OINTMENT TOPICAL 2 TIMES DAILY
Qty: 30 G | Refills: 0 | Status: SHIPPED | OUTPATIENT
Start: 2021-06-09

## 2021-06-09 RX ORDER — ALBUTEROL SULFATE 90 UG/1
2 AEROSOL, METERED RESPIRATORY (INHALATION) EVERY 6 HOURS PRN
Qty: 18 G | Refills: 2 | Status: SHIPPED | OUTPATIENT
Start: 2021-06-09 | End: 2022-04-12 | Stop reason: SDUPTHER

## 2021-06-24 ENCOUNTER — TELEPHONE (OUTPATIENT)
Dept: ORTHOPEDICS | Facility: CLINIC | Age: 56
End: 2021-06-24

## 2021-06-25 ENCOUNTER — PATIENT OUTREACH (OUTPATIENT)
Dept: ADMINISTRATIVE | Facility: OTHER | Age: 56
End: 2021-06-25

## 2021-07-02 ENCOUNTER — TELEPHONE (OUTPATIENT)
Dept: PODIATRY | Facility: CLINIC | Age: 56
End: 2021-07-02

## 2021-07-06 ENCOUNTER — PATIENT MESSAGE (OUTPATIENT)
Dept: ADMINISTRATIVE | Facility: HOSPITAL | Age: 56
End: 2021-07-06

## 2021-07-06 ENCOUNTER — PROCEDURE VISIT (OUTPATIENT)
Dept: PHYSICAL MEDICINE AND REHAB | Facility: CLINIC | Age: 56
End: 2021-07-06
Payer: COMMERCIAL

## 2021-07-06 DIAGNOSIS — M94.9 OSTEOCHONDRAL LESION OF TALAR DOME: ICD-10-CM

## 2021-07-06 DIAGNOSIS — M25.571 CHRONIC PAIN OF RIGHT ANKLE: ICD-10-CM

## 2021-07-06 DIAGNOSIS — M89.9 OSTEOCHONDRAL LESION OF TALAR DOME: ICD-10-CM

## 2021-07-06 DIAGNOSIS — G57.51 TARSAL TUNNEL SYNDROME, RIGHT: ICD-10-CM

## 2021-07-06 DIAGNOSIS — G89.29 CHRONIC PAIN OF RIGHT ANKLE: ICD-10-CM

## 2021-07-06 PROCEDURE — 95910 NRV CNDJ TEST 7-8 STUDIES: CPT | Mod: S$GLB,,, | Performed by: PHYSICAL MEDICINE & REHABILITATION

## 2021-07-06 PROCEDURE — 95910 PR NERVE CONDUCTION STUDY; 7-8 STUDIES: ICD-10-PCS | Mod: S$GLB,,, | Performed by: PHYSICAL MEDICINE & REHABILITATION

## 2021-07-06 PROCEDURE — 95885 MUSC TST DONE W/NERV TST LIM: CPT | Mod: S$GLB,,, | Performed by: PHYSICAL MEDICINE & REHABILITATION

## 2021-07-06 PROCEDURE — 95885 PR MUSC TST DONE W/NERV TST LIM: ICD-10-PCS | Mod: S$GLB,,, | Performed by: PHYSICAL MEDICINE & REHABILITATION

## 2021-07-28 ENCOUNTER — PATIENT OUTREACH (OUTPATIENT)
Dept: ADMINISTRATIVE | Facility: OTHER | Age: 56
End: 2021-07-28

## 2021-07-29 ENCOUNTER — OFFICE VISIT (OUTPATIENT)
Dept: PODIATRY | Facility: CLINIC | Age: 56
End: 2021-07-29
Payer: COMMERCIAL

## 2021-07-29 VITALS
WEIGHT: 164 LBS | HEART RATE: 78 BPM | HEIGHT: 59 IN | SYSTOLIC BLOOD PRESSURE: 141 MMHG | BODY MASS INDEX: 33.06 KG/M2 | DIASTOLIC BLOOD PRESSURE: 90 MMHG

## 2021-07-29 DIAGNOSIS — S93.401S MODERATE RIGHT ANKLE SPRAIN, SEQUELA: ICD-10-CM

## 2021-07-29 DIAGNOSIS — G89.29 CHRONIC PAIN OF RIGHT ANKLE: Primary | ICD-10-CM

## 2021-07-29 DIAGNOSIS — M25.571 CHRONIC PAIN OF RIGHT ANKLE: Primary | ICD-10-CM

## 2021-07-29 DIAGNOSIS — M89.9 OSTEOCHONDRAL LESION OF TALAR DOME: ICD-10-CM

## 2021-07-29 DIAGNOSIS — M94.9 OSTEOCHONDRAL LESION OF TALAR DOME: ICD-10-CM

## 2021-07-29 PROCEDURE — 99213 OFFICE O/P EST LOW 20 MIN: CPT | Mod: S$GLB,,, | Performed by: PODIATRIST

## 2021-07-29 PROCEDURE — 99999 PR PBB SHADOW E&M-EST. PATIENT-LVL III: ICD-10-PCS | Mod: PBBFAC,,, | Performed by: PODIATRIST

## 2021-07-29 PROCEDURE — 99213 PR OFFICE/OUTPT VISIT, EST, LEVL III, 20-29 MIN: ICD-10-PCS | Mod: S$GLB,,, | Performed by: PODIATRIST

## 2021-07-29 PROCEDURE — 99999 PR PBB SHADOW E&M-EST. PATIENT-LVL III: CPT | Mod: PBBFAC,,, | Performed by: PODIATRIST

## 2021-08-19 ENCOUNTER — TELEPHONE (OUTPATIENT)
Dept: PODIATRY | Facility: CLINIC | Age: 56
End: 2021-08-19

## 2021-08-20 ENCOUNTER — TELEPHONE (OUTPATIENT)
Dept: PODIATRY | Facility: CLINIC | Age: 56
End: 2021-08-20

## 2021-08-25 ENCOUNTER — OFFICE VISIT (OUTPATIENT)
Dept: PODIATRY | Facility: CLINIC | Age: 56
End: 2021-08-25
Payer: COMMERCIAL

## 2021-08-25 VITALS — WEIGHT: 164 LBS | BODY MASS INDEX: 33.06 KG/M2 | HEIGHT: 59 IN

## 2021-08-25 DIAGNOSIS — M76.821 POSTERIOR TIBIAL TENDONITIS, RIGHT: ICD-10-CM

## 2021-08-25 DIAGNOSIS — S93.401S MODERATE RIGHT ANKLE SPRAIN, SEQUELA: ICD-10-CM

## 2021-08-25 DIAGNOSIS — M89.9 OSTEOCHONDRAL LESION OF TALAR DOME: ICD-10-CM

## 2021-08-25 DIAGNOSIS — M94.9 OSTEOCHONDRAL LESION OF TALAR DOME: ICD-10-CM

## 2021-08-25 DIAGNOSIS — M12.571 TRAUMATIC ARTHRITIS OF ANKLE, RIGHT: ICD-10-CM

## 2021-08-25 DIAGNOSIS — G89.29 CHRONIC PAIN OF RIGHT ANKLE: Primary | ICD-10-CM

## 2021-08-25 DIAGNOSIS — M25.571 CHRONIC PAIN OF RIGHT ANKLE: Primary | ICD-10-CM

## 2021-08-25 PROCEDURE — 99213 PR OFFICE/OUTPT VISIT, EST, LEVL III, 20-29 MIN: ICD-10-PCS | Mod: S$GLB,,, | Performed by: PODIATRIST

## 2021-08-25 PROCEDURE — 99999 PR PBB SHADOW E&M-EST. PATIENT-LVL III: ICD-10-PCS | Mod: PBBFAC,,, | Performed by: PODIATRIST

## 2021-08-25 PROCEDURE — 99213 OFFICE O/P EST LOW 20 MIN: CPT | Mod: S$GLB,,, | Performed by: PODIATRIST

## 2021-08-25 PROCEDURE — 99999 PR PBB SHADOW E&M-EST. PATIENT-LVL III: CPT | Mod: PBBFAC,,, | Performed by: PODIATRIST

## 2021-09-10 ENCOUNTER — TELEPHONE (OUTPATIENT)
Dept: PODIATRY | Facility: CLINIC | Age: 56
End: 2021-09-10

## 2021-09-10 RX ORDER — LEG BRACE
EACH MISCELLANEOUS
Qty: 1 EACH | Refills: 0 | Status: SHIPPED | OUTPATIENT
Start: 2021-09-10 | End: 2021-09-10

## 2021-09-10 RX ORDER — LEG BRACE
EACH MISCELLANEOUS
Qty: 1 EACH | Refills: 0 | Status: SHIPPED | OUTPATIENT
Start: 2021-09-10

## 2021-09-20 ENCOUNTER — OFFICE VISIT (OUTPATIENT)
Dept: URGENT CARE | Facility: CLINIC | Age: 56
End: 2021-09-20
Payer: COMMERCIAL

## 2021-09-20 VITALS
TEMPERATURE: 98 F | DIASTOLIC BLOOD PRESSURE: 77 MMHG | SYSTOLIC BLOOD PRESSURE: 118 MMHG | HEART RATE: 88 BPM | WEIGHT: 164 LBS | BODY MASS INDEX: 33.06 KG/M2 | HEIGHT: 59 IN | OXYGEN SATURATION: 96 % | RESPIRATION RATE: 16 BRPM

## 2021-09-20 DIAGNOSIS — S09.90XA INJURY OF HEAD, INITIAL ENCOUNTER: ICD-10-CM

## 2021-09-20 DIAGNOSIS — M79.605 PAIN OF LEFT LOWER EXTREMITY: ICD-10-CM

## 2021-09-20 DIAGNOSIS — S40.012A CONTUSION OF LEFT SHOULDER, INITIAL ENCOUNTER: Primary | ICD-10-CM

## 2021-09-20 DIAGNOSIS — M25.512 ACUTE PAIN OF LEFT SHOULDER: ICD-10-CM

## 2021-09-20 PROCEDURE — 99214 PR OFFICE/OUTPT VISIT, EST, LEVL IV, 30-39 MIN: ICD-10-PCS | Mod: 25,S$GLB,, | Performed by: NURSE PRACTITIONER

## 2021-09-20 PROCEDURE — 73552 X-RAY EXAM OF FEMUR 2/>: CPT | Mod: LT,S$GLB,, | Performed by: RADIOLOGY

## 2021-09-20 PROCEDURE — 1159F PR MEDICATION LIST DOCUMENTED IN MEDICAL RECORD: ICD-10-PCS | Mod: CPTII,S$GLB,, | Performed by: NURSE PRACTITIONER

## 2021-09-20 PROCEDURE — 3078F DIAST BP <80 MM HG: CPT | Mod: CPTII,S$GLB,, | Performed by: NURSE PRACTITIONER

## 2021-09-20 PROCEDURE — 1160F RVW MEDS BY RX/DR IN RCRD: CPT | Mod: CPTII,S$GLB,, | Performed by: NURSE PRACTITIONER

## 2021-09-20 PROCEDURE — 73030 XR SHOULDER TRAUMA 3 VIEW LEFT: ICD-10-PCS | Mod: LT,S$GLB,, | Performed by: RADIOLOGY

## 2021-09-20 PROCEDURE — 1160F PR REVIEW ALL MEDS BY PRESCRIBER/CLIN PHARMACIST DOCUMENTED: ICD-10-PCS | Mod: CPTII,S$GLB,, | Performed by: NURSE PRACTITIONER

## 2021-09-20 PROCEDURE — 3008F PR BODY MASS INDEX (BMI) DOCUMENTED: ICD-10-PCS | Mod: CPTII,S$GLB,, | Performed by: NURSE PRACTITIONER

## 2021-09-20 PROCEDURE — 3074F SYST BP LT 130 MM HG: CPT | Mod: CPTII,S$GLB,, | Performed by: NURSE PRACTITIONER

## 2021-09-20 PROCEDURE — 99214 OFFICE O/P EST MOD 30 MIN: CPT | Mod: 25,S$GLB,, | Performed by: NURSE PRACTITIONER

## 2021-09-20 PROCEDURE — 1159F MED LIST DOCD IN RCRD: CPT | Mod: CPTII,S$GLB,, | Performed by: NURSE PRACTITIONER

## 2021-09-20 PROCEDURE — 3008F BODY MASS INDEX DOCD: CPT | Mod: CPTII,S$GLB,, | Performed by: NURSE PRACTITIONER

## 2021-09-20 PROCEDURE — 73030 X-RAY EXAM OF SHOULDER: CPT | Mod: LT,S$GLB,, | Performed by: RADIOLOGY

## 2021-09-20 PROCEDURE — 96372 PR INJECTION,THERAP/PROPH/DIAG2ST, IM OR SUBCUT: ICD-10-PCS | Mod: S$GLB,,, | Performed by: FAMILY MEDICINE

## 2021-09-20 PROCEDURE — 96372 THER/PROPH/DIAG INJ SC/IM: CPT | Mod: S$GLB,,, | Performed by: FAMILY MEDICINE

## 2021-09-20 PROCEDURE — 3078F PR MOST RECENT DIASTOLIC BLOOD PRESSURE < 80 MM HG: ICD-10-PCS | Mod: CPTII,S$GLB,, | Performed by: NURSE PRACTITIONER

## 2021-09-20 PROCEDURE — 73552 XR FEMUR 2 VIEW LEFT: ICD-10-PCS | Mod: LT,S$GLB,, | Performed by: RADIOLOGY

## 2021-09-20 PROCEDURE — 3074F PR MOST RECENT SYSTOLIC BLOOD PRESSURE < 130 MM HG: ICD-10-PCS | Mod: CPTII,S$GLB,, | Performed by: NURSE PRACTITIONER

## 2021-09-20 RX ORDER — KETOROLAC TROMETHAMINE 30 MG/ML
15 INJECTION, SOLUTION INTRAMUSCULAR; INTRAVENOUS
Status: COMPLETED | OUTPATIENT
Start: 2021-09-20 | End: 2021-09-20

## 2021-09-20 RX ORDER — METHOCARBAMOL 500 MG/1
500 TABLET, FILM COATED ORAL 3 TIMES DAILY PRN
Qty: 21 TABLET | Refills: 0 | Status: SHIPPED | OUTPATIENT
Start: 2021-09-20

## 2021-09-20 RX ORDER — IBUPROFEN 600 MG/1
600 TABLET ORAL 3 TIMES DAILY PRN
Qty: 30 TABLET | Refills: 0 | Status: SHIPPED | OUTPATIENT
Start: 2021-09-20 | End: 2022-08-29

## 2021-09-20 RX ADMIN — KETOROLAC TROMETHAMINE 15 MG: 30 INJECTION, SOLUTION INTRAMUSCULAR; INTRAVENOUS at 03:09

## 2021-09-29 ENCOUNTER — PATIENT OUTREACH (OUTPATIENT)
Dept: ADMINISTRATIVE | Facility: OTHER | Age: 56
End: 2021-09-29

## 2021-10-04 ENCOUNTER — PATIENT MESSAGE (OUTPATIENT)
Dept: ADMINISTRATIVE | Facility: HOSPITAL | Age: 56
End: 2021-10-04

## 2021-11-05 ENCOUNTER — OFFICE VISIT (OUTPATIENT)
Dept: URGENT CARE | Facility: CLINIC | Age: 56
End: 2021-11-05
Payer: COMMERCIAL

## 2021-11-05 DIAGNOSIS — N39.0 URINARY TRACT INFECTION WITH HEMATURIA, SITE UNSPECIFIED: ICD-10-CM

## 2021-11-05 DIAGNOSIS — R31.9 URINARY TRACT INFECTION WITH HEMATURIA, SITE UNSPECIFIED: ICD-10-CM
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
## 2021-11-05 DIAGNOSIS — S30.1XXA CONTUSION OF ABDOMINAL WALL, INITIAL ENCOUNTER: Primary | ICD-10-CM

## 2021-11-05 LAB
BILIRUB UR QL STRIP: NEGATIVE
GLUCOSE UR QL STRIP: NEGATIVE
KETONES UR QL STRIP: NEGATIVE
LEUKOCYTE ESTERASE UR QL STRIP: NEGATIVE
PH, POC UA: 6 (ref 5–8)
POC BLOOD, URINE: POSITIVE
POC NITRATES, URINE: POSITIVE
PROT UR QL STRIP: NEGATIVE
SP GR UR STRIP: 1.02 (ref 1–1.03)
UROBILINOGEN UR STRIP-ACNC: NORMAL (ref 0.1–1.1)

## 2021-11-05 PROCEDURE — 81003 POCT URINALYSIS, DIPSTICK, AUTOMATED, W/O SCOPE: ICD-10-PCS | Mod: QW,S$GLB,, | Performed by: PHYSICIAN ASSISTANT

## 2021-11-05 PROCEDURE — 81003 URINALYSIS AUTO W/O SCOPE: CPT | Mod: QW,S$GLB,, | Performed by: PHYSICIAN ASSISTANT

## 2021-11-05 PROCEDURE — 99213 PR OFFICE/OUTPT VISIT, EST, LEVL III, 20-29 MIN: ICD-10-PCS | Mod: 25,S$GLB,, | Performed by: PHYSICIAN ASSISTANT

## 2021-11-05 PROCEDURE — 99213 OFFICE O/P EST LOW 20 MIN: CPT | Mod: 25,S$GLB,, | Performed by: PHYSICIAN ASSISTANT

## 2021-11-05 RX ORDER — SULFAMETHOXAZOLE AND TRIMETHOPRIM 800; 160 MG/1; MG/1
1 TABLET ORAL 2 TIMES DAILY
Qty: 10 TABLET | Refills: 0 | Status: SHIPPED | OUTPATIENT
Start: 2021-11-05 | End: 2021-11-10

## 2021-11-11 ENCOUNTER — OFFICE VISIT (OUTPATIENT)
Dept: URGENT CARE | Facility: CLINIC | Age: 56
End: 2021-11-11
Payer: OTHER MISCELLANEOUS

## 2021-11-11 DIAGNOSIS — S30.1XXD CONTUSION OF ABDOMINAL WALL, SUBSEQUENT ENCOUNTER: Primary | ICD-10-CM
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
## 2021-11-11 PROCEDURE — 99213 OFFICE O/P EST LOW 20 MIN: CPT | Mod: S$GLB,,, | Performed by: PHYSICIAN ASSISTANT

## 2021-11-11 PROCEDURE — 99213 PR OFFICE/OUTPT VISIT, EST, LEVL III, 20-29 MIN: ICD-10-PCS | Mod: S$GLB,,, | Performed by: PHYSICIAN ASSISTANT

## 2021-12-08 ENCOUNTER — PATIENT MESSAGE (OUTPATIENT)
Dept: ADMINISTRATIVE | Facility: HOSPITAL | Age: 56
End: 2021-12-08
Payer: COMMERCIAL

## 2022-01-19 ENCOUNTER — PATIENT OUTREACH (OUTPATIENT)
Dept: ADMINISTRATIVE | Facility: OTHER | Age: 57
End: 2022-01-19
Payer: COMMERCIAL

## 2022-01-19 NOTE — PROGRESS NOTES
Health Maintenance Due   Topic Date Due    Colorectal Cancer Screening  Never done    COVID-19 Vaccine (1) Never done    Pneumococcal Vaccines (Age 0-64) (1 of 2 - PPSV23) Never done    HIV Screening  Never done    TETANUS VACCINE  Never done    Mammogram  Never done    Shingles Vaccine (1 of 2) Never done    Influenza Vaccine (1) 09/01/2021     Updates were requested from care everywhere.  Chart was reviewed for overdue Proactive [facility]ters (NUVIA) topics (CRS, Breast Cancer Screening, Eye exam)  Health Maintenance has been updated.  LINKS immunization registry triggered.  Immunizations were reconciled.

## 2022-02-17 ENCOUNTER — TELEPHONE (OUTPATIENT)
Dept: PODIATRY | Facility: CLINIC | Age: 57
End: 2022-02-17
Payer: COMMERCIAL

## 2022-02-17 NOTE — TELEPHONE ENCOUNTER
Spoke with Ms. June to offer earlier appt time with Dr. Macario tomorrow. Appt time of 9:45 am accepted. No other needs voiced.

## 2022-03-17 ENCOUNTER — PATIENT OUTREACH (OUTPATIENT)
Dept: ADMINISTRATIVE | Facility: OTHER | Age: 57
End: 2022-03-17
Payer: COMMERCIAL

## 2022-04-12 ENCOUNTER — OFFICE VISIT (OUTPATIENT)
Dept: URGENT CARE | Facility: CLINIC | Age: 57
End: 2022-04-12
Payer: COMMERCIAL

## 2022-04-12 VITALS
WEIGHT: 164 LBS | TEMPERATURE: 98 F | RESPIRATION RATE: 18 BRPM | OXYGEN SATURATION: 95 % | HEIGHT: 59 IN | BODY MASS INDEX: 33.06 KG/M2 | HEART RATE: 74 BPM | SYSTOLIC BLOOD PRESSURE: 150 MMHG | DIASTOLIC BLOOD PRESSURE: 90 MMHG

## 2022-04-12 DIAGNOSIS — R05.9 COUGH: ICD-10-CM

## 2022-04-12 DIAGNOSIS — J45.20 MILD INTERMITTENT ASTHMA WITHOUT COMPLICATION: ICD-10-CM

## 2022-04-12 DIAGNOSIS — J40 BRONCHITIS: Primary | ICD-10-CM

## 2022-04-12 PROCEDURE — 3080F DIAST BP >= 90 MM HG: CPT | Mod: CPTII,S$GLB,, | Performed by: PHYSICIAN ASSISTANT

## 2022-04-12 PROCEDURE — 94640 PR INHAL RX, AIRWAY OBST/DX SPUTUM INDUCT: ICD-10-PCS | Mod: S$GLB,,, | Performed by: FAMILY MEDICINE

## 2022-04-12 PROCEDURE — 3080F PR MOST RECENT DIASTOLIC BLOOD PRESSURE >= 90 MM HG: ICD-10-PCS | Mod: CPTII,S$GLB,, | Performed by: PHYSICIAN ASSISTANT

## 2022-04-12 PROCEDURE — 99214 PR OFFICE/OUTPT VISIT, EST, LEVL IV, 30-39 MIN: ICD-10-PCS | Mod: 25,S$GLB,, | Performed by: PHYSICIAN ASSISTANT

## 2022-04-12 PROCEDURE — 3077F PR MOST RECENT SYSTOLIC BLOOD PRESSURE >= 140 MM HG: ICD-10-PCS | Mod: CPTII,S$GLB,, | Performed by: PHYSICIAN ASSISTANT

## 2022-04-12 PROCEDURE — 3008F PR BODY MASS INDEX (BMI) DOCUMENTED: ICD-10-PCS | Mod: CPTII,S$GLB,, | Performed by: PHYSICIAN ASSISTANT

## 2022-04-12 PROCEDURE — 1160F RVW MEDS BY RX/DR IN RCRD: CPT | Mod: CPTII,S$GLB,, | Performed by: PHYSICIAN ASSISTANT

## 2022-04-12 PROCEDURE — 1159F MED LIST DOCD IN RCRD: CPT | Mod: CPTII,S$GLB,, | Performed by: PHYSICIAN ASSISTANT

## 2022-04-12 PROCEDURE — 3077F SYST BP >= 140 MM HG: CPT | Mod: CPTII,S$GLB,, | Performed by: PHYSICIAN ASSISTANT

## 2022-04-12 PROCEDURE — 1160F PR REVIEW ALL MEDS BY PRESCRIBER/CLIN PHARMACIST DOCUMENTED: ICD-10-PCS | Mod: CPTII,S$GLB,, | Performed by: PHYSICIAN ASSISTANT

## 2022-04-12 PROCEDURE — 3008F BODY MASS INDEX DOCD: CPT | Mod: CPTII,S$GLB,, | Performed by: PHYSICIAN ASSISTANT

## 2022-04-12 PROCEDURE — 99214 OFFICE O/P EST MOD 30 MIN: CPT | Mod: 25,S$GLB,, | Performed by: PHYSICIAN ASSISTANT

## 2022-04-12 PROCEDURE — 1159F PR MEDICATION LIST DOCUMENTED IN MEDICAL RECORD: ICD-10-PCS | Mod: CPTII,S$GLB,, | Performed by: PHYSICIAN ASSISTANT

## 2022-04-12 PROCEDURE — 94640 AIRWAY INHALATION TREATMENT: CPT | Mod: S$GLB,,, | Performed by: FAMILY MEDICINE

## 2022-04-12 RX ORDER — IPRATROPIUM BROMIDE 0.5 MG/2.5ML
0.5 SOLUTION RESPIRATORY (INHALATION)
Status: COMPLETED | OUTPATIENT
Start: 2022-04-12 | End: 2022-04-12

## 2022-04-12 RX ORDER — ALBUTEROL SULFATE 90 UG/1
2 AEROSOL, METERED RESPIRATORY (INHALATION) EVERY 6 HOURS PRN
Qty: 18 G | Refills: 2 | Status: SHIPPED | OUTPATIENT
Start: 2022-04-12

## 2022-04-12 RX ORDER — GUAIFENESIN AND DEXTROMETHORPHAN HYDROBROMIDE 1200; 60 MG/1; MG/1
1 TABLET, EXTENDED RELEASE ORAL 2 TIMES DAILY
Qty: 14 TABLET | Refills: 0 | Status: SHIPPED | OUTPATIENT
Start: 2022-04-12 | End: 2022-04-19

## 2022-04-12 RX ORDER — PREDNISONE 20 MG/1
20 TABLET ORAL DAILY
Qty: 4 TABLET | Refills: 0 | Status: SHIPPED | OUTPATIENT
Start: 2022-04-12

## 2022-04-12 RX ORDER — ALBUTEROL SULFATE 0.83 MG/ML
2.5 SOLUTION RESPIRATORY (INHALATION)
Status: COMPLETED | OUTPATIENT
Start: 2022-04-12 | End: 2022-04-12

## 2022-04-12 RX ORDER — PREDNISONE 20 MG/1
40 TABLET ORAL
Status: COMPLETED | OUTPATIENT
Start: 2022-04-12 | End: 2022-04-12

## 2022-04-12 RX ORDER — BENZONATATE 100 MG/1
100 CAPSULE ORAL 3 TIMES DAILY PRN
Qty: 21 CAPSULE | Refills: 0 | Status: SHIPPED | OUTPATIENT
Start: 2022-04-12 | End: 2022-04-22

## 2022-04-12 RX ORDER — ALBUTEROL SULFATE 0.83 MG/ML
2.5 SOLUTION RESPIRATORY (INHALATION) EVERY 6 HOURS PRN
Qty: 90 EACH | Refills: 2 | Status: SHIPPED | OUTPATIENT
Start: 2022-04-12 | End: 2023-04-12

## 2022-04-12 RX ADMIN — PREDNISONE 40 MG: 20 TABLET ORAL at 08:04

## 2022-04-12 RX ADMIN — IPRATROPIUM BROMIDE 0.5 MG: 0.5 SOLUTION RESPIRATORY (INHALATION) at 08:04

## 2022-04-12 RX ADMIN — ALBUTEROL SULFATE 2.5 MG: 0.83 SOLUTION RESPIRATORY (INHALATION) at 08:04

## 2022-04-12 NOTE — PROGRESS NOTES
"Subjective:       Patient ID: Rika June is a 56 y.o. female.    Vitals:  height is 4' 11" (1.499 m) and weight is 74.4 kg (164 lb). Her temperature is 98 °F (36.7 °C). Her blood pressure is 150/90 (abnormal) and her pulse is 74. Her respiration is 18 and oxygen saturation is 95%.     Chief Complaint: Cough    Pt states her bronchitis flared up yesterday night(4/11). Pt says it hasn't flared up in a while and she don't know what caused it. Pt says there no pain but she can feel the tightness in her chest from coughing.. Pt says she was wheezing a little last night. Pt says she didn't take any medication to help.     Cough  This is a new problem. The current episode started yesterday. The problem has been unchanged. The problem occurs every few minutes. The cough is non-productive. Associated symptoms include shortness of breath and wheezing. Pertinent negatives include no chest pain, ear congestion, fever, headaches, nasal congestion, postnasal drip or sore throat. Nothing aggravates the symptoms. She has tried nothing for the symptoms. The treatment provided no relief.       Constitution: Negative for fever.   HENT: Negative for congestion, postnasal drip and sore throat.    Cardiovascular: Negative for chest pain.   Respiratory: Positive for chest tightness, cough, shortness of breath and wheezing.    Gastrointestinal: Negative for abdominal pain.   Musculoskeletal: Negative for back pain.   Skin: Negative for erythema.   Neurological: Negative for headaches.       Objective:      Physical Exam   Constitutional: She is oriented to person, place, and time. She appears well-developed. She is cooperative.  Non-toxic appearance. She does not appear ill. No distress.   HENT:   Head: Normocephalic and atraumatic.   Ears:   Right Ear: Hearing, tympanic membrane, external ear and ear canal normal.   Left Ear: Hearing, tympanic membrane, external ear and ear canal normal.   Nose: Nose normal. No mucosal edema, " rhinorrhea or nasal deformity. No epistaxis. Right sinus exhibits no maxillary sinus tenderness and no frontal sinus tenderness. Left sinus exhibits no maxillary sinus tenderness and no frontal sinus tenderness.   Mouth/Throat: Uvula is midline, oropharynx is clear and moist and mucous membranes are normal. No trismus in the jaw. Normal dentition. No uvula swelling. No oropharyngeal exudate, posterior oropharyngeal edema or posterior oropharyngeal erythema.   Eyes: Conjunctivae and lids are normal. No scleral icterus.   Neck: Trachea normal and phonation normal. Neck supple. No edema present. No erythema present. No neck rigidity present.   Cardiovascular: Normal rate, regular rhythm, normal heart sounds and normal pulses.   Pulmonary/Chest: Effort normal. No stridor. No respiratory distress. She has no decreased breath sounds. She has no wheezes. She has rhonchi.    Comments: Good air movement with occasional rhonchi that mostly clear with coughing.    Abdominal: Normal appearance. Soft. There is abdominal tenderness.   Musculoskeletal: Normal range of motion.         General: No deformity. Normal range of motion.   Neurological: She is alert and oriented to person, place, and time. She exhibits normal muscle tone. Coordination normal.   Skin: Skin is warm, dry, intact, not diaphoretic and not pale. Capillary refill takes less than 2 seconds. No bruising and No erythema jaundice  Psychiatric: Her speech is normal and behavior is normal. Judgment and thought content normal.   Nursing note and vitals reviewed.  RE-EVALUATION FOLLOWING NEBULIZER TREATMENT  Good air movement throughout all lung fields no rales no rhonchi and no wheezing      Assessment:       1. Bronchitis    2. Mild intermittent asthma without complication    3. Cough          Plan:         Bronchitis  -     albuterol nebulizer solution 2.5 mg  -     ipratropium 0.02 % nebulizer solution 0.5 mg  -     predniSONE tablet 40 mg  -     albuterol  (PROVENTIL/VENTOLIN HFA) 90 mcg/actuation inhaler; Inhale 2 puffs into the lungs every 6 (six) hours as needed for Wheezing or Shortness of Breath.  Dispense: 18 g; Refill: 2  -     predniSONE (DELTASONE) 20 MG tablet; Take 1 tablet (20 mg total) by mouth once daily.  Dispense: 4 tablet; Refill: 0  -     dextromethorphan-guaiFENesin (MUCINEX DM) 60-1,200 mg per 12 hr tablet; Take 1 tablet by mouth 2 (two) times daily. for 7 days  Dispense: 14 tablet; Refill: 0  -     benzonatate (TESSALON) 100 MG capsule; Take 1 capsule (100 mg total) by mouth 3 (three) times daily as needed.  Dispense: 21 capsule; Refill: 0    Mild intermittent asthma without complication  -     albuterol (PROVENTIL/VENTOLIN HFA) 90 mcg/actuation inhaler; Inhale 2 puffs into the lungs every 6 (six) hours as needed for Wheezing or Shortness of Breath.  Dispense: 18 g; Refill: 2  -     predniSONE (DELTASONE) 20 MG tablet; Take 1 tablet (20 mg total) by mouth once daily.  Dispense: 4 tablet; Refill: 0    Cough  -     dextromethorphan-guaiFENesin (MUCINEX DM) 60-1,200 mg per 12 hr tablet; Take 1 tablet by mouth 2 (two) times daily. for 7 days  Dispense: 14 tablet; Refill: 0  -     benzonatate (TESSALON) 100 MG capsule; Take 1 capsule (100 mg total) by mouth 3 (three) times daily as needed.  Dispense: 21 capsule; Refill: 0    Follow up if symptoms worsen or fail to improve, for F/U with PCP or ED. There are no Patient Instructions on file for this visit.

## 2022-04-12 NOTE — LETTER
April 12, 2022      [facility]  [address]  [place] [address]  Phone: [phone]  Fax: [phone]       Patient: Rika June   YOB: 1965  Date of Visit: 04/12/2022    To Whom It May Concern:    Jory June  was at [facility] on 04/12/2022. The patient may return to work/school on April 15, 2022 with no restrictions. If you have any questions or concerns, or if I can be of further assistance, please do not hesitate to contact me.    Sincerely,    CECILIA Shi

## 2022-05-03 ENCOUNTER — OFFICE VISIT (OUTPATIENT)
Dept: URGENT CARE | Facility: CLINIC | Age: 57
End: 2022-05-03
Payer: COMMERCIAL

## 2022-05-03 VITALS
HEIGHT: 59 IN | SYSTOLIC BLOOD PRESSURE: 143 MMHG | TEMPERATURE: 97 F | RESPIRATION RATE: 20 BRPM | WEIGHT: 164 LBS | HEART RATE: 75 BPM | BODY MASS INDEX: 33.06 KG/M2 | OXYGEN SATURATION: 95 % | DIASTOLIC BLOOD PRESSURE: 91 MMHG

## 2022-05-03 DIAGNOSIS — R05.9 COUGH: Primary | ICD-10-CM

## 2022-05-03 PROCEDURE — 1160F RVW MEDS BY RX/DR IN RCRD: CPT | Mod: CPTII,S$GLB,, | Performed by: NURSE PRACTITIONER

## 2022-05-03 PROCEDURE — 3077F SYST BP >= 140 MM HG: CPT | Mod: CPTII,S$GLB,, | Performed by: NURSE PRACTITIONER

## 2022-05-03 PROCEDURE — 3080F PR MOST RECENT DIASTOLIC BLOOD PRESSURE >= 90 MM HG: ICD-10-PCS | Mod: CPTII,S$GLB,, | Performed by: NURSE PRACTITIONER

## 2022-05-03 PROCEDURE — 1160F PR REVIEW ALL MEDS BY PRESCRIBER/CLIN PHARMACIST DOCUMENTED: ICD-10-PCS | Mod: CPTII,S$GLB,, | Performed by: NURSE PRACTITIONER

## 2022-05-03 PROCEDURE — 3008F PR BODY MASS INDEX (BMI) DOCUMENTED: ICD-10-PCS | Mod: CPTII,S$GLB,, | Performed by: NURSE PRACTITIONER

## 2022-05-03 PROCEDURE — 1159F MED LIST DOCD IN RCRD: CPT | Mod: CPTII,S$GLB,, | Performed by: NURSE PRACTITIONER

## 2022-05-03 PROCEDURE — 99211 PR OFFICE/OUTPT VISIT, EST, LEVL I: ICD-10-PCS | Mod: S$GLB,,, | Performed by: NURSE PRACTITIONER

## 2022-05-03 PROCEDURE — 3080F DIAST BP >= 90 MM HG: CPT | Mod: CPTII,S$GLB,, | Performed by: NURSE PRACTITIONER

## 2022-05-03 PROCEDURE — 99211 OFF/OP EST MAY X REQ PHY/QHP: CPT | Mod: S$GLB,,, | Performed by: NURSE PRACTITIONER

## 2022-05-03 PROCEDURE — 1159F PR MEDICATION LIST DOCUMENTED IN MEDICAL RECORD: ICD-10-PCS | Mod: CPTII,S$GLB,, | Performed by: NURSE PRACTITIONER

## 2022-05-03 PROCEDURE — 3008F BODY MASS INDEX DOCD: CPT | Mod: CPTII,S$GLB,, | Performed by: NURSE PRACTITIONER

## 2022-05-03 PROCEDURE — 3077F PR MOST RECENT SYSTOLIC BLOOD PRESSURE >= 140 MM HG: ICD-10-PCS | Mod: CPTII,S$GLB,, | Performed by: NURSE PRACTITIONER

## 2022-05-03 NOTE — PROGRESS NOTES
"Subjective:       Patient ID: Rika June is a 56 y.o. female.    Vitals:  height is 4' 11" (1.499 m) and weight is 74.4 kg (164 lb). Her tympanic temperature is 97.3 °F (36.3 °C). Her blood pressure is 143/91 (abnormal) and her pulse is 75. Her respiration is 20 and oxygen saturation is 95%.     Chief Complaint: Cough    Patient stated her symptoms started 4 days ago.  She has soreness in her chest from coughing.  She denies any fever or chills.  Patient stated she was taking nyquil and that helped her sleep and throughout the day.  She was not exposed to anyone with COVID or flu.  She is not vaccinated with her COVID or flu shots. Patient stated she does not want to be tested for COVID or flu today.     Provider note begins here:    56-year-old female presents to clinic for evaluation of nasal congestion, cough x4 days.  She is not vaccinated for COVID, denies any recent sick contacts, she does not wish to be tested for anything on today's visit.  Patient states that she just wants to be checked for her bronchitis.  She reports taking NyQuil with no relief of her symptoms.  She is awake and alert, answers questions appropriately, appears angry or aggitated, but no acute distress noted on today's visit.    It was noted that patient was seen in this clinic on 04/12/2022, where at that time she was not tested for COVID, but treated for her bronchitis.    Cough  This is a new problem. The current episode started in the past 7 days. The problem has been gradually improving. Associated symptoms include ear pain, nasal congestion, postnasal drip and a sore throat. Pertinent negatives include no chest pain, chills, fever or headaches. Treatments tried: nyquil. The treatment provided mild relief. Her past medical history is significant for asthma and bronchitis.       Constitution: Negative for activity change, chills, sweating, fatigue and fever.   HENT: Positive for ear pain, postnasal drip and sore throat.  " "  Cardiovascular: Negative for chest pain.   Respiratory: Positive for cough.    Neurological: Negative for headaches.       Objective:      Physical Exam   Constitutional: She is oriented to person, place, and time.  Non-toxic appearance. She does not appear ill. No distress.   HENT:   Head: Normocephalic and atraumatic.   Eyes: Conjunctivae are normal. Right eye exhibits no discharge. Left eye exhibits no discharge.   Cardiovascular: Normal rate.   Pulmonary/Chest: Effort normal. No respiratory distress.   Abdominal: Normal appearance.   Neurological: She is alert and oriented to person, place, and time.   Skin: Skin is not diaphoretic.   Psychiatric: Her behavior is normal. Mood, judgment and thought content normal. Her affect is angry.   Physical exam limited due to patient walking out during visit.      Assessment:       1. Cough          Plan:         Cough    - Thoroughly discussed with patient that I cannot completely rule out COVID without testing, however, discussed with patient that I can not force her to be tested. Discussed that in order to check her for her bronchitis", part of that exam would be ruling out other possible respiratory infections.  Patient appears angry after me asking general questions related to Covid, states that she does not want to be tested for COVID.  Attempted to discussed medication options for patient, she states I do not do nasal sprays, and if you can help me for my bronchitis, then I'm leaving." Patient proceeds to exit exam room before complete physical exam was even performed.  Patient was extremely rude, and left facility.     - You must understand that you have received an Urgent Care treatment only and that you may be released before all of your medical problems are known or treated.   - You, the patient, will arrange for follow up care as instructed.   - If your condition worsens or fails to improve we recommend that you receive another evaluation at the ER " immediately or contact your PCP to discuss your concerns or return here.

## 2022-05-31 ENCOUNTER — PATIENT MESSAGE (OUTPATIENT)
Dept: ADMINISTRATIVE | Facility: HOSPITAL | Age: 57
End: 2022-05-31
Payer: COMMERCIAL

## 2022-06-01 DIAGNOSIS — Z12.11 COLON CANCER SCREENING: ICD-10-CM

## 2022-06-01 DIAGNOSIS — Z12.31 OTHER SCREENING MAMMOGRAM: ICD-10-CM

## 2022-08-19 ENCOUNTER — PATIENT MESSAGE (OUTPATIENT)
Dept: URGENT CARE | Facility: CLINIC | Age: 57
End: 2022-08-19
Payer: COMMERCIAL

## 2022-08-24 ENCOUNTER — PATIENT MESSAGE (OUTPATIENT)
Dept: ADMINISTRATIVE | Facility: HOSPITAL | Age: 57
End: 2022-08-24
Payer: COMMERCIAL

## 2022-08-24 ENCOUNTER — TELEPHONE (OUTPATIENT)
Dept: PODIATRY | Facility: CLINIC | Age: 57
End: 2022-08-24
Payer: COMMERCIAL

## 2022-08-24 NOTE — TELEPHONE ENCOUNTER
Spoke with Ms June in regards to assisting her with scheduling an appointment with Dr. Macario. Appt date and time of 8/29/22 at 8:30 am accepted. Per pt she has spoken with Worker's Comp in regards to her visit. No other needs voiced at this time.

## 2022-08-24 NOTE — TELEPHONE ENCOUNTER
----- Message from Anamika Jacob sent at 8/24/2022  8:48 AM CDT -----  Contact: LUIS ENRIQUE MAUNEL [[id number]] [phone]  Type: Appointment Request    Name of Caller: LUIS ENRIQUE MANUEL [[id number]]  When is the first available appointment? Do not have access  Reason for Visit:  Ankle pain, worker's comp visit  Best Call Back Number: [phone]  Additional Information:  Patient indicated she spoke with [facility]'s Worker's Comp department and they advised her to schedule an appointment.

## 2022-08-29 ENCOUNTER — OFFICE VISIT (OUTPATIENT)
Dept: PODIATRY | Facility: CLINIC | Age: 57
End: 2022-08-29
Payer: OTHER MISCELLANEOUS

## 2022-08-29 VITALS
HEART RATE: 76 BPM | BODY MASS INDEX: 33.12 KG/M2 | DIASTOLIC BLOOD PRESSURE: 87 MMHG | SYSTOLIC BLOOD PRESSURE: 156 MMHG | HEIGHT: 59 IN

## 2022-08-29 DIAGNOSIS — M25.571 CHRONIC PAIN OF RIGHT ANKLE: Primary | ICD-10-CM

## 2022-08-29 DIAGNOSIS — M77.51 TENDONITIS OF ANKLE, RIGHT: ICD-10-CM

## 2022-08-29 DIAGNOSIS — M62.81 MUSCLE WEAKNESS OF LOWER EXTREMITY: ICD-10-CM

## 2022-08-29 DIAGNOSIS — G89.29 CHRONIC PAIN OF RIGHT ANKLE: Primary | ICD-10-CM

## 2022-08-29 DIAGNOSIS — M25.371 RIGHT ANKLE INSTABILITY: ICD-10-CM

## 2022-08-29 PROCEDURE — 99214 OFFICE O/P EST MOD 30 MIN: CPT | Mod: S$GLB,,, | Performed by: PODIATRIST

## 2022-08-29 PROCEDURE — 99999 PR PBB SHADOW E&M-EST. PATIENT-LVL IV: CPT | Mod: PBBFAC,,, | Performed by: PODIATRIST

## 2022-08-29 PROCEDURE — 99999 PR PBB SHADOW E&M-EST. PATIENT-LVL IV: ICD-10-PCS | Mod: PBBFAC,,, | Performed by: PODIATRIST

## 2022-08-29 PROCEDURE — 99214 PR OFFICE/OUTPT VISIT, EST, LEVL IV, 30-39 MIN: ICD-10-PCS | Mod: S$GLB,,, | Performed by: PODIATRIST

## 2022-08-29 RX ORDER — MELOXICAM 15 MG/1
15 TABLET ORAL DAILY
Qty: 30 TABLET | Refills: 1 | Status: SHIPPED | OUTPATIENT
Start: 2022-08-29 | End: 2022-11-07

## 2022-08-29 NOTE — PROGRESS NOTES
Subjective:      Patient ID: Rika June is a 57 y.o. female.    Chief Complaint: Ankle Pain (Right foot )    Patient is a worker's compensation referral for chronic right ankle sprain which was initially treated at urgent care on 10/29/2020.  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  States that she has moderate amount of stiffness and swelling to the right ankle and the pain is aggravated by standing or walking.  She has not worn orthopedic boot or tendon physical therapy.  Recent MRI and CT confirmed nondisplaced posterior malleolus fracture of the right ankle.    04/22/2021:  Follow-up for nondisplaced posterior malleolus fracture of the right ankle x8 weeks.  She received a bone stimulator approximately a few days after her last visit has been using it daily as recommended.  She has been ambulating with the orthopedic boot and states that she has no pain while she is in a boot however she does have pain she takes the boot off and attempts to walk or she applies pressure to different points around her ankle.  She states that she still has stiffness and pain localized to the area.  Patient also states that she fell approximately 3 weeks ago while wearing the boot.  States she was descending down the stairs and landed towards the right knee.  She expressed that she did not have any pain during the fall or afterwards.    05/27/2021:  Follow-up for chronic right ankle pain with a history of delayed union of the posterior malleolar nondisplaced fracture.  She also has an osteochondral defect to the medial talar dome for which she is receiving offloading with an orthopedic boot.  She relates that she does not have much pain when she is at rest or walking with the orthopedic boot.  She does describe aching sensation when she tries to walk around her home without the boot on.  She also describes intermittent burning pain as increasing pointing to the medial  "hindfoot/ankle area traveling along the bottom of the foot.  States that her pain is manageable unless she touches an area around the ankle.    07/29/2021:  Presents ambulating with her orthopedic boot to the right lower extremity.  She has no pain with ambulation or activity while using the boot.  She previously completed her nerve conduction study which was normal however cannot tolerate the EMG.  Relates that she does not want to miss any time from work because she fears that her employment is in jeopardy.  She is also requesting to have some the restrictions lifted so that she can perform her duties while at work.  She works as a supervisor in a corrections facility.      08/25/2021:  Presents today for reassessment of the right ankle.  States that she has been ambulating with the ankle brace and a tennis shoe and experiences no significant pain.  She is able to work and perform her regular duties at work with the ankle brace and is requesting removal of her previous restrictions.  States that she will experience stiffness with the 1st few steps in the morning stating pain is rated as 4/10 that will improve significantly with moving around.      08/29/2022:  Follow up for chronic right ankle pain.  It has been over a year since her last examination.  She relates that she has a feeling of instability to the right ankle like her ankle is going to give out when she walks.  She is afraid to place too much weight on her foot.  She wears her brace sometimes however with the current shoe gear that she wears at work her brace is not fit in it.  She works at a [employer].  She also complains of weakness to the right lower extremity.  Pain aggravated by increased periods of standing and/or walking to the right ankle.    Vitals:    08/29/22 0821   BP: (!) 156/87   Pulse: 76   Height: 4' 11" (1.499 m)   PainSc:   3   PainLoc: Ankle      Past Medical History:   Diagnosis Date    Allergic rhinitis     " Allergy     Asthma        Past Surgical History:   Procedure Laterality Date     SECTION      TUBAL LIGATION         Family History   Problem Relation Age of Onset    Breast cancer Mother     Cancer Father         throat    Diabetes Maternal Grandmother     Ovarian cancer Neg Hx     Stroke Neg Hx     Hypertension Neg Hx     Melanoma Neg Hx        Social History     Socioeconomic History    Marital status: Legally    Tobacco Use    Smoking status: Never    Smokeless tobacco: Never   Substance and Sexual Activity    Alcohol use: No    Drug use: No    Sexual activity: Yes     Partners: Male   Other Topics Concern    Are you pregnant or think you may be? No    Breast-feeding No       Current Outpatient Medications   Medication Sig Dispense Refill    albuterol (PROVENTIL) 2.5 mg /3 mL (0.083 %) nebulizer solution Take 3 mLs (2.5 mg total) by nebulization every 6 (six) hours as needed for Wheezing. 1 Box 0    albuterol (PROVENTIL) 2.5 mg /3 mL (0.083 %) nebulizer solution Take 3 mLs (2.5 mg total) by nebulization every 6 (six) hours as needed for Wheezing. Rescue 90 each 2    albuterol (PROVENTIL/VENTOLIN HFA) 90 mcg/actuation inhaler Inhale 2 puffs into the lungs every 6 (six) hours as needed for Wheezing or Shortness of Breath. 18 g 2    triamcinolone acetonide 0.1% (KENALOG) 0.1 % ointment Apply topically 2 (two) times daily. 30 g 0    diclofenac sodium (VOLTAREN) 1 % Gel Apply 2 g topically 4 (four) times daily. for 10 days 100 g 5    fluticasone propionate (FLONASE) 50 mcg/actuation nasal spray SHAKE LIQUID AND USE 1 SPRAY(50 MCG) IN EACH NOSTRIL EVERY DAY (Patient not taking: No sig reported) 16 g 5    leg brace (ANKLE BRACE) Misc Dispense for right ankle (Patient not taking: No sig reported) 1 each 0    meloxicam (MOBIC) 15 MG tablet Take 1 tablet (15 mg total) by mouth once daily. 30 tablet 1    methocarbamoL (ROBAXIN) 500 MG Tab Take 1 tablet (500 mg total) by mouth 3 (three) times daily as  needed (pain). (Patient not taking: No sig reported) 21 tablet 0    olopatadine (PATANOL) 0.1 % ophthalmic solution Place 1 drop into both eyes 2 (two) times daily. (Patient not taking: Reported on 9/20/2021) 5 mL 2    predniSONE (DELTASONE) 20 MG tablet Take 1 tablet (20 mg total) by mouth once daily. (Patient not taking: No sig reported) 4 tablet 0    traMADoL (ULTRAM) 50 mg tablet TK 1 T PO Q 6 H PRF PAIN       No current facility-administered medications for this visit.       Review of patient's allergies indicates:   Allergen Reactions    Hydrocodone-acetaminophen Nausea Only and Nausea And Vomiting     Other reaction(s): Nausea         Review of Systems   Constitutional: Negative for chills, fever and malaise/fatigue.   HENT:  Negative for congestion and hearing loss.    Cardiovascular:  Negative for chest pain, claudication and leg swelling.   Respiratory:  Negative for cough and shortness of breath.    Skin:  Negative for color change and poor wound healing.   Musculoskeletal:  Positive for joint pain, joint swelling and stiffness. Negative for back pain, muscle cramps and muscle weakness.   Gastrointestinal:  Negative for nausea and vomiting.   Neurological:  Negative for numbness, paresthesias and weakness.   Psychiatric/Behavioral:  Negative for altered mental status.          Objective:      Physical Exam  Constitutional:       General: She is not in acute distress.  Cardiovascular:      Pulses:           Dorsalis pedis pulses are 2+ on the left side.        Posterior tibial pulses are 2+ on the left side.   Musculoskeletal:      Comments:   Mild limitation in active range of motion to the right ankle without pain however some mild audible crepitus heard.       Positive anterior drawer sign right ankle with moderate pain during testing.  There is some pain on palpation overlying the anterior lateral aspect of the ankle overlying the ATF ligament and inferior to lateral malleolus overlying the peroneals  and CF ligament area.  There is pain with stress inversion of the right ankle.  There is no pain with stress eversion appreciated.  There is some pain on palpation commencing posterior to the lateral malleolus and extending to the lateral heel along the peroneal tendons which is aggravated with active resistive eversion in the neutral position and worse in the plantar flexed position.  There is pain on palpation overlying the posterior tibial tendon convincing posterior tibial malleolus and extending along the distal 1/3 of the tendon.  No pain with active resisted inversion.  Manual muscle strength 4/5 to right lower extremity comparison to the left.    Mild pes planus foot structure with normal range of motion to the subtalar joint midtarsal joints right foot.   Skin:     General: Skin is warm.      Capillary Refill: Capillary refill takes less than 2 seconds.      Findings: No ecchymosis or erythema.      Nails: There is no clubbing.   Neurological:      Mental Status: She is alert and oriented to person, place, and time.      Sensory: Sensation is intact.      Motor: Weakness present.     EXAMINATION:  MRI ANKLE WITHOUT CONTRAST RIGHT     CLINICAL HISTORY:  ankle injury, WC, > 6wks;  Sprain of unspecified ligament of right ankle, subsequent encounter     TECHNIQUE:  Routine MRI evaluation of the right ankle performed without contrast.     COMPARISON:  Radiograph 10/29/2020.     FINDINGS:  Tendons: Posterior tibial, flexor digitorum longus, flexor hallucis longus, peroneus longus, peroneus brevis and visualized extensor tendons are normal.  There is mild thickening of the distal 4 cm of the Achilles tendon with minimal intrasubstance signal hyperintensity and a small lateral insertional enthesophyte.     Ligaments: There is attenuation of the anterior talofibular ligament and proximal aspect of the calcaneofibular ligaments.  There is thickening of the anterior-inferior tibiofibular ligament, presumably related  to remote injury.  Posterior talofibular, posterior-inferior tibiofibular, deltoid, spring, dorsal talonavicular, bifurcate and dorsal calcaneocuboid ligaments are intact.  Syndesmotic membrane appears normal.     Bones: There is linear signal hyperintensity coursing along the posterior malleolus concerning for a nondisplaced fracture, likely subacute.  No avascular necrosis.  No marrow infiltrative process.     Joints/cartilage: There is a 0.8 x 0.5 cm focal area of subchondral marrow edema within the medial talar dome with associated full-thickness fissuring of the overlying cartilage.  Subtalar, mid tarsal and remaining visualized cartilage spaces are preserved.  There is a small tibiotalar joint effusion.     Muscles: Normal bulk and signal intensity.  No accessory muscles.     Miscellaneous: Plantar fascia is normal.  Sinus tarsi demonstrates preserved signal intensity.  Tarsal tunnel is unremarkable.     Impression:     1. Linear area of fluid signal hyperintensity coursing along the posterior malleolus concerning for a nondisplaced fracture, likely subacute.  Recommend further evaluation with ankle CT.  2. Small osteochondral lesion at the medial talar dome.  3. Remote ATFL and calcaneofibular ligament sprains.  4. Mild distal Achilles tendinosis with small lateral insertional enthesophyte.  This report was flagged in Epic as abnormal.        Electronically signed by: Brady Lema MD  Date:                                            01/28/2021  Time:                                           10:14    EXAMINATION:  CT ANKLE (INCLUDING HINDFOOT) WITHOUT CONTRAST RIGHT     CLINICAL HISTORY:  Fracture, ankle;possible posterior malleolus fx on MRI, WC;  Sprain of unspecified ligament of right ankle, subsequent encounter     TECHNIQUE:  Axial images of the right ankle were obtained at 0.625 mm intervals without administration of IV contrast.  Coronal and sagittal reformatted images were reviewed.      COMPARISON:  MRI 01/27/2021     FINDINGS:  There is a well corticated obliquely oriented focus of lucency through the posterior aspect of the distal tibia, correlating with finding on recent MRI.  Given the corticated nature, this suggests either sequela of remote injury or prominent vascular channel.  No surrounding edema or induration to suggest acute process.  No findings to suggest fracture elsewhere.  Degenerative changes are noted of the calcaneus.     Impression:     1. Obliquely oriented well corticated lucency involving the posterior aspect of the tibia correlating with finding on previous MRI.  No surrounding edema.  Given the corticated nature, this suggests sequela of either remote injury or prominent vascular channel.  Correlation is advised.        Electronically signed by: Stephan Guzman MD  Date:                                            02/08/2021  Time:                                           13:47  .   EXAMINATION:  MRI ANKLE WITHOUT CONTRAST RIGHT     CLINICAL HISTORY:  ankle injury, WC, > 6wks;  Sprain of unspecified ligament of right ankle, subsequent encounter     TECHNIQUE:  Routine MRI evaluation of the right ankle performed without contrast.     COMPARISON:  Radiograph 10/29/2020.     FINDINGS:  Tendons: Posterior tibial, flexor digitorum longus, flexor hallucis longus, peroneus longus, peroneus brevis and visualized extensor tendons are normal.  There is mild thickening of the distal 4 cm of the Achilles tendon with minimal intrasubstance signal hyperintensity and a small lateral insertional enthesophyte.     Ligaments: There is attenuation of the anterior talofibular ligament and proximal aspect of the calcaneofibular ligaments.  There is thickening of the anterior-inferior tibiofibular ligament, presumably related to remote injury.  Posterior talofibular, posterior-inferior tibiofibular, deltoid, spring, dorsal talonavicular, bifurcate and dorsal calcaneocuboid ligaments are  intact.  Syndesmotic membrane appears normal.     Bones: There is linear signal hyperintensity coursing along the posterior malleolus concerning for a nondisplaced fracture, likely subacute.  No avascular necrosis.  No marrow infiltrative process.     Joints/cartilage: There is a 0.8 x 0.5 cm focal area of subchondral marrow edema within the medial talar dome with associated full-thickness fissuring of the overlying cartilage.  Subtalar, mid tarsal and remaining visualized cartilage spaces are preserved.  There is a small tibiotalar joint effusion.     Muscles: Normal bulk and signal intensity.  No accessory muscles.     Miscellaneous: Plantar fascia is normal.  Sinus tarsi demonstrates preserved signal intensity.  Tarsal tunnel is unremarkable.     Impression:     1. Linear area of fluid signal hyperintensity coursing along the posterior malleolus concerning for a nondisplaced fracture, likely subacute.  Recommend further evaluation with ankle CT.  2. Small osteochondral lesion at the medial talar dome.  3. Remote ATFL and calcaneofibular ligament sprains.  4. Mild distal Achilles tendinosis with small lateral insertional enthesophyte.  This report was flagged in Epic as abnormal.        Electronically signed by: Brady Lema MD  Date:                                            01/28/2021  Time:                                           10:14        Assessment:       Encounter Diagnoses   Name Primary?    Chronic pain of right ankle Yes    Right ankle instability     Tendonitis of ankle, right     Muscle weakness of lower extremity          Plan:       Rika was seen today for ankle pain.    Diagnoses and all orders for this visit:    Chronic pain of right ankle  -     X-Ray Ankle Complete 3 View Right; Future  -     MRI Ankle Without Contrast Right; Future    Right ankle instability  -     X-Ray Ankle Complete 3 View Right; Future  -     MRI Ankle Without Contrast Right; Future    Tendonitis of ankle,  right  -     X-Ray Ankle Complete 3 View Right; Future  -     MRI Ankle Without Contrast Right; Future    Muscle weakness of lower extremity  -     X-Ray Ankle Complete 3 View Right; Future  -     MRI Ankle Without Contrast Right; Future    Other orders  -     meloxicam (MOBIC) 15 MG tablet; Take 1 tablet (15 mg total) by mouth once daily.    I counseled the patient on her conditions, their implications and medical management.       Patient has evident instability to the right ankle.  Ordered follow-up weight-bearing right ankle x-ray to assess alignment.  Also ordered MRI of the right ankle to assess her collateral ligaments in addition to the deltoid ligament and for any possible tear of the peroneals and or posterior tibial tendons to the right ankle.   Patient has overall progressive symptoms of right ankle anticipate possible surgical intervention consisting of reconstruction of the collateral ligaments. She also had a previous OCD lesion which needs to be reassessed.      I have advised patient to wear the ankle brace throughout the day your help stabilize her ankle and to reduce the discomfort.  She may rest, ice and elevate p.r.n..  Also prescription for meloxicam dispensed today.    RTC within 4-6 weeks or prior as discussed.    A portion of this note was generated by voice recognition software and may contain spelling and grammar errors.

## 2022-08-31 ENCOUNTER — HOSPITAL ENCOUNTER (OUTPATIENT)
Dept: RADIOLOGY | Facility: HOSPITAL | Age: 57
Discharge: HOME | End: 2022-08-31
Attending: PODIATRIST
Payer: COMMERCIAL

## 2022-08-31 DIAGNOSIS — M62.81 MUSCLE WEAKNESS OF LOWER EXTREMITY: ICD-10-CM

## 2022-08-31 DIAGNOSIS — M77.51 TENDONITIS OF ANKLE, RIGHT: ICD-10-CM

## 2022-08-31 DIAGNOSIS — G89.29 CHRONIC PAIN OF RIGHT ANKLE: ICD-10-CM

## 2022-08-31 DIAGNOSIS — M25.571 CHRONIC PAIN OF RIGHT ANKLE: ICD-10-CM

## 2022-08-31 DIAGNOSIS — M25.371 RIGHT ANKLE INSTABILITY: ICD-10-CM

## 2022-08-31 PROCEDURE — 73610 X-RAY EXAM OF ANKLE: CPT | Mod: TC,FY,RT

## 2022-08-31 PROCEDURE — 73610 X-RAY EXAM OF ANKLE: CPT | Mod: 26,RT,, | Performed by: INTERNAL MEDICINE

## 2022-08-31 PROCEDURE — 73610 XR ANKLE COMPLETE 3 VIEW RIGHT: ICD-10-PCS | Mod: 26,RT,, | Performed by: INTERNAL MEDICINE

## 2022-09-01 DIAGNOSIS — I10 ESSENTIAL HYPERTENSION: ICD-10-CM

## 2022-09-13 ENCOUNTER — PATIENT OUTREACH (OUTPATIENT)
Dept: ADMINISTRATIVE | Facility: HOSPITAL | Age: 57
End: 2022-09-13
Payer: COMMERCIAL

## 2022-09-13 ENCOUNTER — TELEPHONE (OUTPATIENT)
Dept: PODIATRY | Facility: CLINIC | Age: 57
End: 2022-09-13
Payer: COMMERCIAL

## 2022-09-13 NOTE — TELEPHONE ENCOUNTER
Spoke with Ms June to inform her that Dr. Macario will not be in clinic on Friday, Sept 30 due to being in surgery. Accepted new appt date and time of 9/26/22 at 3:45 pm. Will send appt reminder in the mail

## 2022-09-13 NOTE — PROGRESS NOTES
Office scheduled for 11/18/22. Would like to discuss HM at [facility], declined to schedule at this time. Immunization's updated.

## 2022-09-15 ENCOUNTER — HOSPITAL ENCOUNTER (OUTPATIENT)
Dept: RADIOLOGY | Facility: HOSPITAL | Age: 57
Discharge: HOME | End: 2022-09-15
Attending: PODIATRIST
Payer: OTHER MISCELLANEOUS

## 2022-09-15 DIAGNOSIS — M25.371 RIGHT ANKLE INSTABILITY: ICD-10-CM

## 2022-09-15 DIAGNOSIS — M62.81 MUSCLE WEAKNESS OF LOWER EXTREMITY: ICD-10-CM

## 2022-09-15 DIAGNOSIS — G89.29 CHRONIC PAIN OF RIGHT ANKLE: ICD-10-CM

## 2022-09-15 DIAGNOSIS — M77.51 TENDONITIS OF ANKLE, RIGHT: ICD-10-CM

## 2022-09-15 DIAGNOSIS — M25.571 CHRONIC PAIN OF RIGHT ANKLE: ICD-10-CM

## 2022-09-15 PROCEDURE — 73721 MRI JNT OF LWR EXTRE W/O DYE: CPT | Mod: 26,RT,, | Performed by: RADIOLOGY

## 2022-09-15 PROCEDURE — 73721 MRI JNT OF LWR EXTRE W/O DYE: CPT | Mod: TC,RT

## 2022-09-15 PROCEDURE — 73721 MRI ANKLE WITHOUT CONTRAST RIGHT: ICD-10-PCS | Mod: 26,RT,, | Performed by: RADIOLOGY

## 2022-09-26 ENCOUNTER — OFFICE VISIT (OUTPATIENT)
Dept: PODIATRY | Facility: CLINIC | Age: 57
End: 2022-09-26
Payer: COMMERCIAL

## 2022-09-26 VITALS
DIASTOLIC BLOOD PRESSURE: 90 MMHG | HEIGHT: 59 IN | HEART RATE: 76 BPM | SYSTOLIC BLOOD PRESSURE: 162 MMHG | BODY MASS INDEX: 33.12 KG/M2

## 2022-09-26 DIAGNOSIS — S93.401S MODERATE RIGHT ANKLE SPRAIN, SEQUELA: ICD-10-CM

## 2022-09-26 DIAGNOSIS — M89.9 OSTEOCHONDRAL LESION OF TALAR DOME: ICD-10-CM

## 2022-09-26 DIAGNOSIS — M25.371 RIGHT ANKLE INSTABILITY: ICD-10-CM

## 2022-09-26 DIAGNOSIS — M94.9 OSTEOCHONDRAL LESION OF TALAR DOME: ICD-10-CM

## 2022-09-26 DIAGNOSIS — M77.51 TENDONITIS OF ANKLE, RIGHT: ICD-10-CM

## 2022-09-26 DIAGNOSIS — G89.29 CHRONIC PAIN OF RIGHT ANKLE: Primary | ICD-10-CM

## 2022-09-26 DIAGNOSIS — M25.571 CHRONIC PAIN OF RIGHT ANKLE: Primary | ICD-10-CM

## 2022-09-26 PROCEDURE — 99999 PR PBB SHADOW E&M-EST. PATIENT-LVL III: ICD-10-PCS | Mod: PBBFAC,,, | Performed by: PODIATRIST

## 2022-09-26 PROCEDURE — 99214 PR OFFICE/OUTPT VISIT, EST, LEVL IV, 30-39 MIN: ICD-10-PCS | Mod: S$GLB,,, | Performed by: PODIATRIST

## 2022-09-26 PROCEDURE — 99999 PR PBB SHADOW E&M-EST. PATIENT-LVL III: CPT | Mod: PBBFAC,,, | Performed by: PODIATRIST

## 2022-09-26 PROCEDURE — 99213 OFFICE O/P EST LOW 20 MIN: CPT | Mod: PBBFAC,PN | Performed by: PODIATRIST

## 2022-09-26 PROCEDURE — 99214 OFFICE O/P EST MOD 30 MIN: CPT | Mod: S$GLB,,, | Performed by: PODIATRIST

## 2022-09-27 NOTE — PROGRESS NOTES
Subjective:      Patient ID: Rika June is a 57 y.o. female.    Chief Complaint: Follow-up and Ankle Pain (Right foot )    Patient is a worker's compensation referral for chronic right ankle sprain which was initially treated at urgent care on 10/29/2020.  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  States that she has moderate amount of stiffness and swelling to the right ankle and the pain is aggravated by standing or walking.  She has not worn orthopedic boot or tendon physical therapy.  Recent MRI and CT confirmed nondisplaced posterior malleolus fracture of the right ankle.    04/22/2021:  Follow-up for nondisplaced posterior malleolus fracture of the right ankle x8 weeks.  She received a bone stimulator approximately a few days after her last visit has been using it daily as recommended.  She has been ambulating with the orthopedic boot and states that she has no pain while she is in a boot however she does have pain she takes the boot off and attempts to walk or she applies pressure to different points around her ankle.  She states that she still has stiffness and pain localized to the area.  Patient also states that she fell approximately 3 weeks ago while wearing the boot.  States she was descending down the stairs and landed towards the right knee.  She expressed that she did not have any pain during the fall or afterwards.    05/27/2021:  Follow-up for chronic right ankle pain with a history of delayed union of the posterior malleolar nondisplaced fracture.  She also has an osteochondral defect to the medial talar dome for which she is receiving offloading with an orthopedic boot.  She relates that she does not have much pain when she is at rest or walking with the orthopedic boot.  She does describe aching sensation when she tries to walk around her home without the boot on.  She also describes intermittent burning pain as increasing pointing to the  medial hindfoot/ankle area traveling along the bottom of the foot.  States that her pain is manageable unless she touches an area around the ankle.    07/29/2021:  Presents ambulating with her orthopedic boot to the right lower extremity.  She has no pain with ambulation or activity while using the boot.  She previously completed her nerve conduction study which was normal however cannot tolerate the EMG.  Relates that she does not want to miss any time from work because she fears that her employment is in jeopardy.  She is also requesting to have some the restrictions lifted so that she can perform her duties while at work.  She works as a supervisor in a corrections facility.      08/25/2021:  Presents today for reassessment of the right ankle.  States that she has been ambulating with the ankle brace and a tennis shoe and experiences no significant pain.  She is able to work and perform her regular duties at work with the ankle brace and is requesting removal of her previous restrictions.  States that she will experience stiffness with the 1st few steps in the morning stating pain is rated as 4/10 that will improve significantly with moving around.      08/29/2022:  Follow up for chronic right ankle pain.  It has been over a year since her last examination.  She relates that she has a feeling of instability to the right ankle like her ankle is going to give out when she walks.  She is afraid to place too much weight on her foot.  She wears her brace sometimes however with the current shoe gear that she wears at work her brace is not fit in it.  She works at a [employer].  She also complains of weakness to the right lower extremity.  Pain aggravated by increased periods of standing and/or walking to the right ankle.    09/26/2022:  Follow-up for right ankle pain following MRI.  Patient relates that she is unable to wear her ankle brace for tennis shoes for long periods of time at work.  States  "that the pain will increase with increased duration standing or walking.    Vitals:    22 1540   BP: (!) 162/90   Pulse: 76   Height: 4' 11" (1.499 m)   PainSc:   6   PainLoc: Ankle      Past Medical History:   Diagnosis Date    Allergic rhinitis     Allergy     Asthma        Past Surgical History:   Procedure Laterality Date     SECTION      TUBAL LIGATION         Family History   Problem Relation Age of Onset    Breast cancer Mother     Cancer Father         throat    Diabetes Maternal Grandmother     Ovarian cancer Neg Hx     Stroke Neg Hx     Hypertension Neg Hx     Melanoma Neg Hx        Social History     Socioeconomic History    Marital status: Legally    Tobacco Use    Smoking status: Never    Smokeless tobacco: Never   Substance and Sexual Activity    Alcohol use: No    Drug use: No    Sexual activity: Yes     Partners: Male   Other Topics Concern    Are you pregnant or think you may be? No    Breast-feeding No       Current Outpatient Medications   Medication Sig Dispense Refill    albuterol (PROVENTIL) 2.5 mg /3 mL (0.083 %) nebulizer solution Take 3 mLs (2.5 mg total) by nebulization every 6 (six) hours as needed for Wheezing. 1 Box 0    albuterol (PROVENTIL) 2.5 mg /3 mL (0.083 %) nebulizer solution Take 3 mLs (2.5 mg total) by nebulization every 6 (six) hours as needed for Wheezing. Rescue 90 each 2    albuterol (PROVENTIL/VENTOLIN HFA) 90 mcg/actuation inhaler Inhale 2 puffs into the lungs every 6 (six) hours as needed for Wheezing or Shortness of Breath. 18 g 2    meloxicam (MOBIC) 15 MG tablet Take 1 tablet (15 mg total) by mouth once daily. 30 tablet 1    traMADoL (ULTRAM) 50 mg tablet TK 1 T PO Q 6 H PRF PAIN      triamcinolone acetonide 0.1% (KENALOG) 0.1 % ointment Apply topically 2 (two) times daily. 30 g 0    diclofenac sodium (VOLTAREN) 1 % Gel Apply 2 g topically 4 (four) times daily. for 10 days 100 g 5    fluticasone propionate (FLONASE) 50 mcg/actuation nasal spray " SHAKE LIQUID AND USE 1 SPRAY(50 MCG) IN EACH NOSTRIL EVERY DAY (Patient not taking: No sig reported) 16 g 5    leg brace (ANKLE BRACE) [facility] Dispense for right ankle (Patient not taking: No sig reported) 1 each 0    methocarbamoL (ROBAXIN) 500 MG Tab Take 1 tablet (500 mg total) by mouth 3 (three) times daily as needed (pain). (Patient not taking: No sig reported) 21 tablet 0    olopatadine (PATANOL) 0.1 % ophthalmic solution Place 1 drop into both eyes 2 (two) times daily. (Patient not taking: Reported on 9/20/2021) 5 mL 2    predniSONE (DELTASONE) 20 MG tablet Take 1 tablet (20 mg total) by mouth once daily. (Patient not taking: No sig reported) 4 tablet 0     No current facility-administered medications for this visit.       Review of patient's allergies indicates:   Allergen Reactions    Hydrocodone-acetaminophen Nausea Only and Nausea And Vomiting     Other reaction(s): Nausea         Review of Systems   Constitutional: Negative for chills, fever and malaise/fatigue.   HENT:  Negative for congestion and hearing loss.    Cardiovascular:  Negative for chest pain, claudication and leg swelling.   Respiratory:  Negative for cough and shortness of breath.    Skin:  Negative for color change and poor wound healing.   Musculoskeletal:  Positive for joint pain, joint swelling and stiffness. Negative for back pain, muscle cramps and muscle weakness.   Gastrointestinal:  Negative for nausea and vomiting.   Neurological:  Negative for numbness, paresthesias and weakness.   Psychiatric/Behavioral:  Negative for altered mental status.          Objective:      Physical Exam  Constitutional:       General: She is not in acute distress.  Cardiovascular:      Pulses:           Dorsalis pedis pulses are 2+ on the left side.        Posterior tibial pulses are 2+ on the left side.   Musculoskeletal:      Comments: Limitation in active range of motion to the right ankle without pain however some mild audible crepitus heard.        Positive anterior drawer sign right ankle with moderate pain during testing.  There is some pain on palpation overlying the anterior lateral aspect of the ankle overlying the ATF ligament and inferior to lateral malleolus overlying the peroneals and CF ligament area.  There is pain with stress inversion of the right ankle.  There is no pain with stress eversion appreciated.  There is some pain on palpation commencing posterior to the lateral malleolus and extending to the lateral heel along the peroneal tendons which is aggravated with active resistive eversion in the neutral position and worse in the plantar flexed position.  There is pain on palpation overlying the posterior tibial tendon convincing posterior tibial malleolus and extending along the distal 1/3 of the tendon.  No pain with active resisted inversion.  Manual muscle strength 4/5 to right lower extremity comparison to the left.    Mild pes planus foot structure with normal range of motion to the subtalar joint midtarsal joints right foot.   Skin:     General: Skin is warm.      Capillary Refill: Capillary refill takes less than 2 seconds.      Findings: No ecchymosis or erythema.      Nails: There is no clubbing.   Neurological:      Mental Status: She is alert and oriented to person, place, and time.      Sensory: Sensation is intact.      Motor: Weakness present.           Assessment:       Encounter Diagnoses   Name Primary?    Chronic pain of right ankle Yes    Right ankle instability     Tendonitis of ankle, right     Osteochondral lesion of talar dome     Moderate right ankle sprain, sequela          Plan:       Rika was seen today for follow-up and ankle pain.    Diagnoses and all orders for this visit:    Chronic pain of right ankle  -     ANKLE FOOT ORTHOSIS FOR HOME USE    Right ankle instability  -     ANKLE FOOT ORTHOSIS FOR HOME USE    Tendonitis of ankle, right  -     ANKLE FOOT ORTHOSIS FOR HOME USE    Osteochondral lesion of talar  dome  -     ANKLE FOOT ORTHOSIS FOR HOME USE    Moderate right ankle sprain, sequela  -     ANKLE FOOT ORTHOSIS FOR HOME USE    I counseled the patient on her conditions, their implications and medical management.     MRI right ankle reviewed:  EXAMINATION:  MRI of the RIGHT ankle     CLINICAL HISTORY:  Pain     TECHNIQUE:  Multiplanar multisequence MRI examination of the RIGHT ankle.     COMPARISON:  08/31/2022     FINDINGS:  **MEDIAL COMPARTMENT     Posterior tibial tendon: Intact.No tendinosis.No tenosynovitis.  Type 1 os naviculare.     Flexor digitorum longus tendon: Normal.     Superficial Deltoid: Intact.     Deep Deltoid: Intact.     Tibiospring/ SM Calcaneonavicular (Spring)/Inferoplantar Complex: Intact     **LATERAL COMPARTMENT     Peroneus longus: Intact.     Peroneus brevis: Intact.     Superior peroneal retinaculum: Intact     Ligaments:     Interosseous (syndesmosis): Intact.     Anterior inferior tibiofibular (syndesmosis): Intact.     Posterior inferior tibiofibular (syndesmosis): Intact.     Anterior talofibular ligament: Attenuated consistent with chronic injury.     Calcaneofibular ligament: Attenuated consistent with chronic injury.     Posterior talofibular ligament: Intact.     **POSTERIOR COMPARTMENT     Flexor hallucis longus: Normal.     Posterior talus: Normal.     Achilles tendon: Normal.     Plantar fascia: Normal.Lateral cord of plantar fascia demonstrates normal appearance and insertion upon the base of the 5th MT.     **ANTERIOR COMPARTMENT     Tendons:     Anterior tibial tendon: Normal.  Insertion intact.     Extensor hallucis longus: Normal.     Extensor digitorum longus: Normal.     Ligaments:     Dorsal talonavicular ligament: Intact.     **ARTICULATIONS:     Tibiotalar joint: Abnormal with 2.5 mm posterolateral talar osteochondral lesion (coronal series 7, image 21 with associated bone marrow edema.  Additional early talar osteochondral lesion medially, 4.2 mm.  Subcortical  geode 3.4 mm at the level of the posterior tibial margin coronal series 7, image 23/sagittal series 4, image 12.  No evidence for anterior osseous spurs.     Subtalar joint: Normal.     Talonavicular joint: Normal.     Calcaneocuboid joint: Normal     Talus and calcaneus: Intact lateral process of talus and anterior process of calcaneus without fracture.     **GENERAL FINDINGS     Osseous Structures: No evidence of fracture     Muscles: Normal.     Nerves and tarsal tunnel: Normal.     Sinus tarsi: Normal.     Impression:     Degenerative change tibiotalar joint with osteochondral lesions at the level of the talus, predominantly posterolaterally with geode formation distal tibial subcortical region.  Findings are progressive from 01/27/2021.     Otherwise stable examination from 01/27/2021.        Electronically signed by: Yordy Hernandez MD  Date:                                            09/15/2022  Time:                                           08:46    We discussed continued conservative care such as more aggressive bracing in the form of an AFO, shoe gear modifications, activity modifications versus surgical intervention consisting of reconstructing the collateral and medial ankle ligaments with possible subchondral plasty of the subchondral cyst within the talus and distal posterior tibia with possible arthrotomy of the ankle.  Risks, benefits anticipate postop course discussed today.  No guarantees were given or implied.  Patient elected to continue conservative care at this time since she would like to exhaust every single option prior to consider surgical intervention.    Prescription for [place] brace was dispensed today.  Patient to continue using previously dispensed ankle brace for now.      RTC within 2 months or p.r.n. as discussed.    A portion of this note was generated by voice recognition software and may contain spelling and grammar errors.

## 2022-10-10 ENCOUNTER — TELEPHONE (OUTPATIENT)
Dept: PODIATRY | Facility: CLINIC | Age: 57
End: 2022-10-10
Payer: COMMERCIAL

## 2022-10-10 ENCOUNTER — PATIENT MESSAGE (OUTPATIENT)
Dept: ADMINISTRATIVE | Facility: HOSPITAL | Age: 57
End: 2022-10-10
Payer: COMMERCIAL

## 2022-10-10 NOTE — TELEPHONE ENCOUNTER
Spoke with Ms June who reports she is taking a self defense course through the [employer] she works at and is concerned of risk of repeat injury. Per Ms Slade needs a letter with restrictions for kicking, squatting and crouching. Told her I would let Dr. Macario know and once he drafts the letter I will upload into the patient portal for her. Verbalized understanding. Also of note, let her know I spoke with her  Irvin who did receive the information for her [place] brace and it should be pending authorization. No other needs voiced. Encouraged to call if further assistance is needed.

## 2022-10-10 NOTE — TELEPHONE ENCOUNTER
----- Message from Suzie Thompson sent at 10/10/2022 12:51 PM CDT -----  Regarding: Medical Assistance  Contact: Patient  Patient is requesting a call back regarding a letter for her restrictions   Patient stated she needs a letter with her restrictions listed for training at her job   Patient stated she needs something that stated she is limited to exercise ankle   Please Assist     Patient can be reached at [phone]

## 2022-10-11 ENCOUNTER — PATIENT MESSAGE (OUTPATIENT)
Dept: PODIATRY | Facility: CLINIC | Age: 57
End: 2022-10-11
Payer: COMMERCIAL

## 2022-10-12 ENCOUNTER — PATIENT MESSAGE (OUTPATIENT)
Dept: PODIATRY | Facility: CLINIC | Age: 57
End: 2022-10-12
Payer: COMMERCIAL

## 2022-11-18 ENCOUNTER — TELEPHONE (OUTPATIENT)
Dept: FAMILY MEDICINE | Facility: CLINIC | Age: 57
End: 2022-11-18
Payer: COMMERCIAL

## 2022-11-18 NOTE — TELEPHONE ENCOUNTER
----- Message from Lito Gonzáles sent at 11/18/2022  8:07 AM CST -----  Regarding: Late  Who is Calling: LUIS ENRIQUE MANUEL [[id number]]           What is the request in detail: Patient is running 15 minutes late.  If patient needs to reschedule, please contact.            Can the clinic reply by [facility]: NO           What Number to Call Back if not in [facility]: [phone]

## 2022-11-28 ENCOUNTER — OFFICE VISIT (OUTPATIENT)
Dept: PODIATRY | Facility: CLINIC | Age: 57
End: 2022-11-28
Payer: COMMERCIAL

## 2022-11-28 VITALS
SYSTOLIC BLOOD PRESSURE: 151 MMHG | HEART RATE: 80 BPM | HEIGHT: 59 IN | BODY MASS INDEX: 33.48 KG/M2 | DIASTOLIC BLOOD PRESSURE: 89 MMHG

## 2022-11-28 DIAGNOSIS — M77.51 TENDONITIS OF ANKLE, RIGHT: ICD-10-CM

## 2022-11-28 DIAGNOSIS — M25.571 CHRONIC PAIN OF RIGHT ANKLE: Primary | ICD-10-CM

## 2022-11-28 DIAGNOSIS — M89.9 OSTEOCHONDRAL LESION OF TALAR DOME: ICD-10-CM

## 2022-11-28 DIAGNOSIS — M25.371 RIGHT ANKLE INSTABILITY: ICD-10-CM

## 2022-11-28 DIAGNOSIS — M94.9 OSTEOCHONDRAL LESION OF TALAR DOME: ICD-10-CM

## 2022-11-28 DIAGNOSIS — S93.401S MODERATE RIGHT ANKLE SPRAIN, SEQUELA: ICD-10-CM

## 2022-11-28 DIAGNOSIS — G89.29 CHRONIC PAIN OF RIGHT ANKLE: Primary | ICD-10-CM

## 2022-11-28 PROCEDURE — 3079F DIAST BP 80-89 MM HG: CPT | Mod: CPTII,S$GLB,, | Performed by: PODIATRIST

## 2022-11-28 PROCEDURE — 3077F SYST BP >= 140 MM HG: CPT | Mod: CPTII,S$GLB,, | Performed by: PODIATRIST

## 2022-11-28 PROCEDURE — 1159F PR MEDICATION LIST DOCUMENTED IN MEDICAL RECORD: ICD-10-PCS | Mod: CPTII,S$GLB,, | Performed by: PODIATRIST

## 2022-11-28 PROCEDURE — 1160F RVW MEDS BY RX/DR IN RCRD: CPT | Mod: CPTII,S$GLB,, | Performed by: PODIATRIST

## 2022-11-28 PROCEDURE — 1160F PR REVIEW ALL MEDS BY PRESCRIBER/CLIN PHARMACIST DOCUMENTED: ICD-10-PCS | Mod: CPTII,S$GLB,, | Performed by: PODIATRIST

## 2022-11-28 PROCEDURE — 3008F PR BODY MASS INDEX (BMI) DOCUMENTED: ICD-10-PCS | Mod: CPTII,S$GLB,, | Performed by: PODIATRIST

## 2022-11-28 PROCEDURE — 3079F PR MOST RECENT DIASTOLIC BLOOD PRESSURE 80-89 MM HG: ICD-10-PCS | Mod: CPTII,S$GLB,, | Performed by: PODIATRIST

## 2022-11-28 PROCEDURE — 3008F BODY MASS INDEX DOCD: CPT | Mod: CPTII,S$GLB,, | Performed by: PODIATRIST

## 2022-11-28 PROCEDURE — 1159F MED LIST DOCD IN RCRD: CPT | Mod: CPTII,S$GLB,, | Performed by: PODIATRIST

## 2022-11-28 PROCEDURE — 99999 PR PBB SHADOW E&M-EST. PATIENT-LVL IV: CPT | Mod: PBBFAC,,, | Performed by: PODIATRIST

## 2022-11-28 PROCEDURE — 3077F PR MOST RECENT SYSTOLIC BLOOD PRESSURE >= 140 MM HG: ICD-10-PCS | Mod: CPTII,S$GLB,, | Performed by: PODIATRIST

## 2022-11-28 PROCEDURE — 99214 PR OFFICE/OUTPT VISIT, EST, LEVL IV, 30-39 MIN: ICD-10-PCS | Mod: S$GLB,,, | Performed by: PODIATRIST

## 2022-11-28 PROCEDURE — 99999 PR PBB SHADOW E&M-EST. PATIENT-LVL IV: ICD-10-PCS | Mod: PBBFAC,,, | Performed by: PODIATRIST

## 2022-11-28 PROCEDURE — 99214 OFFICE O/P EST MOD 30 MIN: CPT | Mod: S$GLB,,, | Performed by: PODIATRIST

## 2022-11-28 RX ORDER — IBUPROFEN 800 MG/1
800 TABLET ORAL EVERY 6 HOURS PRN
Qty: 60 TABLET | Refills: 1 | Status: SHIPPED | OUTPATIENT
Start: 2022-11-28 | End: 2023-02-28 | Stop reason: SDUPTHER

## 2022-11-28 NOTE — PROGRESS NOTES
Subjective:      Patient ID: Rika June is a 57 y.o. female.    Chief Complaint: Foot Problem and Ankle Pain (Right ankle, pain is coming and going)    Patient is a worker's compensation referral for chronic right ankle sprain which was initially treated at urgent care on 10/29/2020.  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  States that she has moderate amount of stiffness and swelling to the right ankle and the pain is aggravated by standing or walking.  She has not worn orthopedic boot or tendon physical therapy.  Recent MRI and CT confirmed nondisplaced posterior malleolus fracture of the right ankle.    04/22/2021:  Follow-up for nondisplaced posterior malleolus fracture of the right ankle x8 weeks.  She received a bone stimulator approximately a few days after her last visit has been using it daily as recommended.  She has been ambulating with the orthopedic boot and states that she has no pain while she is in a boot however she does have pain she takes the boot off and attempts to walk or she applies pressure to different points around her ankle.  She states that she still has stiffness and pain localized to the area.  Patient also states that she fell approximately 3 weeks ago while wearing the boot.  States she was descending down the stairs and landed towards the right knee.  She expressed that she did not have any pain during the fall or afterwards.    05/27/2021:  Follow-up for chronic right ankle pain with a history of delayed union of the posterior malleolar nondisplaced fracture.  She also has an osteochondral defect to the medial talar dome for which she is receiving offloading with an orthopedic boot.  She relates that she does not have much pain when she is at rest or walking with the orthopedic boot.  She does describe aching sensation when she tries to walk around her home without the boot on.  She also describes intermittent burning pain  as increasing pointing to the medial hindfoot/ankle area traveling along the bottom of the foot.  States that her pain is manageable unless she touches an area around the ankle.    07/29/2021:  Presents ambulating with her orthopedic boot to the right lower extremity.  She has no pain with ambulation or activity while using the boot.  She previously completed her nerve conduction study which was normal however cannot tolerate the EMG.  Relates that she does not want to miss any time from work because she fears that her employment is in jeopardy.  She is also requesting to have some the restrictions lifted so that she can perform her duties while at work.  She works as a supervisor in a corrections facility.      08/25/2021:  Presents today for reassessment of the right ankle.  States that she has been ambulating with the ankle brace and a tennis shoe and experiences no significant pain.  She is able to work and perform her regular duties at work with the ankle brace and is requesting removal of her previous restrictions.  States that she will experience stiffness with the 1st few steps in the morning stating pain is rated as 4/10 that will improve significantly with moving around.      08/29/2022:  Follow up for chronic right ankle pain.  It has been over a year since her last examination.  She relates that she has a feeling of instability to the right ankle like her ankle is going to give out when she walks.  She is afraid to place too much weight on her foot.  She wears her brace sometimes however with the current shoe gear that she wears at work her brace is not fit in it.  She works at a [employer].  She also complains of weakness to the right lower extremity.  Pain aggravated by increased periods of standing and/or walking to the right ankle.    09/26/2022:  Follow-up for right ankle pain following MRI.  Patient relates that she is unable to wear her ankle brace for tennis shoes for long  "periods of time at work.  States that the pain will increase with increased duration standing or walking.    2022:  Follow-up for chronic right ankle pain.  Patient is not wearing her ankle brace or compression stocking AMA.  She reports pain will wax and wane to the right ankle depending on her activity.  Notes that the pain and swelling to the right ankle resolve with rest.  She continues to work.  Wearing flip-flops today.  Relates that she worked last night.  She has not gone for fitting of right AFO as prescribed.    Vitals:    22 0808   BP: (!) 151/89   Pulse: 80   Height: 4' 11" (1.499 m)   PainSc:   8   PainLoc: Ankle      Past Medical History:   Diagnosis Date    Allergic rhinitis     Allergy     Asthma        Past Surgical History:   Procedure Laterality Date     SECTION      TUBAL LIGATION         Family History   Problem Relation Age of Onset    Breast cancer Mother     Cancer Father         throat    Diabetes Maternal Grandmother     Ovarian cancer Neg Hx     Stroke Neg Hx     Hypertension Neg Hx     Melanoma Neg Hx        Social History     Socioeconomic History    Marital status: Legally    Tobacco Use    Smoking status: Never    Smokeless tobacco: Never   Substance and Sexual Activity    Alcohol use: No    Drug use: No    Sexual activity: Yes     Partners: Male   Other Topics Concern    Are you pregnant or think you may be? No    Breast-feeding No       Current Outpatient Medications   Medication Sig Dispense Refill    albuterol (PROVENTIL) 2.5 mg /3 mL (0.083 %) nebulizer solution Take 3 mLs (2.5 mg total) by nebulization every 6 (six) hours as needed for Wheezing. 1 Box 0    albuterol (PROVENTIL) 2.5 mg /3 mL (0.083 %) nebulizer solution Take 3 mLs (2.5 mg total) by nebulization every 6 (six) hours as needed for Wheezing. Rescue 90 each 2    albuterol (PROVENTIL/VENTOLIN HFA) 90 mcg/actuation inhaler Inhale 2 puffs into the lungs every 6 (six) hours as needed for " Wheezing or Shortness of Breath. 18 g 2    fluticasone propionate (FLONASE) 50 mcg/actuation nasal spray SHAKE LIQUID AND USE 1 SPRAY(50 MCG) IN EACH NOSTRIL EVERY DAY 16 g 5    leg brace (ANKLE BRACE[place] [facility] for right ankle 1 each 0    methocarbamoL (ROBAXIN) 500 MG Tab Take 1 tablet (500 mg total) by mouth 3 (three) times daily as needed (pain). 21 tablet 0    predniSONE (DELTASONE) 20 MG tablet Take 1 tablet (20 mg total) by mouth once daily. 4 tablet 0    traMADoL (ULTRAM) 50 mg tablet TK 1 T PO Q 6 H PRF PAIN      triamcinolone acetonide 0.1% (KENALOG) 0.1 % ointment Apply topically 2 (two) times daily. 30 g 0    diclofenac sodium (VOLTAREN) 1 % Gel Apply 2 g topically 4 (four) times daily. for 10 days 100 g 5    ibuprofen (ADVIL,MOTRIN) 800 MG tablet Take 1 tablet (800 mg total) by mouth every 6 (six) hours as needed for Pain. 60 tablet 1    olopatadine (PATANOL) 0.1 % ophthalmic solution Place 1 drop into both eyes 2 (two) times daily. (Patient not taking: Reported on 9/20/2021) 5 mL 2     No current facility-administered medications for this visit.       Review of patient's allergies indicates:   Allergen Reactions    Hydrocodone-acetaminophen Nausea Only and Nausea And Vomiting     Other reaction(s): Nausea         Review of Systems   Constitutional: Negative for chills, fever and malaise/fatigue.   HENT:  Negative for congestion and hearing loss.    Cardiovascular:  Negative for chest pain, claudication and leg swelling.   Respiratory:  Negative for cough and shortness of breath.    Skin:  Negative for color change and poor wound healing.   Musculoskeletal:  Positive for joint pain, joint swelling and stiffness. Negative for back pain, muscle cramps and muscle weakness.   Gastrointestinal:  Negative for nausea and vomiting.   Neurological:  Negative for numbness, paresthesias and weakness.   Psychiatric/Behavioral:  Negative for altered mental status.          Objective:      Physical  Exam  Constitutional:       General: She is not in acute distress.  Cardiovascular:      Pulses:           Dorsalis pedis pulses are 2+ on the left side.        Posterior tibial pulses are 2+ on the left side.   Musculoskeletal:      Comments: Limitation in active range of motion to the right ankle without pain however some mild audible crepitus heard.       Positive anterior drawer sign right ankle with moderate pain during testing.  There is some pain on palpation overlying the anterior lateral aspect of the ankle overlying the ATF ligament and inferior to lateral malleolus overlying the peroneals and CF ligament area.  There is pain with stress inversion of the right ankle.  There is no pain with stress eversion appreciated.  There is some pain on palpation commencing posterior to the lateral malleolus and extending to the lateral heel along the peroneal tendons which is aggravated with active resistive eversion in the neutral position and worse in the plantar flexed position.  There is pain on palpation overlying the posterior tibial tendon commencing posterior tibial malleolus and extending along the distal 1/3 of the tendon.  No pain with active resisted inversion.  Manual muscle strength 4/5 to right lower extremity comparison to the left.    Mild pes planus foot structure with normal range of motion to the subtalar joint midtarsal joints right foot.   Skin:     General: Skin is warm.      Capillary Refill: Capillary refill takes less than 2 seconds.      Findings: No ecchymosis or erythema.      Nails: There is no clubbing.   Neurological:      Mental Status: She is alert and oriented to person, place, and time.      Sensory: Sensation is intact.      Motor: Weakness present.           Assessment:       Encounter Diagnoses   Name Primary?    Chronic pain of right ankle Yes    Right ankle instability     Tendonitis of ankle, right     Osteochondral lesion of talar dome     Moderate right ankle sprain, sequela           Plan:       Rika was seen today for foot problem and ankle pain.    Diagnoses and all orders for this visit:    Chronic pain of right ankle  -     X-Ray Ankle Complete Right; Future    Right ankle instability  -     X-Ray Ankle Complete Right; Future    Tendonitis of ankle, right    Osteochondral lesion of talar dome    Moderate right ankle sprain, sequela    Other orders  -     ibuprofen (ADVIL,MOTRIN) 800 MG tablet; Take 1 tablet (800 mg total) by mouth every 6 (six) hours as needed for Pain.    I counseled the patient on her conditions, their implications and medical management.     MRI right ankle reviewed:  EXAMINATION:  MRI of the RIGHT ankle     CLINICAL HISTORY:  Pain     TECHNIQUE:  Multiplanar multisequence MRI examination of the RIGHT ankle.     COMPARISON:  08/31/2022     FINDINGS:  **MEDIAL COMPARTMENT     Posterior tibial tendon: Intact.No tendinosis.No tenosynovitis.  Type 1 os naviculare.     Flexor digitorum longus tendon: Normal.     Superficial Deltoid: Intact.     Deep Deltoid: Intact.     Tibiospring/ SM Calcaneonavicular (Spring)/Inferoplantar Complex: Intact     **LATERAL COMPARTMENT     Peroneus longus: Intact.     Peroneus brevis: Intact.     Superior peroneal retinaculum: Intact     Ligaments:     Interosseous (syndesmosis): Intact.     Anterior inferior tibiofibular (syndesmosis): Intact.     Posterior inferior tibiofibular (syndesmosis): Intact.     Anterior talofibular ligament: Attenuated consistent with chronic injury.     Calcaneofibular ligament: Attenuated consistent with chronic injury.     Posterior talofibular ligament: Intact.     **POSTERIOR COMPARTMENT     Flexor hallucis longus: Normal.     Posterior talus: Normal.     Achilles tendon: Normal.     Plantar fascia: Normal.Lateral cord of plantar fascia demonstrates normal appearance and insertion upon the base of the 5th MT.     **ANTERIOR COMPARTMENT     Tendons:     Anterior tibial tendon: Normal.  Insertion  intact.     Extensor hallucis longus: Normal.     Extensor digitorum longus: Normal.     Ligaments:     Dorsal talonavicular ligament: Intact.     **ARTICULATIONS:     Tibiotalar joint: Abnormal with 2.5 mm posterolateral talar osteochondral lesion (coronal series 7, image 21 with associated bone marrow edema.  Additional early talar osteochondral lesion medially, 4.2 mm.  Subcortical geode 3.4 mm at the level of the posterior tibial margin coronal series 7, image 23/sagittal series 4, image 12.  No evidence for anterior osseous spurs.     Subtalar joint: Normal.     Talonavicular joint: Normal.     Calcaneocuboid joint: Normal     Talus and calcaneus: Intact lateral process of talus and anterior process of calcaneus without fracture.     **GENERAL FINDINGS     Osseous Structures: No evidence of fracture     Muscles: Normal.     Nerves and tarsal tunnel: Normal.     Sinus tarsi: Normal.     Impression:     Degenerative change tibiotalar joint with osteochondral lesions at the level of the talus, predominantly posterolaterally with geode formation distal tibial subcortical region.  Findings are progressive from 01/27/2021.     Otherwise stable examination from 01/27/2021.        Electronically signed by: Yordy Hernandez MD  Date:                                            09/15/2022  Time:                                           08:46    We discussed at the previous MRI did not show any involvement of the collateral ligaments however from a clinical perspective she has laxity to the right ankle with significant pain most likely from overuse of the posterior tibial tendon and peroneal tendons to help stabilize the ankle.  We discussed more aggressive bracing in order to help alleviate discomfort to the right ankle.  A form with DME vendors was given to the patient and patient was instructed to go today to begin the process.  She is to check with her  to make sure that the vendor is appropriate.    We  discussed wearing a compression stocking followed by the right ankle brace and this is to be worn throughout the day and removed at night.  She should wear appropriate shoe gear to help support feet especially since she has flat feet which cause excessive pronation.    Continue rest, ice and elevation p.r.n..  We discussed the harmful effects a long-term NSAID therapy and have recommended that she discontinue meloxicam since it is making her drowsy and she take ibuprofen 800 mg as needed.    Follow-up weight-bearing right ankle x-ray at next visit within 3 months.    A portion of this note was generated by voice recognition software and may contain spelling and grammar errors.

## 2023-01-18 ENCOUNTER — PATIENT MESSAGE (OUTPATIENT)
Dept: ADMINISTRATIVE | Facility: HOSPITAL | Age: 58
End: 2023-01-18
Payer: COMMERCIAL

## 2023-01-31 ENCOUNTER — PATIENT OUTREACH (OUTPATIENT)
Dept: ADMINISTRATIVE | Facility: HOSPITAL | Age: 58
End: 2023-01-31
Payer: COMMERCIAL

## 2023-01-31 DIAGNOSIS — R73.03 PREDIABETES: Primary | ICD-10-CM

## 2023-01-31 NOTE — PROGRESS NOTES
Health Maintenance Due   Topic Date Due    COVID-19 Vaccine (1) Never done    Pneumococcal Vaccines (Age 0-64) (1 - PCV) Never done    HIV Screening  Never done    TETANUS VACCINE  Never done    Mammogram  Never done    Colorectal Cancer Screening  Never done    Shingles Vaccine (1 of 2) Never done    Hemoglobin A1c (Diabetic Prevention Screening)  06/20/2014    Influenza Vaccine (1) 09/01/2022    Cervical Cancer Screening  09/16/2022

## 2023-02-16 ENCOUNTER — PATIENT OUTREACH (OUTPATIENT)
Dept: ADMINISTRATIVE | Facility: HOSPITAL | Age: 58
End: 2023-02-16
Payer: COMMERCIAL

## 2023-02-16 ENCOUNTER — PATIENT MESSAGE (OUTPATIENT)
Dept: ADMINISTRATIVE | Facility: HOSPITAL | Age: 58
End: 2023-02-16
Payer: COMMERCIAL

## 2023-02-28 ENCOUNTER — HOSPITAL ENCOUNTER (OUTPATIENT)
Dept: RADIOLOGY | Facility: HOSPITAL | Age: 58
Discharge: HOME | End: 2023-02-28
Attending: PODIATRIST
Payer: COMMERCIAL

## 2023-02-28 ENCOUNTER — OFFICE VISIT (OUTPATIENT)
Dept: PODIATRY | Facility: CLINIC | Age: 58
End: 2023-02-28
Payer: COMMERCIAL

## 2023-02-28 VITALS
HEIGHT: 59 IN | BODY MASS INDEX: 33.26 KG/M2 | WEIGHT: 165 LBS | HEART RATE: 77 BPM | SYSTOLIC BLOOD PRESSURE: 140 MMHG | DIASTOLIC BLOOD PRESSURE: 86 MMHG

## 2023-02-28 DIAGNOSIS — G89.29 CHRONIC PAIN OF RIGHT ANKLE: Primary | ICD-10-CM

## 2023-02-28 DIAGNOSIS — M25.371 RIGHT ANKLE INSTABILITY: ICD-10-CM

## 2023-02-28 DIAGNOSIS — G89.29 CHRONIC PAIN OF RIGHT ANKLE: ICD-10-CM

## 2023-02-28 DIAGNOSIS — M25.571 CHRONIC PAIN OF RIGHT ANKLE: ICD-10-CM

## 2023-02-28 DIAGNOSIS — M25.571 CHRONIC PAIN OF RIGHT ANKLE: Primary | ICD-10-CM

## 2023-02-28 DIAGNOSIS — M19.171 POST-TRAUMATIC ARTHRITIS OF RIGHT ANKLE: ICD-10-CM

## 2023-02-28 PROCEDURE — 1159F MED LIST DOCD IN RCRD: CPT | Mod: CPTII,S$GLB,, | Performed by: PODIATRIST

## 2023-02-28 PROCEDURE — 3077F PR MOST RECENT SYSTOLIC BLOOD PRESSURE >= 140 MM HG: ICD-10-PCS | Mod: CPTII,S$GLB,, | Performed by: PODIATRIST

## 2023-02-28 PROCEDURE — 3077F SYST BP >= 140 MM HG: CPT | Mod: CPTII,S$GLB,, | Performed by: PODIATRIST

## 2023-02-28 PROCEDURE — 99214 PR OFFICE/OUTPT VISIT, EST, LEVL IV, 30-39 MIN: ICD-10-PCS | Mod: S$GLB,,, | Performed by: PODIATRIST

## 2023-02-28 PROCEDURE — 99999 PR PBB SHADOW E&M-EST. PATIENT-LVL III: CPT | Mod: PBBFAC,,, | Performed by: PODIATRIST

## 2023-02-28 PROCEDURE — 73610 XR ANKLE COMPLETE 3 VIEW RIGHT: ICD-10-PCS | Mod: 26,RT,, | Performed by: RADIOLOGY

## 2023-02-28 PROCEDURE — 73610 X-RAY EXAM OF ANKLE: CPT | Mod: 26,RT,, | Performed by: RADIOLOGY

## 2023-02-28 PROCEDURE — 3008F PR BODY MASS INDEX (BMI) DOCUMENTED: ICD-10-PCS | Mod: CPTII,S$GLB,, | Performed by: PODIATRIST

## 2023-02-28 PROCEDURE — 3079F PR MOST RECENT DIASTOLIC BLOOD PRESSURE 80-89 MM HG: ICD-10-PCS | Mod: CPTII,S$GLB,, | Performed by: PODIATRIST

## 2023-02-28 PROCEDURE — 99214 OFFICE O/P EST MOD 30 MIN: CPT | Mod: S$GLB,,, | Performed by: PODIATRIST

## 2023-02-28 PROCEDURE — 3008F BODY MASS INDEX DOCD: CPT | Mod: CPTII,S$GLB,, | Performed by: PODIATRIST

## 2023-02-28 PROCEDURE — 99999 PR PBB SHADOW E&M-EST. PATIENT-LVL III: ICD-10-PCS | Mod: PBBFAC,,, | Performed by: PODIATRIST

## 2023-02-28 PROCEDURE — 3079F DIAST BP 80-89 MM HG: CPT | Mod: CPTII,S$GLB,, | Performed by: PODIATRIST

## 2023-02-28 PROCEDURE — 73610 X-RAY EXAM OF ANKLE: CPT | Mod: TC,FY,RT

## 2023-02-28 PROCEDURE — 1159F PR MEDICATION LIST DOCUMENTED IN MEDICAL RECORD: ICD-10-PCS | Mod: CPTII,S$GLB,, | Performed by: PODIATRIST

## 2023-02-28 RX ORDER — IBUPROFEN 800 MG/1
800 TABLET ORAL EVERY 6 HOURS PRN
Qty: 60 TABLET | Refills: 1 | Status: SHIPPED | OUTPATIENT
Start: 2023-02-28 | End: 2024-03-08

## 2023-02-28 NOTE — PROGRESS NOTES
Subjective:      Patient ID: Rika June is a 57 y.o. female.    Chief Complaint: Ankle Pain (Right ankle)      Patient is a worker's compensation referral for chronic right ankle sprain which was initially treated at urgent care on 10/29/2020.  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  States that she has moderate amount of stiffness and swelling to the right ankle and the pain is aggravated by standing or walking.  She has not worn orthopedic boot or tendon physical therapy.  Recent MRI and CT confirmed nondisplaced posterior malleolus fracture of the right ankle.    04/22/2021:  Follow-up for nondisplaced posterior malleolus fracture of the right ankle x8 weeks.  She received a bone stimulator approximately a few days after her last visit has been using it daily as recommended.  She has been ambulating with the orthopedic boot and states that she has no pain while she is in a boot however she does have pain she takes the boot off and attempts to walk or she applies pressure to different points around her ankle.  She states that she still has stiffness and pain localized to the area.  Patient also states that she fell approximately 3 weeks ago while wearing the boot.  States she was descending down the stairs and landed towards the right knee.  She expressed that she did not have any pain during the fall or afterwards.    05/27/2021:  Follow-up for chronic right ankle pain with a history of delayed union of the posterior malleolar nondisplaced fracture.  She also has an osteochondral defect to the medial talar dome for which she is receiving offloading with an orthopedic boot.  She relates that she does not have much pain when she is at rest or walking with the orthopedic boot.  She does describe aching sensation when she tries to walk around her home without the boot on.  She also describes intermittent burning pain as increasing pointing to the medial  hindfoot/ankle area traveling along the bottom of the foot.  States that her pain is manageable unless she touches an area around the ankle.    07/29/2021:  Presents ambulating with her orthopedic boot to the right lower extremity.  She has no pain with ambulation or activity while using the boot.  She previously completed her nerve conduction study which was normal however cannot tolerate the EMG.  Relates that she does not want to miss any time from work because she fears that her employment is in jeopardy.  She is also requesting to have some the restrictions lifted so that she can perform her duties while at work.  She works as a supervisor in a corrections facility.      08/25/2021:  Presents today for reassessment of the right ankle.  States that she has been ambulating with the ankle brace and a tennis shoe and experiences no significant pain.  She is able to work and perform her regular duties at work with the ankle brace and is requesting removal of her previous restrictions.  States that she will experience stiffness with the 1st few steps in the morning stating pain is rated as 4/10 that will improve significantly with moving around.      08/29/2022:  Follow up for chronic right ankle pain.  It has been over a year since her last examination.  She relates that she has a feeling of instability to the right ankle like her ankle is going to give out when she walks.  She is afraid to place too much weight on her foot.  She wears her brace sometimes however with the current shoe gear that she wears at work her brace is not fit in it.  She works at a [employer].  She also complains of weakness to the right lower extremity.  Pain aggravated by increased periods of standing and/or walking to the right ankle.    09/26/2022:  Follow-up for right ankle pain following MRI.  Patient relates that she is unable to wear her ankle brace for tennis shoes for long periods of time at work.  States that the  "pain will increase with increased duration standing or walking.    2022:  Follow-up for chronic right ankle pain.  Patient is not wearing her ankle brace or compression stocking AMA.  She reports pain will wax and wane to the right ankle depending on her activity.  Notes that the pain and swelling to the right ankle resolve with rest.  She continues to work.  Wearing flip-flops today.  Relates that she worked last night.  She has not gone for fitting of right AFO as prescribed.    2023:  Follow-up for chronic right ankle pain.  She receives the [place] brace and has been wearing it at work.  Reports improvement in subjective pain while working which is when she has a significant flare-up in pain.  She is able to perform her duties and has no reduced ability to perform her activities of daily living.  She is ambulating with flip-flops today.    Vitals:    23 1511   BP: (!) 140/86   Pulse: 77   Weight: 74.8 kg (165 lb)   Height: 4' 11" (1.499 m)   PainSc:   6      Past Medical History:   Diagnosis Date    Allergic rhinitis     Allergy     Asthma        Past Surgical History:   Procedure Laterality Date     SECTION      TUBAL LIGATION         Family History   Problem Relation Age of Onset    Breast cancer Mother     Cancer Father         throat    Diabetes Maternal Grandmother     Ovarian cancer Neg Hx     Stroke Neg Hx     Hypertension Neg Hx     Melanoma Neg Hx        Social History     Socioeconomic History    Marital status: Legally    Tobacco Use    Smoking status: Never    Smokeless tobacco: Never   Substance and Sexual Activity    Alcohol use: No    Drug use: No    Sexual activity: Yes     Partners: Male   Other Topics Concern    Are you pregnant or think you may be? No    Breast-feeding No       Current Outpatient Medications   Medication Sig Dispense Refill    albuterol (PROVENTIL) 2.5 mg /3 mL (0.083 %) nebulizer solution Take 3 mLs (2.5 mg total) by nebulization every 6 " (six) hours as needed for Wheezing. 1 Box 0    albuterol (PROVENTIL) 2.5 mg /3 mL (0.083 %) nebulizer solution Take 3 mLs (2.5 mg total) by nebulization every 6 (six) hours as needed for Wheezing. Rescue 90 each 2    albuterol (PROVENTIL/VENTOLIN HFA) 90 mcg/actuation inhaler Inhale 2 puffs into the lungs every 6 (six) hours as needed for Wheezing or Shortness of Breath. 18 g 2    fluticasone propionate (FLONASE) 50 mcg/actuation nasal spray SHAKE LIQUID AND USE 1 SPRAY(50 MCG) IN EACH NOSTRIL EVERY DAY 16 g 5    leg brace (ANKLE BRACE) [facility] [place]pense for right ankle 1 each 0    methocarbamoL (ROBAXIN) 500 MG Tab Take 1 tablet (500 mg total) by mouth 3 (three) times daily as needed (pain). 21 tablet 0    predniSONE (DELTASONE) 20 MG tablet Take 1 tablet (20 mg total) by mouth once daily. 4 tablet 0    traMADoL (ULTRAM) 50 mg tablet TK 1 T PO Q 6 H PRF PAIN      triamcinolone acetonide 0.1% (KENALOG) 0.1 % ointment Apply topically 2 (two) times daily. 30 g 0    diclofenac sodium (VOLTAREN) 1 % Gel Apply 2 g topically 4 (four) times daily. for 10 days 100 g 5    ibuprofen (ADVIL,MOTRIN) 800 MG tablet Take 1 tablet (800 mg total) by mouth every 6 (six) hours as needed for Pain. 60 tablet 1    olopatadine (PATANOL) 0.1 % ophthalmic solution Place 1 drop into both eyes 2 (two) times daily. (Patient not taking: Reported on 9/20/2021) 5 mL 2     No current facility-administered medications for this visit.       Review of patient's allergies indicates:   Allergen Reactions    Hydrocodone-acetaminophen Nausea Only and Nausea And Vomiting     Other reaction(s): Nausea         Review of Systems   Constitutional: Negative for chills, fever and malaise/fatigue.   HENT:  Negative for congestion and hearing loss.    Cardiovascular:  Negative for chest pain, claudication and leg swelling.   Respiratory:  Negative for cough and shortness of breath.    Skin:  Negative for color change and poor wound healing.   Musculoskeletal:   Positive for joint pain, joint swelling and stiffness. Negative for back pain, muscle cramps and muscle weakness.   Gastrointestinal:  Negative for nausea and vomiting.   Neurological:  Negative for numbness, paresthesias and weakness.   Psychiatric/Behavioral:  Negative for altered mental status.          Objective:      Physical Exam  Constitutional:       General: She is not in acute distress.  Cardiovascular:      Pulses:           Dorsalis pedis pulses are 2+ on the left side.        Posterior tibial pulses are 2+ on the left side.   Musculoskeletal:      Comments: Limitation in active range of motion to the right ankle without pain however some mild audible crepitus heard.       Positive anterior drawer sign right ankle with moderate pain during testing.  There is some pain on palpation overlying the anterior lateral aspect of the ankle overlying the ATF ligament and inferior to lateral malleolus overlying the peroneals and CF ligament area.  Retro malleolar pain overlying the peroneal tendons right ankle which is aggravated by active resisted eversion and neutral and plantar flex position.  There is pain with stress inversion of the right ankle.  There is no pain with stress eversion appreciated.  There is pain on palpation overlying the posterior tibial tendon commencing posterior tibial malleolus and extending along the distal 1/3 of the tendon.  No pain with active resisted inversion.  Manual muscle strength 4/5 to right lower extremity comparison to the left.    Mild pes planus foot structure with normal range of motion to the subtalar joint midtarsal joints right foot.   Skin:     General: Skin is warm.      Capillary Refill: Capillary refill takes less than 2 seconds.      Findings: No ecchymosis or erythema.      Nails: There is no clubbing.   Neurological:      Mental Status: She is alert and oriented to person, place, and time.      Sensory: Sensation is intact.      Motor: Weakness present.            Assessment:       Encounter Diagnoses   Name Primary?    Chronic pain of right ankle Yes    Right ankle instability     Post-traumatic arthritis of right ankle          Plan:       Rika was seen today for ankle pain.    Diagnoses and all orders for this visit:    Chronic pain of right ankle    Right ankle instability    Post-traumatic arthritis of right ankle    Other orders  -     ibuprofen (ADVIL,MOTRIN) 800 MG tablet; Take 1 tablet (800 mg total) by mouth every 6 (six) hours as needed for Pain.      I counseled the patient on her conditions, their implications and medical management.    Current exam is very similar to her previous exam however patient feels as though she is able to function and refuses surgical intervention at this time.  Recommend continued conservative care with AFO and modified shoe gear.      Rest, ice and elevation p.r.n. as discussed.  We also discussed limiting NSAID use and also using Tylenol arthritis in order to reduce the amount of ibuprofen that she takes.  We also discussed about the long-term consequences of NSAID use.    Unable to review right ankle x-ray completed today.  Will review once imaging Link is available.    RTC within 6 months or p.r.n. as discussed.     Assisted per Johnson Bowden DPM PGY 2    A portion of this note was generated by voice recognition software and may contain spelling and grammar errors.

## 2023-03-01 ENCOUNTER — PATIENT MESSAGE (OUTPATIENT)
Dept: PODIATRY | Facility: HOSPITAL | Age: 58
End: 2023-03-01
Payer: COMMERCIAL

## 2023-03-23 ENCOUNTER — PATIENT MESSAGE (OUTPATIENT)
Dept: ADMINISTRATIVE | Facility: HOSPITAL | Age: 58
End: 2023-03-23
Payer: COMMERCIAL

## 2023-07-07 ENCOUNTER — PATIENT OUTREACH (OUTPATIENT)
Dept: ADMINISTRATIVE | Facility: HOSPITAL | Age: 58
End: 2023-07-07
Payer: COMMERCIAL

## 2023-07-07 ENCOUNTER — PATIENT MESSAGE (OUTPATIENT)
Dept: ADMINISTRATIVE | Facility: HOSPITAL | Age: 58
End: 2023-07-07
Payer: COMMERCIAL

## 2023-07-07 NOTE — PROGRESS NOTES
Reached out to pt in regards to scheduling an OV, left voicemail. Immunization's updated/triggered.

## 2023-10-30 ENCOUNTER — TELEPHONE (OUTPATIENT)
Dept: PODIATRY | Facility: CLINIC | Age: 58
End: 2023-10-30
Payer: COMMERCIAL

## 2023-10-30 NOTE — TELEPHONE ENCOUNTER
Spoke with Ms June to offer assistance with scheduling an appt with Dr Macario for 10/31/23 at 2:15 pm, which she accepted, for treatment of a left foot fracture. No other needs voiced. Encouraged call back if needed.

## 2023-10-30 NOTE — TELEPHONE ENCOUNTER
----- Message from Claudia Damon LPN sent at 10/30/2023  3:00 PM CDT -----    ----- Message -----  From: Crista Prieto  Sent: 10/30/2023   2:54 PM CDT  To: [facility] Clinical Staff    Type:  Sooner Apoointment Request    Caller is requesting a sooner appointment.  Caller declined first available appointment listed below.  Caller will not accept being placed on the waitlist and is requesting a message be sent to doctor.  Name of Caller:pt   When is the first available appointment?  Symptoms:left foot fracture   Would the patient rather a call back or a response via [facility]? Call   Best Call Back Number:[phone]  Additional Information:none

## 2023-10-31 ENCOUNTER — HOSPITAL ENCOUNTER (OUTPATIENT)
Dept: RADIOLOGY | Facility: HOSPITAL | Age: 58
Discharge: HOME | End: 2023-10-31
Attending: PODIATRIST
Payer: COMMERCIAL

## 2023-10-31 ENCOUNTER — OFFICE VISIT (OUTPATIENT)
Dept: PODIATRY | Facility: CLINIC | Age: 58
End: 2023-10-31
Payer: COMMERCIAL

## 2023-10-31 VITALS
HEIGHT: 59 IN | BODY MASS INDEX: 33.26 KG/M2 | HEART RATE: 77 BPM | SYSTOLIC BLOOD PRESSURE: 147 MMHG | WEIGHT: 165 LBS | DIASTOLIC BLOOD PRESSURE: 86 MMHG

## 2023-10-31 DIAGNOSIS — S92.352A FRACTURE OF BASE OF FIFTH METATARSAL BONE, LEFT, CLOSED, INITIAL ENCOUNTER: Primary | ICD-10-CM

## 2023-10-31 DIAGNOSIS — M19.171 POST-TRAUMATIC ARTHRITIS OF RIGHT ANKLE: ICD-10-CM

## 2023-10-31 DIAGNOSIS — G89.29 CHRONIC PAIN OF RIGHT ANKLE: ICD-10-CM

## 2023-10-31 DIAGNOSIS — M25.571 CHRONIC PAIN OF RIGHT ANKLE: ICD-10-CM

## 2023-10-31 DIAGNOSIS — S92.352A FRACTURE OF BASE OF FIFTH METATARSAL BONE, LEFT, CLOSED, INITIAL ENCOUNTER: ICD-10-CM

## 2023-10-31 PROCEDURE — 3008F BODY MASS INDEX DOCD: CPT | Mod: CPTII,S$GLB,, | Performed by: PODIATRIST

## 2023-10-31 PROCEDURE — 73630 X-RAY EXAM OF FOOT: CPT | Mod: TC,PN,LT

## 2023-10-31 PROCEDURE — 3079F DIAST BP 80-89 MM HG: CPT | Mod: CPTII,S$GLB,, | Performed by: PODIATRIST

## 2023-10-31 PROCEDURE — 99999 PR PBB SHADOW E&M-EST. PATIENT-LVL IV: ICD-10-PCS | Mod: PBBFAC,,, | Performed by: PODIATRIST

## 2023-10-31 PROCEDURE — 3077F SYST BP >= 140 MM HG: CPT | Mod: CPTII,S$GLB,, | Performed by: PODIATRIST

## 2023-10-31 PROCEDURE — 99214 PR OFFICE/OUTPT VISIT, EST, LEVL IV, 30-39 MIN: ICD-10-PCS | Mod: S$GLB,,, | Performed by: PODIATRIST

## 2023-10-31 PROCEDURE — 99214 OFFICE O/P EST MOD 30 MIN: CPT | Mod: S$GLB,,, | Performed by: PODIATRIST

## 2023-10-31 PROCEDURE — 73630 X-RAY EXAM OF FOOT: CPT | Mod: 26,LT,, | Performed by: RADIOLOGY

## 2023-10-31 PROCEDURE — 3008F PR BODY MASS INDEX (BMI) DOCUMENTED: ICD-10-PCS | Mod: CPTII,S$GLB,, | Performed by: PODIATRIST

## 2023-10-31 PROCEDURE — 1159F MED LIST DOCD IN RCRD: CPT | Mod: CPTII,S$GLB,, | Performed by: PODIATRIST

## 2023-10-31 PROCEDURE — 1159F PR MEDICATION LIST DOCUMENTED IN MEDICAL RECORD: ICD-10-PCS | Mod: CPTII,S$GLB,, | Performed by: PODIATRIST

## 2023-10-31 PROCEDURE — 1160F PR REVIEW ALL MEDS BY PRESCRIBER/CLIN PHARMACIST DOCUMENTED: ICD-10-PCS | Mod: CPTII,S$GLB,, | Performed by: PODIATRIST

## 2023-10-31 PROCEDURE — 3079F PR MOST RECENT DIASTOLIC BLOOD PRESSURE 80-89 MM HG: ICD-10-PCS | Mod: CPTII,S$GLB,, | Performed by: PODIATRIST

## 2023-10-31 PROCEDURE — 99999 PR PBB SHADOW E&M-EST. PATIENT-LVL IV: CPT | Mod: PBBFAC,,, | Performed by: PODIATRIST

## 2023-10-31 PROCEDURE — 73630 XR FOOT COMPLETE 3 VIEW LEFT: ICD-10-PCS | Mod: 26,LT,, | Performed by: RADIOLOGY

## 2023-10-31 PROCEDURE — 1160F RVW MEDS BY RX/DR IN RCRD: CPT | Mod: CPTII,S$GLB,, | Performed by: PODIATRIST

## 2023-10-31 PROCEDURE — 3077F PR MOST RECENT SYSTOLIC BLOOD PRESSURE >= 140 MM HG: ICD-10-PCS | Mod: CPTII,S$GLB,, | Performed by: PODIATRIST

## 2023-10-31 RX ORDER — TRAMADOL HYDROCHLORIDE 50 MG/1
50 TABLET ORAL EVERY 6 HOURS PRN
Qty: 30 TABLET | Refills: 0 | Status: SHIPPED | OUTPATIENT
Start: 2023-10-31 | End: 2023-11-10

## 2023-10-31 NOTE — PROGRESS NOTES
Subjective:      Patient ID: Rika June is a 58 y.o. female.    Chief Complaint: Foot Pain (Left foot )      Patient is a worker's compensation referral for chronic right ankle sprain which was initially treated at urgent care on 10/29/2020.  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  States that she has moderate amount of stiffness and swelling to the right ankle and the pain is aggravated by standing or walking.  She has not worn orthopedic boot or tendon physical therapy.  Recent MRI and CT confirmed nondisplaced posterior malleolus fracture of the right ankle.    04/22/2021:  Follow-up for nondisplaced posterior malleolus fracture of the right ankle x8 weeks.  She received a bone stimulator approximately a few days after her last visit has been using it daily as recommended.  She has been ambulating with the orthopedic boot and states that she has no pain while she is in a boot however she does have pain she takes the boot off and attempts to walk or she applies pressure to different points around her ankle.  She states that she still has stiffness and pain localized to the area.  Patient also states that she fell approximately 3 weeks ago while wearing the boot.  States she was descending down the stairs and landed towards the right knee.  She expressed that she did not have any pain during the fall or afterwards.    05/27/2021:  Follow-up for chronic right ankle pain with a history of delayed union of the posterior malleolar nondisplaced fracture.  She also has an osteochondral defect to the medial talar dome for which she is receiving offloading with an orthopedic boot.  She relates that she does not have much pain when she is at rest or walking with the orthopedic boot.  She does describe aching sensation when she tries to walk around her home without the boot on.  She also describes intermittent burning pain as increasing pointing to the medial  hindfoot/ankle area traveling along the bottom of the foot.  States that her pain is manageable unless she touches an area around the ankle.    07/29/2021:  Presents ambulating with her orthopedic boot to the right lower extremity.  She has no pain with ambulation or activity while using the boot.  She previously completed her nerve conduction study which was normal however cannot tolerate the EMG.  Relates that she does not want to miss any time from work because she fears that her employment is in jeopardy.  She is also requesting to have some the restrictions lifted so that she can perform her duties while at work.  She works as a supervisor in a corrections facility.      08/25/2021:  Presents today for reassessment of the right ankle.  States that she has been ambulating with the ankle brace and a tennis shoe and experiences no significant pain.  She is able to work and perform her regular duties at work with the ankle brace and is requesting removal of her previous restrictions.  States that she will experience stiffness with the 1st few steps in the morning stating pain is rated as 4/10 that will improve significantly with moving around.      08/29/2022:  Follow up for chronic right ankle pain.  It has been over a year since her last examination.  She relates that she has a feeling of instability to the right ankle like her ankle is going to give out when she walks.  She is afraid to place too much weight on her foot.  She wears her brace sometimes however with the current shoe gear that she wears at work her brace is not fit in it.  She works at a [employer].  She also complains of weakness to the right lower extremity.  Pain aggravated by increased periods of standing and/or walking to the right ankle.    09/26/2022:  Follow-up for right ankle pain following MRI.  Patient relates that she is unable to wear her ankle brace for tennis shoes for long periods of time at work.  States that the  "pain will increase with increased duration standing or walking.    2022:  Follow-up for chronic right ankle pain.  Patient is not wearing her ankle brace or compression stocking AMA.  She reports pain will wax and wane to the right ankle depending on her activity.  Notes that the pain and swelling to the right ankle resolve with rest.  She continues to work.  Wearing flip-flops today.  Relates that she worked last night.  She has not gone for fitting of right AFO as prescribed.    2023:  Follow-up for chronic right ankle pain.  She receives the [place] brace and has been wearing it at work.  Reports improvement in subjective pain while working which is when she has a significant flare-up in pain.  She is able to perform her duties and has no reduced ability to perform her activities of daily living.  She is ambulating with flip-flops today.    10/31/2023:  Follow-up for right foot injury which was initially treated at [facility] yesterday with left foot x-ray completed and read per radiology.  Imaging not directly available to us today.  Patient relates pain and swelling pointing to the lateral left midfoot region.  She was not dispensed any DME.  She is ambulating with Crocs.  Patient was also due for follow-up for chronic right ankle pain secondary to posttraumatic arthritis.  She wears an AFO while working which helps reduce her pain and reduce the incidence of flare-ups.    Vitals:    10/31/23 1434   BP: (!) 147/86   Pulse: 77   Weight: 74.8 kg (165 lb)   Height: 4' 11" (1.499 m)   PainSc: 10-Worst pain ever      Past Medical History:   Diagnosis Date    Allergic rhinitis     Allergy     Asthma        Past Surgical History:   Procedure Laterality Date     SECTION      TUBAL LIGATION         Family History   Problem Relation Age of Onset    Breast cancer Mother     Cancer Father         throat    Diabetes Maternal Grandmother     Ovarian cancer Neg Hx     Stroke Neg Hx     " Hypertension Neg Hx     Melanoma Neg Hx        Social History     Socioeconomic History    Marital status: Legally    Tobacco Use    Smoking status: Never    Smokeless tobacco: Never   Substance and Sexual Activity    Alcohol use: No    Drug use: No    Sexual activity: Yes     Partners: Male   Other Topics Concern    Are you pregnant or think you may be? No    Breast-feeding No       Current Outpatient Medications   Medication Sig Dispense Refill    albuterol (PROVENTIL) 2.5 mg /3 mL (0.083 %) nebulizer solution Take 3 mLs (2.5 mg total) by nebulization every 6 (six) hours as needed for Wheezing. 1 Box 0    albuterol (PROVENTIL/VENTOLIN HFA) 90 mcg/actuation inhaler Inhale 2 puffs into the lungs every 6 (six) hours as needed for Wheezing or Shortness of Breath. 18 g 2    fluticasone propionate (FLONASE) 50 mcg/actuation nasal spray SHAKE LIQUID AND USE 1 SPRAY(50 MCG) IN EACH NOSTRIL EVERY DAY 16 g 5    ibuprofen (ADVIL,MOTRIN) 800 MG tablet Take 1 tablet (800 mg total) by mouth every 6 (six) hours as needed for Pain. 60 tablet 1    leg brace (ANKLE BRACE) [facility] Dispense for right ankle 1 each 0    methocarbamoL (ROBAXIN) 500 MG Tab Take 1 tablet (500 mg total) by mouth 3 (three) times daily as needed (pain). 21 tablet 0    predniSONE (DELTASONE) 20 MG tablet Take 1 tablet (20 mg total) by mouth once daily. 4 tablet 0    triamcinolone acetonide 0.1% (KENALOG) 0.1 % ointment Apply topically 2 (two) times daily. 30 g 0    diclofenac sodium (VOLTAREN) 1 % Gel Apply 2 g topically 4 (four) times daily. for 10 days 100 g 5    olopatadine (PATANOL) 0.1 % ophthalmic solution Place 1 drop into both eyes 2 (two) times daily. (Patient not taking: Reported on 9/20/2021) 5 mL 2    traMADoL (ULTRAM) 50 mg tablet Take 1 tablet (50 mg total) by mouth every 6 (six) hours as needed for Pain. 30 tablet 0     No current facility-administered medications for this visit.       Review of patient's allergies indicates:   Allergen  Reactions    Hydrocodone-acetaminophen Nausea Only and Nausea And Vomiting     Other reaction(s): Nausea         Review of Systems   Constitutional: Negative for chills, fever and malaise/fatigue.   HENT:  Negative for congestion and hearing loss.    Cardiovascular:  Negative for chest pain, claudication and leg swelling.   Respiratory:  Negative for cough and shortness of breath.    Skin:  Negative for color change and poor wound healing.   Musculoskeletal:  Positive for joint pain, joint swelling and stiffness. Negative for back pain, muscle cramps and muscle weakness.   Gastrointestinal:  Negative for nausea and vomiting.   Neurological:  Negative for numbness, paresthesias and weakness.   Psychiatric/Behavioral:  Negative for altered mental status.            Objective:      Physical Exam  Constitutional:       General: She is not in acute distress.  Cardiovascular:      Pulses:           Dorsalis pedis pulses are 2+ on the left side.        Posterior tibial pulses are 2+ on the left side.   Musculoskeletal:      Comments: Limitation in active range of motion to the right ankle without pain however some mild audible crepitus heard.       Positive anterior drawer sign right ankle with moderate pain during testing.  There is some pain on palpation overlying the anterior lateral aspect of the ankle overlying the ATF ligament and inferior to lateral malleolus overlying the peroneals and CF ligament area.  Retro malleolar pain overlying the peroneal tendons right ankle which is aggravated by active resisted eversion and neutral and plantar flex position.  There is pain with stress inversion of the right ankle.  There is no pain with stress eversion appreciated.  There is pain on palpation overlying the posterior tibial tendon commencing posterior tibial malleolus and extending along the distal 1/3 of the tendon.  No pain with active resisted inversion.  Manual muscle strength 4/5 to right lower extremity comparison  to the left.    Mild pes planus foot structure with normal range of motion to the subtalar joint midtarsal joints right foot.    Moderate localized pain on palpation overlying the styloid process/base of the 5th metatarsal left foot and pain with midtarsal joint range motion and attempted active resisted eversion of the left foot.  Mild pain on palpation overlying the anterior lateral left ankle overlying the ATF ligament.  No pain with anterior drawer which is negative.  Mild pain with stress inversion of the left ankle directly overlying the styloid process region.  No pain on palpation along the course of the posterior tibial tendon left hindfoot/ankle.   Skin:     General: Skin is warm.      Capillary Refill: Capillary refill takes less than 2 seconds.      Findings: No ecchymosis or erythema.      Nails: There is no clubbing.   Neurological:      Mental Status: She is alert and oriented to person, place, and time.      Sensory: Sensation is intact.      Motor: Weakness present.             Assessment:       Encounter Diagnoses   Name Primary?    Fracture of base of fifth metatarsal bone, left, closed, initial encounter Yes    Chronic pain of right ankle     Post-traumatic arthritis of right ankle          Plan:       Rika was seen today for foot pain.    Diagnoses and all orders for this visit:    Fracture of base of fifth metatarsal bone, left, closed, initial encounter  -     X-Ray Foot Complete Left; Future  -     AIR CAST WALKER BOOT FOR HOME USE  -     traMADoL (ULTRAM) 50 mg tablet; Take 1 tablet (50 mg total) by mouth every 6 (six) hours as needed for Pain.    Chronic pain of right ankle    Post-traumatic arthritis of right ankle      I counseled the patient on her conditions, their implications and medical management.    Reviewed her left foot nonweightbearing x-ray noting minimal gapping less than 1 mm of a transverse fracture at the metaphyseal diaphyseal junction with intact lateral cortex  consistent with a June fracture.  We discussed conservative treatment consisting of immobilization in the cam boot with protective weight-bearing as tolerated.  Patient was instructed rest, ice and elevate p.r.n..      We will have patient follow up within 2 weeks to repeat the weight-bearing x-ray to ensure that there is no further gapping of the fracture site.    Patient will also bring her [facility] paperwork to be completed.    Patient given a prescription for tramadol to help manage her pain.   reviewed noting no current narcotic prescriptions.    RTC p.r.n. as discussed.    A portion of this note was generated by voice recognition software and may contain spelling and grammar errors.

## 2023-11-01 ENCOUNTER — TELEPHONE (OUTPATIENT)
Dept: PODIATRY | Facility: CLINIC | Age: 58
End: 2023-11-01
Payer: COMMERCIAL

## 2023-11-01 ENCOUNTER — PATIENT MESSAGE (OUTPATIENT)
Dept: PODIATRY | Facility: CLINIC | Age: 58
End: 2023-11-01
Payer: COMMERCIAL

## 2023-11-01 NOTE — TELEPHONE ENCOUNTER
Attempted to contact pt regarding forms to see if they are workers comp forms because I am unable to complete if so. Pt's listed cell phone number did not have voicemail set up. Message was sent this morning via portal as well regarding forms.

## 2023-11-08 ENCOUNTER — TELEPHONE (OUTPATIENT)
Dept: PODIATRY | Facility: CLINIC | Age: 58
End: 2023-11-08
Payer: COMMERCIAL

## 2023-11-08 NOTE — TELEPHONE ENCOUNTER
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 There was not a fax number or e-mail on forms, so pt stated she would  from office today. I informed Dr. Macario's staff that pt is coming to  forms. Forms scanned under .

## 2023-11-08 NOTE — TELEPHONE ENCOUNTER
[facility] paperwork complete and processed by Prasanth FIELDS [facility] coordinator. Per Prasanth she spoke with patient who is aware that her paperwork is ready and has requested that she will come pick it up. Completed forms place in envelope with pt's name and left with . No other needs voiced per Prasanth FIELDS.

## 2023-11-08 NOTE — TELEPHONE ENCOUNTER
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 Pt is wondering if the documents have been sent out and if her paper work is ready for . Please give her a call back soon.

## 2023-11-13 ENCOUNTER — HOSPITAL ENCOUNTER (OUTPATIENT)
Dept: RADIOLOGY | Facility: HOSPITAL | Age: 58
Discharge: HOME | End: 2023-11-13
Attending: PODIATRIST
Payer: COMMERCIAL

## 2023-11-13 ENCOUNTER — OFFICE VISIT (OUTPATIENT)
Dept: PODIATRY | Facility: CLINIC | Age: 58
End: 2023-11-13
Payer: COMMERCIAL

## 2023-11-13 VITALS
WEIGHT: 165 LBS | HEART RATE: 92 BPM | BODY MASS INDEX: 33.26 KG/M2 | DIASTOLIC BLOOD PRESSURE: 85 MMHG | SYSTOLIC BLOOD PRESSURE: 143 MMHG | HEIGHT: 59 IN

## 2023-11-13 DIAGNOSIS — S92.352D CLOSED DISPLACED FRACTURE OF FIFTH METATARSAL BONE OF LEFT FOOT WITH ROUTINE HEALING, SUBSEQUENT ENCOUNTER: Primary | ICD-10-CM

## 2023-11-13 DIAGNOSIS — S92.352D CLOSED DISPLACED FRACTURE OF FIFTH METATARSAL BONE OF LEFT FOOT WITH ROUTINE HEALING, SUBSEQUENT ENCOUNTER: ICD-10-CM

## 2023-11-13 PROCEDURE — 1160F RVW MEDS BY RX/DR IN RCRD: CPT | Mod: CPTII,S$GLB,, | Performed by: PODIATRIST

## 2023-11-13 PROCEDURE — 1159F MED LIST DOCD IN RCRD: CPT | Mod: CPTII,S$GLB,, | Performed by: PODIATRIST

## 2023-11-13 PROCEDURE — 99213 PR OFFICE/OUTPT VISIT, EST, LEVL III, 20-29 MIN: ICD-10-PCS | Mod: S$GLB,,, | Performed by: PODIATRIST

## 2023-11-13 PROCEDURE — 1159F PR MEDICATION LIST DOCUMENTED IN MEDICAL RECORD: ICD-10-PCS | Mod: CPTII,S$GLB,, | Performed by: PODIATRIST

## 2023-11-13 PROCEDURE — 99999 PR PBB SHADOW E&M-EST. PATIENT-LVL IV: ICD-10-PCS | Mod: PBBFAC,,, | Performed by: PODIATRIST

## 2023-11-13 PROCEDURE — 3077F SYST BP >= 140 MM HG: CPT | Mod: CPTII,S$GLB,, | Performed by: PODIATRIST

## 2023-11-13 PROCEDURE — 1160F PR REVIEW ALL MEDS BY PRESCRIBER/CLIN PHARMACIST DOCUMENTED: ICD-10-PCS | Mod: CPTII,S$GLB,, | Performed by: PODIATRIST

## 2023-11-13 PROCEDURE — 3079F PR MOST RECENT DIASTOLIC BLOOD PRESSURE 80-89 MM HG: ICD-10-PCS | Mod: CPTII,S$GLB,, | Performed by: PODIATRIST

## 2023-11-13 PROCEDURE — 3079F DIAST BP 80-89 MM HG: CPT | Mod: CPTII,S$GLB,, | Performed by: PODIATRIST

## 2023-11-13 PROCEDURE — 3077F PR MOST RECENT SYSTOLIC BLOOD PRESSURE >= 140 MM HG: ICD-10-PCS | Mod: CPTII,S$GLB,, | Performed by: PODIATRIST

## 2023-11-13 PROCEDURE — 73630 X-RAY EXAM OF FOOT: CPT | Mod: 26,LT,, | Performed by: RADIOLOGY

## 2023-11-13 PROCEDURE — 3008F PR BODY MASS INDEX (BMI) DOCUMENTED: ICD-10-PCS | Mod: CPTII,S$GLB,, | Performed by: PODIATRIST

## 2023-11-13 PROCEDURE — 99999 PR PBB SHADOW E&M-EST. PATIENT-LVL IV: CPT | Mod: PBBFAC,,, | Performed by: PODIATRIST

## 2023-11-13 PROCEDURE — 3008F BODY MASS INDEX DOCD: CPT | Mod: CPTII,S$GLB,, | Performed by: PODIATRIST

## 2023-11-13 PROCEDURE — 73630 X-RAY EXAM OF FOOT: CPT | Mod: TC,PN,LT

## 2023-11-13 PROCEDURE — 99213 OFFICE O/P EST LOW 20 MIN: CPT | Mod: S$GLB,,, | Performed by: PODIATRIST

## 2023-11-13 PROCEDURE — 73630 XR FOOT COMPLETE 3 VIEW LEFT: ICD-10-PCS | Mod: 26,LT,, | Performed by: RADIOLOGY

## 2023-11-13 RX ORDER — ASPIRIN 325 MG
50000 TABLET, DELAYED RELEASE (ENTERIC COATED) ORAL
Qty: 12 CAPSULE | Refills: 2 | Status: SHIPPED | OUTPATIENT
Start: 2023-11-13 | End: 2024-02-29 | Stop reason: SDUPTHER

## 2023-11-13 NOTE — PROGRESS NOTES
Subjective:      Patient ID: Rika June is a 58 y.o. female.    Chief Complaint: Foot Pain      Patient is a worker's compensation referral for chronic right ankle sprain which was initially treated at urgent care on 10/29/2020.  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  States that she has moderate amount of stiffness and swelling to the right ankle and the pain is aggravated by standing or walking.  She has not worn orthopedic boot or tendon physical therapy.  Recent MRI and CT confirmed nondisplaced posterior malleolus fracture of the right ankle.    04/22/2021:  Follow-up for nondisplaced posterior malleolus fracture of the right ankle x8 weeks.  She received a bone stimulator approximately a few days after her last visit has been using it daily as recommended.  She has been ambulating with the orthopedic boot and states that she has no pain while she is in a boot however she does have pain she takes the boot off and attempts to walk or she applies pressure to different points around her ankle.  She states that she still has stiffness and pain localized to the area.  Patient also states that she fell approximately 3 weeks ago while wearing the boot.  States she was descending down the stairs and landed towards the right knee.  She expressed that she did not have any pain during the fall or afterwards.    05/27/2021:  Follow-up for chronic right ankle pain with a history of delayed union of the posterior malleolar nondisplaced fracture.  She also has an osteochondral defect to the medial talar dome for which she is receiving offloading with an orthopedic boot.  She relates that she does not have much pain when she is at rest or walking with the orthopedic boot.  She does describe aching sensation when she tries to walk around her home without the boot on.  She also describes intermittent burning pain as increasing pointing to the medial hindfoot/ankle area  traveling along the bottom of the foot.  States that her pain is manageable unless she touches an area around the ankle.    07/29/2021:  Presents ambulating with her orthopedic boot to the right lower extremity.  She has no pain with ambulation or activity while using the boot.  She previously completed her nerve conduction study which was normal however cannot tolerate the EMG.  Relates that she does not want to miss any time from work because she fears that her employment is in jeopardy.  She is also requesting to have some the restrictions lifted so that she can perform her duties while at work.  She works as a supervisor in a corrections facility.      08/25/2021:  Presents today for reassessment of the right ankle.  States that she has been ambulating with the ankle brace and a tennis shoe and experiences no significant pain.  She is able to work and perform her regular duties at work with the ankle brace and is requesting removal of her previous restrictions.  States that she will experience stiffness with the 1st few steps in the morning stating pain is rated as 4/10 that will improve significantly with moving around.      08/29/2022:  Follow up for chronic right ankle pain.  It has been over a year since her last examination.  She relates that she has a feeling of instability to the right ankle like her ankle is going to give out when she walks.  She is afraid to place too much weight on her foot.  She wears her brace sometimes however with the current shoe gear that she wears at work her brace is not fit in it.  She works at a [employer].  She also complains of weakness to the right lower extremity.  Pain aggravated by increased periods of standing and/or walking to the right ankle.    09/26/2022:  Follow-up for right ankle pain following MRI.  Patient relates that she is unable to wear her ankle brace for tennis shoes for long periods of time at work.  States that the pain will increase  "with increased duration standing or walking.    11/28/2022:  Follow-up for chronic right ankle pain.  Patient is not wearing her ankle brace or compression stocking AMA.  She reports pain will wax and wane to the right ankle depending on her activity.  Notes that the pain and swelling to the right ankle resolve with rest.  She continues to work.  Wearing flip-flops today.  Relates that she worked last night.  She has not gone for fitting of right AFO as prescribed.    02/28/2023:  Follow-up for chronic right ankle pain.  She receives the [place] brace and has been wearing it at work.  Reports improvement in subjective pain while working which is when she has a significant flare-up in pain.  She is able to perform her duties and has no reduced ability to perform her activities of daily living.  She is ambulating with flip-flops today.    10/31/2023:  Follow-up for right foot injury which was initially treated at [facility] yesterday with left foot x-ray completed and read per radiology.  Imaging not directly available to us today.  Patient relates pain and swelling pointing to the lateral left midfoot region.  She was not dispensed any DME.  She is ambulating with Crocs.  Patient was also due for follow-up for chronic right ankle pain secondary to posttraumatic arthritis.  She wears an AFO while working which helps reduce her pain and reduce the incidence of flare-ups.    11/13/2023:  Follow-up for 5th metatarsal base fracture left foot x2 weeks.  Notes that she gets aching pain throughout this area at rest.  She is been performing minimal activity with the Cam boot and resting as advised.  She is no longer on vitamin-D when questioned.  She does have a history of vitamin-D deficiency.    Vitals:    11/13/23 0952   BP: (!) 143/85   Pulse: 92   Weight: 74.8 kg (165 lb)   Height: 4' 11" (1.499 m)   PainSc:   6      Past Medical History:   Diagnosis Date    Allergic rhinitis     Allergy     Asthma  "       Past Surgical History:   Procedure Laterality Date     SECTION      TUBAL LIGATION         Family History   Problem Relation Age of Onset    Breast cancer Mother     Cancer Father         throat    Diabetes Maternal Grandmother     Ovarian cancer Neg Hx     Stroke Neg Hx     Hypertension Neg Hx     Melanoma Neg Hx        Social History     Socioeconomic History    Marital status: Legally    Tobacco Use    Smoking status: Never    Smokeless tobacco: Never   Substance and Sexual Activity    Alcohol use: No    Drug use: No    Sexual activity: Yes     Partners: Male   Other Topics Concern    Are you pregnant or think you may be? No    Breast-feeding No       Current Outpatient Medications   Medication Sig Dispense Refill    albuterol (PROVENTIL) 2.5 mg /3 mL (0.083 %) nebulizer solution Take 3 mLs (2.5 mg total) by nebulization every 6 (six) hours as needed for Wheezing. 1 Box 0    albuterol (PROVENTIL/VENTOLIN HFA) 90 mcg/actuation inhaler Inhale 2 puffs into the lungs every 6 (six) hours as needed for Wheezing or Shortness of Breath. 18 g 2    fluticasone propionate (FLONASE) 50 mcg/actuation nasal spray SHAKE LIQUID AND USE 1 SPRAY(50 MCG) IN EACH NOSTRIL EVERY DAY 16 g 5    ibuprofen (ADVIL,MOTRIN) 800 MG tablet Take 1 tablet (800 mg total) by mouth every 6 (six) hours as needed for Pain. 60 tablet 1    leg brace (ANKLE BRACE) Misc Dispense for right ankle 1 each 0    methocarbamoL (ROBAXIN) 500 MG Tab Take 1 tablet (500 mg total) by mouth 3 (three) times daily as needed (pain). 21 tablet 0    predniSONE (DELTASONE) 20 MG tablet Take 1 tablet (20 mg total) by mouth once daily. 4 tablet 0    triamcinolone acetonide 0.1% (KENALOG) 0.1 % ointment Apply topically 2 (two) times daily. 30 g 0    cholecalciferol, vitamin D3, 1,250 mcg (50,000 unit) capsule Take 1 capsule (50,000 Units total) by mouth every 7 days. 12 capsule 2    diclofenac sodium (VOLTAREN) 1 % Gel Apply 2 g topically 4 (four) times  daily. for 10 days 100 g 5    olopatadine (PATANOL) 0.1 % ophthalmic solution Place 1 drop into both eyes 2 (two) times daily. (Patient not taking: Reported on 9/20/2021) 5 mL 2     No current facility-administered medications for this visit.       Review of patient's allergies indicates:   Allergen Reactions    Hydrocodone-acetaminophen Nausea Only and Nausea And Vomiting     Other reaction(s): Nausea         Review of Systems   Constitutional: Negative for chills, fever and malaise/fatigue.   HENT:  Negative for congestion and hearing loss.    Cardiovascular:  Negative for chest pain, claudication and leg swelling.   Respiratory:  Negative for cough and shortness of breath.    Skin:  Negative for color change and poor wound healing.   Musculoskeletal:  Positive for joint pain, joint swelling and stiffness. Negative for back pain, muscle cramps and muscle weakness.   Gastrointestinal:  Negative for nausea and vomiting.   Neurological:  Negative for numbness, paresthesias and weakness.   Psychiatric/Behavioral:  Negative for altered mental status.            Objective:      Physical Exam  Constitutional:       General: She is not in acute distress.  Cardiovascular:      Pulses:           Dorsalis pedis pulses are 2+ on the left side.        Posterior tibial pulses are 2+ on the left side.   Musculoskeletal:      Comments: Limitation in active range of motion to the right ankle without pain however some mild audible crepitus heard.       Positive anterior drawer sign right ankle with moderate pain during testing.  There is some pain on palpation overlying the anterior lateral aspect of the ankle overlying the ATF ligament and inferior to lateral malleolus overlying the peroneals and CF ligament area.  Retro malleolar pain overlying the peroneal tendons right ankle which is aggravated by active resisted eversion and neutral and plantar flex position.  There is pain with stress inversion of the right ankle.  There is  no pain with stress eversion appreciated.  There is pain on palpation overlying the posterior tibial tendon commencing posterior tibial malleolus and extending along the distal 1/3 of the tendon.  No pain with active resisted inversion.  Manual muscle strength 4/5 to right lower extremity comparison to the left.    Mild pes planus foot structure with normal range of motion to the subtalar joint midtarsal joints right foot.    Mild to moderate localized pain on palpation overlying the styloid process/base of the 5th metatarsal left foot and pain with midtarsal joint range motion and attempted active resisted eversion of the left foot.  Mild pain on palpation overlying the anterior lateral left ankle overlying the ATF ligament.  No pain with anterior drawer which is negative.  Mild pain with stress inversion of the left ankle directly overlying the styloid process region.  No pain on palpation along the course of the posterior tibial tendon left hindfoot/ankle.   Skin:     General: Skin is warm.      Capillary Refill: Capillary refill takes less than 2 seconds.      Findings: No ecchymosis or erythema.      Nails: There is no clubbing.   Neurological:      Mental Status: She is alert and oriented to person, place, and time.      Sensory: Sensation is intact.      Motor: Weakness present.             Assessment:       Encounter Diagnosis   Name Primary?    Closed displaced fracture of fifth metatarsal bone of left foot with routine healing, subsequent encounter Yes         Plan:       Rika was seen today for foot pain.    Diagnoses and all orders for this visit:    Closed displaced fracture of fifth metatarsal bone of left foot with routine healing, subsequent encounter  -     X-Ray Foot Complete Left; Future  -     X-Ray Foot Complete Left; Future    Other orders  -     cholecalciferol, vitamin D3, 1,250 mcg (50,000 unit) capsule; Take 1 capsule (50,000 Units total) by mouth every 7 days.      I counseled the  patient on her conditions, their implications and medical management.    Reviewed her left foot nonweightbearing x-ray noting minimal gapping less than 1 mm of a transverse fracture at the metaphyseal diaphyseal junction with intact lateral cortex consistent with a June fracture.  No change compared to the previous x-ray indicating stable fracture pattern.  Continue with activity and behavior modifications.  Continue protective weight-bearing with the cam boot.      RTC within 1 month for follow-up left foot x-ray or p.r.n. as discussed.  We will initiate vitamin D3 replacement per the order above.     Assisted per [facility] PGY 3    A portion of this note was generated by voice recognition software and may contain spelling and grammar errors.

## 2023-12-19 ENCOUNTER — OFFICE VISIT (OUTPATIENT)
Dept: PODIATRY | Facility: CLINIC | Age: 58
End: 2023-12-19
Payer: COMMERCIAL

## 2023-12-19 ENCOUNTER — HOSPITAL ENCOUNTER (OUTPATIENT)
Dept: RADIOLOGY | Facility: HOSPITAL | Age: 58
Discharge: HOME | End: 2023-12-19
Attending: PODIATRIST
Payer: COMMERCIAL

## 2023-12-19 VITALS
BODY MASS INDEX: 33.33 KG/M2 | HEIGHT: 59 IN | SYSTOLIC BLOOD PRESSURE: 162 MMHG | DIASTOLIC BLOOD PRESSURE: 92 MMHG | HEART RATE: 80 BPM

## 2023-12-19 DIAGNOSIS — S92.302G DELAYED UNION OF FRACTURE OF METATARSAL BONE OF LEFT FOOT: Primary | ICD-10-CM

## 2023-12-19 DIAGNOSIS — S92.352D CLOSED DISPLACED FRACTURE OF FIFTH METATARSAL BONE OF LEFT FOOT WITH ROUTINE HEALING, SUBSEQUENT ENCOUNTER: ICD-10-CM

## 2023-12-19 PROCEDURE — 1160F RVW MEDS BY RX/DR IN RCRD: CPT | Mod: CPTII,S$GLB,, | Performed by: PODIATRIST

## 2023-12-19 PROCEDURE — 3077F SYST BP >= 140 MM HG: CPT | Mod: CPTII,S$GLB,, | Performed by: PODIATRIST

## 2023-12-19 PROCEDURE — 3080F DIAST BP >= 90 MM HG: CPT | Mod: CPTII,S$GLB,, | Performed by: PODIATRIST

## 2023-12-19 PROCEDURE — 3077F PR MOST RECENT SYSTOLIC BLOOD PRESSURE >= 140 MM HG: ICD-10-PCS | Mod: CPTII,S$GLB,, | Performed by: PODIATRIST

## 2023-12-19 PROCEDURE — 1159F MED LIST DOCD IN RCRD: CPT | Mod: CPTII,S$GLB,, | Performed by: PODIATRIST

## 2023-12-19 PROCEDURE — 3008F PR BODY MASS INDEX (BMI) DOCUMENTED: ICD-10-PCS | Mod: CPTII,S$GLB,, | Performed by: PODIATRIST

## 2023-12-19 PROCEDURE — 3008F BODY MASS INDEX DOCD: CPT | Mod: CPTII,S$GLB,, | Performed by: PODIATRIST

## 2023-12-19 PROCEDURE — 1160F PR REVIEW ALL MEDS BY PRESCRIBER/CLIN PHARMACIST DOCUMENTED: ICD-10-PCS | Mod: CPTII,S$GLB,, | Performed by: PODIATRIST

## 2023-12-19 PROCEDURE — 73630 X-RAY EXAM OF FOOT: CPT | Mod: 26,LT,, | Performed by: RADIOLOGY

## 2023-12-19 PROCEDURE — 99213 PR OFFICE/OUTPT VISIT, EST, LEVL III, 20-29 MIN: ICD-10-PCS | Mod: S$GLB,,, | Performed by: PODIATRIST

## 2023-12-19 PROCEDURE — 1159F PR MEDICATION LIST DOCUMENTED IN MEDICAL RECORD: ICD-10-PCS | Mod: CPTII,S$GLB,, | Performed by: PODIATRIST

## 2023-12-19 PROCEDURE — 99999 PR PBB SHADOW E&M-EST. PATIENT-LVL IV: ICD-10-PCS | Mod: PBBFAC,,, | Performed by: PODIATRIST

## 2023-12-19 PROCEDURE — 99213 OFFICE O/P EST LOW 20 MIN: CPT | Mod: S$GLB,,, | Performed by: PODIATRIST

## 2023-12-19 PROCEDURE — 73630 X-RAY EXAM OF FOOT: CPT | Mod: TC,PN,LT

## 2023-12-19 PROCEDURE — 73630 XR FOOT COMPLETE 3 VIEW LEFT: ICD-10-PCS | Mod: 26,LT,, | Performed by: RADIOLOGY

## 2023-12-19 PROCEDURE — 3080F PR MOST RECENT DIASTOLIC BLOOD PRESSURE >= 90 MM HG: ICD-10-PCS | Mod: CPTII,S$GLB,, | Performed by: PODIATRIST

## 2023-12-19 PROCEDURE — 99999 PR PBB SHADOW E&M-EST. PATIENT-LVL IV: CPT | Mod: PBBFAC,,, | Performed by: PODIATRIST

## 2023-12-19 RX ORDER — CALCIUM CARBONATE 500(1250)
1 TABLET ORAL DAILY
Qty: 90 TABLET | Refills: 3 | Status: SHIPPED | OUTPATIENT
Start: 2023-12-19 | End: 2024-12-18

## 2023-12-19 NOTE — PROGRESS NOTES
Subjective:      Patient ID: Rika June is a 58 y.o. female.    Chief Complaint: Foot Problem (Left ft., xray done, ), Follow-up, and Foot Pain (A little)      Patient is a worker's compensation referral for chronic right ankle sprain which was initially treated at urgent care on 10/29/2020.  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  States that she has moderate amount of stiffness and swelling to the right ankle and the pain is aggravated by standing or walking.  She has not worn orthopedic boot or tendon physical therapy.  Recent MRI and CT confirmed nondisplaced posterior malleolus fracture of the right ankle.    04/22/2021:  Follow-up for nondisplaced posterior malleolus fracture of the right ankle x8 weeks.  She received a bone stimulator approximately a few days after her last visit has been using it daily as recommended.  She has been ambulating with the orthopedic boot and states that she has no pain while she is in a boot however she does have pain she takes the boot off and attempts to walk or she applies pressure to different points around her ankle.  She states that she still has stiffness and pain localized to the area.  Patient also states that she fell approximately 3 weeks ago while wearing the boot.  States she was descending down the stairs and landed towards the right knee.  She expressed that she did not have any pain during the fall or afterwards.    05/27/2021:  Follow-up for chronic right ankle pain with a history of delayed union of the posterior malleolar nondisplaced fracture.  She also has an osteochondral defect to the medial talar dome for which she is receiving offloading with an orthopedic boot.  She relates that she does not have much pain when she is at rest or walking with the orthopedic boot.  She does describe aching sensation when she tries to walk around her home without the boot on.  She also describes intermittent  burning pain as increasing pointing to the medial hindfoot/ankle area traveling along the bottom of the foot.  States that her pain is manageable unless she touches an area around the ankle.    07/29/2021:  Presents ambulating with her orthopedic boot to the right lower extremity.  She has no pain with ambulation or activity while using the boot.  She previously completed her nerve conduction study which was normal however cannot tolerate the EMG.  Relates that she does not want to miss any time from work because she fears that her employment is in jeopardy.  She is also requesting to have some the restrictions lifted so that she can perform her duties while at work.  She works as a supervisor in a corrections facility.      08/25/2021:  Presents today for reassessment of the right ankle.  States that she has been ambulating with the ankle brace and a tennis shoe and experiences no significant pain.  She is able to work and perform her regular duties at work with the ankle brace and is requesting removal of her previous restrictions.  States that she will experience stiffness with the 1st few steps in the morning stating pain is rated as 4/10 that will improve significantly with moving around.      08/29/2022:  Follow up for chronic right ankle pain.  It has been over a year since her last examination.  She relates that she has a feeling of instability to the right ankle like her ankle is going to give out when she walks.  She is afraid to place too much weight on her foot.  She wears her brace sometimes however with the current shoe gear that she wears at work her brace is not fit in it.  She works at a [employer].  She also complains of weakness to the right lower extremity.  Pain aggravated by increased periods of standing and/or walking to the right ankle.    09/26/2022:  Follow-up for right ankle pain following MRI.  Patient relates that she is unable to wear her ankle brace for tennis shoes  for long periods of time at work.  States that the pain will increase with increased duration standing or walking.    11/28/2022:  Follow-up for chronic right ankle pain.  Patient is not wearing her ankle brace or compression stocking AMA.  She reports pain will wax and wane to the right ankle depending on her activity.  Notes that the pain and swelling to the right ankle resolve with rest.  She continues to work.  Wearing flip-flops today.  Relates that she worked last night.  She has not gone for fitting of right AFO as prescribed.    02/28/2023:  Follow-up for chronic right ankle pain.  She receives the [place] brace and has been wearing it at work.  Reports improvement in subjective pain while working which is when she has a significant flare-up in pain.  She is able to perform her duties and has no reduced ability to perform her activities of daily living.  She is ambulating with flip-flops today.    10/31/2023:  Follow-up for right foot injury which was initially treated at [facility] yesterday with left foot x-ray completed and read per radiology.  Imaging not directly available to us today.  Patient relates pain and swelling pointing to the lateral left midfoot region.  She was not dispensed any DME.  She is ambulating with Crocs.  Patient was also due for follow-up for chronic right ankle pain secondary to posttraumatic arthritis.  She wears an AFO while working which helps reduce her pain and reduce the incidence of flare-ups.    11/13/2023:  Follow-up for 5th metatarsal base fracture left foot x2 weeks.  Notes that she gets aching pain throughout this area at rest.  She is been performing minimal activity with the Cam boot and resting as advised.  She is no longer on vitamin-D when questioned.  She does have a history of vitamin-D deficiency.    12/19/2023:  Follow-up for 5th metatarsal base fracture x6 weeks.  Offloaded with Cam boot.  She has stopped working and is home with  "restrictions.    Vitals:    23 1435   BP: (!) 162/92   Pulse: 80   Height: 4' 11" (1.499 m)   PainSc:   4      Past Medical History:   Diagnosis Date    Allergic rhinitis     Allergy     Asthma        Past Surgical History:   Procedure Laterality Date     SECTION      TUBAL LIGATION         Family History   Problem Relation Age of Onset    Breast cancer Mother     Cancer Father         throat    Diabetes Maternal Grandmother     Ovarian cancer Neg Hx     Stroke Neg Hx     Hypertension Neg Hx     Melanoma Neg Hx        Social History     Socioeconomic History    Marital status: Legally    Tobacco Use    Smoking status: Never    Smokeless tobacco: Never   Substance and Sexual Activity    Alcohol use: No    Drug use: No    Sexual activity: Yes     Partners: Male   Other Topics Concern    Are you pregnant or think you may be? No    Breast-feeding No       Current Outpatient Medications   Medication Sig Dispense Refill    albuterol (PROVENTIL) 2.5 mg /3 mL (0.083 %) nebulizer solution Take 3 mLs (2.5 mg total) by nebulization every 6 (six) hours as needed for Wheezing. 1 Box 0    albuterol (PROVENTIL/VENTOLIN HFA) 90 mcg/actuation inhaler Inhale 2 puffs into the lungs every 6 (six) hours as needed for Wheezing or Shortness of Breath. 18 g 2    cholecalciferol, vitamin D3, 1,250 mcg (50,000 unit) capsule Take 1 capsule (50,000 Units total) by mouth every 7 days. 12 capsule 2    fluticasone propionate (FLONASE) 50 mcg/actuation nasal spray SHAKE LIQUID AND USE 1 SPRAY(50 MCG) IN EACH NOSTRIL EVERY DAY 16 g 5    ibuprofen (ADVIL,MOTRIN) 800 MG tablet Take 1 tablet (800 mg total) by mouth every 6 (six) hours as needed for Pain. 60 tablet 1    leg brace (ANKLE BRACE) [facility] Dispense for right ankle 1 each 0    methocarbamoL (ROBAXIN) 500 MG Tab Take 1 tablet (500 mg total) by mouth 3 (three) times daily as needed (pain). 21 tablet 0    predniSONE (DELTASONE) 20 MG tablet Take 1 tablet (20 mg total) by " mouth once daily. 4 tablet 0    triamcinolone acetonide 0.1% (KENALOG) 0.1 % ointment Apply topically 2 (two) times daily. 30 g 0    calcium carbonate (OS-SHARMILA) 500 mg calcium (1,250 mg) tablet Take 1 tablet (500 mg total) by mouth once daily. 90 tablet 3    diclofenac sodium (VOLTAREN) 1 % Gel Apply 2 g topically 4 (four) times daily. for 10 days 100 g 5    olopatadine (PATANOL) 0.1 % ophthalmic solution Place 1 drop into both eyes 2 (two) times daily. (Patient not taking: Reported on 9/20/2021) 5 mL 2    vitamin D3-vitamin K2 137.5-200 mcg Tab Take one by mouth daily 90 tablet 2     No current facility-administered medications for this visit.       Review of patient's allergies indicates:   Allergen Reactions    Hydrocodone-acetaminophen Nausea Only and Nausea And Vomiting     Other reaction(s): Nausea         Review of Systems   Constitutional: Negative for chills, fever and malaise/fatigue.   HENT:  Negative for congestion and hearing loss.    Cardiovascular:  Negative for chest pain, claudication and leg swelling.   Respiratory:  Negative for cough and shortness of breath.    Skin:  Negative for color change and poor wound healing.   Musculoskeletal:  Positive for joint pain, joint swelling and stiffness. Negative for back pain, muscle cramps and muscle weakness.   Gastrointestinal:  Negative for nausea and vomiting.   Neurological:  Negative for numbness, paresthesias and weakness.   Psychiatric/Behavioral:  Negative for altered mental status.            Objective:      Physical Exam  Constitutional:       General: She is not in acute distress.  Cardiovascular:      Pulses:           Dorsalis pedis pulses are 2+ on the left side.        Posterior tibial pulses are 2+ on the left side.   Musculoskeletal:      Comments: Limitation in active range of motion to the right ankle without pain however some mild audible crepitus heard.       Mild pes planus foot structure with normal range of motion to the subtalar  joint midtarsal joints right foot.    Mild localized pain on palpation overlying the styloid process/base of the 5th metatarsal left foot and pain with midtarsal joint range motion and attempted active resisted eversion of the left foot.     No pain with anterior drawer which is negative.  Mild pain with stress inversion of the left ankle directly overlying the styloid process region.  No pain on palpation along the course of the posterior tibial tendon left hindfoot/ankle.   Skin:     General: Skin is warm.      Capillary Refill: Capillary refill takes less than 2 seconds.      Findings: No ecchymosis or erythema.      Nails: There is no clubbing.   Neurological:      Mental Status: She is alert and oriented to person, place, and time.      Sensory: Sensation is intact.      Motor: Weakness present.             Assessment:       Encounter Diagnosis   Name Primary?    Delayed union of fracture of metatarsal bone of left foot Yes         Plan:       Rika was seen today for foot problem, follow-up and foot pain.    Diagnoses and all orders for this visit:    Delayed union of fracture of metatarsal bone of left foot  -     X-Ray Foot Complete Left; Future    Other orders  -     vitamin D3-vitamin K2 137.5-200 mcg Tab; Take one by mouth daily  -     calcium carbonate (OS-SHARMILA) 500 mg calcium (1,250 mg) tablet; Take 1 tablet (500 mg total) by mouth once daily.      I counseled the patient on her conditions, their implications and medical management.    Reviewed left foot x-ray noting nonunited 5th metatarsal base fracture near the metaphyseal diaphyseal junction with slight increasing gapping and some mild comminution along the medial cortex.    We discussed that she has delayed union of the fracture site.  Recommend daily vitamin D3 and K2 with daily calcium in addition to once weekly vitamin D3 supplement.  Continue protective weight-bearing with Cam boot.  Reviewed activity restrictions and modifications.  Patient  is not able to work at this time and letter was dispensed.      RTC within 1 month for follow-up left foot x-ray.  If insufficient healing then will require exogen bone stimulator.  If not approved consider surgical intervention for ORIF with bone grafting and plating.     Assisted per Johnson Cabral DPM PGY 3    A portion of this note was generated by voice recognition software and may contain spelling and grammar errors.

## 2024-01-12 ENCOUNTER — TELEPHONE (OUTPATIENT)
Dept: PODIATRY | Facility: CLINIC | Age: 59
End: 2024-01-12
Payer: COMMERCIAL

## 2024-01-12 NOTE — TELEPHONE ENCOUNTER
Called patient to make her aware that Dr. Macario has not filled out her FMLA forms at this time.  Patient understands.      Kimberly

## 2024-01-17 ENCOUNTER — PATIENT MESSAGE (OUTPATIENT)
Dept: PODIATRY | Facility: CLINIC | Age: 59
End: 2024-01-17
Payer: COMMERCIAL

## 2024-01-19 ENCOUNTER — TELEPHONE (OUTPATIENT)
Dept: PODIATRY | Facility: CLINIC | Age: 59
End: 2024-01-19
Payer: COMMERCIAL

## 2024-01-19 NOTE — TELEPHONE ENCOUNTER
Contacted pt to inform her FMLA Forms were complete. Pt stated she wanted to pick forms up from Dr. Macario's office. Message sent to his staff.

## 2024-01-29 ENCOUNTER — HOSPITAL ENCOUNTER (OUTPATIENT)
Dept: RADIOLOGY | Facility: HOSPITAL | Age: 59
Discharge: HOME | End: 2024-01-29
Attending: PODIATRIST
Payer: COMMERCIAL

## 2024-01-29 ENCOUNTER — OFFICE VISIT (OUTPATIENT)
Dept: PODIATRY | Facility: CLINIC | Age: 59
End: 2024-01-29
Payer: COMMERCIAL

## 2024-01-29 VITALS
SYSTOLIC BLOOD PRESSURE: 145 MMHG | WEIGHT: 165 LBS | HEIGHT: 59 IN | HEART RATE: 82 BPM | BODY MASS INDEX: 33.26 KG/M2 | DIASTOLIC BLOOD PRESSURE: 83 MMHG

## 2024-01-29 DIAGNOSIS — S92.302K: Primary | ICD-10-CM

## 2024-01-29 DIAGNOSIS — S92.302G DELAYED UNION OF FRACTURE OF METATARSAL BONE OF LEFT FOOT: ICD-10-CM

## 2024-01-29 DIAGNOSIS — B35.1 ONYCHOMYCOSIS: ICD-10-CM

## 2024-01-29 PROCEDURE — 99999 PR PBB SHADOW E&M-EST. PATIENT-LVL IV: CPT | Mod: PBBFAC,,, | Performed by: PODIATRIST

## 2024-01-29 PROCEDURE — 73630 X-RAY EXAM OF FOOT: CPT | Mod: 26,LT,, | Performed by: RADIOLOGY

## 2024-01-29 PROCEDURE — 1159F MED LIST DOCD IN RCRD: CPT | Mod: CPTII,S$GLB,, | Performed by: PODIATRIST

## 2024-01-29 PROCEDURE — 3079F DIAST BP 80-89 MM HG: CPT | Mod: CPTII,S$GLB,, | Performed by: PODIATRIST

## 2024-01-29 PROCEDURE — 3008F BODY MASS INDEX DOCD: CPT | Mod: CPTII,S$GLB,, | Performed by: PODIATRIST

## 2024-01-29 PROCEDURE — 1160F RVW MEDS BY RX/DR IN RCRD: CPT | Mod: CPTII,S$GLB,, | Performed by: PODIATRIST

## 2024-01-29 PROCEDURE — 99214 OFFICE O/P EST MOD 30 MIN: CPT | Mod: S$GLB,,, | Performed by: PODIATRIST

## 2024-01-29 PROCEDURE — 73630 X-RAY EXAM OF FOOT: CPT | Mod: TC,PN,LT

## 2024-01-29 PROCEDURE — 87101 SKIN FUNGI CULTURE: CPT | Performed by: PODIATRIST

## 2024-01-29 PROCEDURE — 3077F SYST BP >= 140 MM HG: CPT | Mod: CPTII,S$GLB,, | Performed by: PODIATRIST

## 2024-01-29 NOTE — PROGRESS NOTES
Subjective:      Patient ID: Rika June is a 58 y.o. female.    Chief Complaint: Follow-up      Patient is a worker's compensation referral for chronic right ankle sprain which was initially treated at urgent care on 10/29/2020.  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  States that she has moderate amount of stiffness and swelling to the right ankle and the pain is aggravated by standing or walking.  She has not worn orthopedic boot or tendon physical therapy.  Recent MRI and CT confirmed nondisplaced posterior malleolus fracture of the right ankle.    04/22/2021:  Follow-up for nondisplaced posterior malleolus fracture of the right ankle x8 weeks.  She received a bone stimulator approximately a few days after her last visit has been using it daily as recommended.  She has been ambulating with the orthopedic boot and states that she has no pain while she is in a boot however she does have pain she takes the boot off and attempts to walk or she applies pressure to different points around her ankle.  She states that she still has stiffness and pain localized to the area.  Patient also states that she fell approximately 3 weeks ago while wearing the boot.  States she was descending down the stairs and landed towards the right knee.  She expressed that she did not have any pain during the fall or afterwards.    05/27/2021:  Follow-up for chronic right ankle pain with a history of delayed union of the posterior malleolar nondisplaced fracture.  She also has an osteochondral defect to the medial talar dome for which she is receiving offloading with an orthopedic boot.  She relates that she does not have much pain when she is at rest or walking with the orthopedic boot.  She does describe aching sensation when she tries to walk around her home without the boot on.  She also describes intermittent burning pain as increasing pointing to the medial hindfoot/ankle area  traveling along the bottom of the foot.  States that her pain is manageable unless she touches an area around the ankle.    07/29/2021:  Presents ambulating with her orthopedic boot to the right lower extremity.  She has no pain with ambulation or activity while using the boot.  She previously completed her nerve conduction study which was normal however cannot tolerate the EMG.  Relates that she does not want to miss any time from work because she fears that her employment is in jeopardy.  She is also requesting to have some the restrictions lifted so that she can perform her duties while at work.  She works as a supervisor in a corrections facility.      08/25/2021:  Presents today for reassessment of the right ankle.  States that she has been ambulating with the ankle brace and a tennis shoe and experiences no significant pain.  She is able to work and perform her regular duties at work with the ankle brace and is requesting removal of her previous restrictions.  States that she will experience stiffness with the 1st few steps in the morning stating pain is rated as 4/10 that will improve significantly with moving around.      08/29/2022:  Follow up for chronic right ankle pain.  It has been over a year since her last examination.  She relates that she has a feeling of instability to the right ankle like her ankle is going to give out when she walks.  She is afraid to place too much weight on her foot.  She wears her brace sometimes however with the current shoe gear that she wears at work her brace is not fit in it.  She works at a [employer].  She also complains of weakness to the right lower extremity.  Pain aggravated by increased periods of standing and/or walking to the right ankle.    09/26/2022:  Follow-up for right ankle pain following MRI.  Patient relates that she is unable to wear her ankle brace for tennis shoes for long periods of time at work.  States that the pain will increase  with increased duration standing or walking.    11/28/2022:  Follow-up for chronic right ankle pain.  Patient is not wearing her ankle brace or compression stocking AMA.  She reports pain will wax and wane to the right ankle depending on her activity.  Notes that the pain and swelling to the right ankle resolve with rest.  She continues to work.  Wearing flip-flops today.  Relates that she worked last night.  She has not gone for fitting of right AFO as prescribed.    02/28/2023:  Follow-up for chronic right ankle pain.  She receives the [place] brace and has been wearing it at work.  Reports improvement in subjective pain while working which is when she has a significant flare-up in pain.  She is able to perform her duties and has no reduced ability to perform her activities of daily living.  She is ambulating with flip-flops today.    10/31/2023:  Follow-up for right foot injury which was initially treated at [facility] yesterday with left foot x-ray completed and read per radiology.  Imaging not directly available to us today.  Patient relates pain and swelling pointing to the lateral left midfoot region.  She was not dispensed any DME.  She is ambulating with Crocs.  Patient was also due for follow-up for chronic right ankle pain secondary to posttraumatic arthritis.  She wears an AFO while working which helps reduce her pain and reduce the incidence of flare-ups.    11/13/2023:  Follow-up for 5th metatarsal base fracture left foot x2 weeks.  Notes that she gets aching pain throughout this area at rest.  She is been performing minimal activity with the Cam boot and resting as advised.  She is no longer on vitamin-D when questioned.  She does have a history of vitamin-D deficiency.    12/19/2023:  Follow-up for 5th metatarsal base fracture x6 weeks.  Offloaded with Cam boot.  She has stopped working and is home with restrictions.    01/29/2024:  Follow-up for 5th metatarsal base avulsion fracture.   "Relates that she has residual intermittent pain to this area but it is improved overall.  She has been using the cam boot.  Has not returned to work.  Also inquiring about treatment for discolored and thickened toenails to both feet.  States that she stopped receiving pedicures.    Vitals:    24 0855   BP: (!) 145/83   Pulse: 82   Weight: 74.8 kg (165 lb)   Height: 4' 11" (1.499 m)   PainSc: 0-No pain      Past Medical History:   Diagnosis Date    Allergic rhinitis     Allergy     Asthma        Past Surgical History:   Procedure Laterality Date     SECTION      TUBAL LIGATION         Family History   Problem Relation Age of Onset    Breast cancer Mother     Cancer Father         throat    Diabetes Maternal Grandmother     Ovarian cancer Neg Hx     Stroke Neg Hx     Hypertension Neg Hx     Melanoma Neg Hx        Social History     Socioeconomic History    Marital status: Legally    Tobacco Use    Smoking status: Never    Smokeless tobacco: Never   Substance and Sexual Activity    Alcohol use: No    Drug use: No    Sexual activity: Yes     Partners: Male   Other Topics Concern    Are you pregnant or think you may be? No    Breast-feeding No       Current Outpatient Medications   Medication Sig Dispense Refill    albuterol (PROVENTIL) 2.5 mg /3 mL (0.083 %) nebulizer solution Take 3 mLs (2.5 mg total) by nebulization every 6 (six) hours as needed for Wheezing. 1 Box 0    albuterol (PROVENTIL/VENTOLIN HFA) 90 mcg/actuation inhaler Inhale 2 puffs into the lungs every 6 (six) hours as needed for Wheezing or Shortness of Breath. 18 g 2    calcium carbonate (OS-SHARMILA) 500 mg calcium (1,250 mg) tablet Take 1 tablet (500 mg total) by mouth once daily. 90 tablet 3    cholecalciferol, vitamin D3, 1,250 mcg (50,000 unit) capsule Take 1 capsule (50,000 Units total) by mouth every 7 days. 12 capsule 2    fluticasone propionate (FLONASE) 50 mcg/actuation nasal spray SHAKE LIQUID AND USE 1 SPRAY(50 MCG) IN EACH " NOSTRIL EVERY DAY 16 g 5    ibuprofen (ADVIL,MOTRIN) 800 MG tablet Take 1 tablet (800 mg total) by mouth every 6 (six) hours as needed for Pain. 60 tablet 1    [place] brace [place] [facility] [place] ankle 1 each 0    methocarbamoL (ROBAXIN) 500 MG Tab Take 1 tablet (500 mg total) by mouth 3 (three) times daily as needed (pain). 21 tablet 0    predniSONE (DELTASONE) 20 MG tablet Take 1 tablet (20 mg total) by mouth once daily. 4 tablet 0    triamcinolone acetonide 0.1% (KENALOG) 0.1 % ointment Apply topically 2 (two) times daily. 30 g 0    vitamin D3-vitamin K2 137.5-200 mcg Tab Take one by mouth daily 90 tablet 2    diclofenac sodium (VOLTAREN) 1 % Gel Apply 2 g topically 4 (four) times daily. for 10 days 100 g 5    olopatadine (PATANOL) 0.1 % ophthalmic solution Place 1 drop into both eyes 2 (two) times daily. (Patient not taking: Reported on 9/20/2021) 5 mL 2     No current facility-administered medications for this visit.       Review of patient's allergies indicates:   Allergen Reactions    Hydrocodone-acetaminophen Nausea Only and Nausea And Vomiting     Other reaction(s): Nausea         Review of Systems   Constitutional: Negative for chills, fever and malaise/fatigue.   HENT:  Negative for congestion and hearing loss.    Cardiovascular:  Negative for chest pain, claudication and leg swelling.   Respiratory:  Negative for cough and shortness of breath.    Skin:  Negative for color change and poor wound healing.   Musculoskeletal:  Positive for joint pain, joint swelling and stiffness. Negative for back pain, muscle cramps and muscle weakness.   Gastrointestinal:  Negative for nausea and vomiting.   Neurological:  Negative for numbness, paresthesias and weakness.   Psychiatric/Behavioral:  Negative for altered mental status.            Objective:      Physical Exam  Constitutional:       General: She is not in acute distress.  Cardiovascular:      Pulses:           Dorsalis pedis pulses are 2+ on the  left side.        Posterior tibial pulses are 2+ on the left side.   Musculoskeletal:      Comments: Limitation in active range of motion to the right ankle without pain however some mild audible crepitus heard.       Mild pes planus foot structure with normal range of motion to the subtalar joint midtarsal joints right foot.    Mild localized pain on palpation overlying the styloid process/base of the 5th metatarsal left foot and no pain with midtarsal joint range motion and slight to mild pain with attempted active resisted eversion of the left foot in the neutral position but not plantar flexed.     No pain with anterior drawer which is negative.  Mild pain with stress inversion of the left ankle directly overlying the styloid process region.  No pain on palpation along the course of the posterior tibial tendon left hindfoot/ankle.   Skin:     General: Skin is warm.      Capillary Refill: Capillary refill takes less than 2 seconds.      Findings: No ecchymosis or erythema.      Nails: There is no clubbing.      Comments: Nails 1-5 bilateral foot are partially covered with purple nail Polish however display brown discoloration involving the distal 2/3 with underlying debris and slight loosening.   Neurological:      Mental Status: She is alert and oriented to person, place, and time.      Sensory: Sensation is intact.      Motor: Weakness present.             Assessment:       Encounter Diagnoses   Name Primary?    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    Reviewed left foot x-ray noting nonunited 5th metatarsal base fracture near the metaphyseal diaphyseal junction with maintained alignment and some mild bony trabeculation noting that it is approximally 60-70% consolidated however patient still has some mild symptoms that requires more aggressive offloading to allow to heal.  Have also prescribed exogen bone stimulator to facilitate healing and hopefully allow her return to work sooner.    Continue vitamin-D supplementation.    Continue activity restrictions and modifications.  Letter dispensed on patient's behalf prevent her from returned to work for another month.    We discussed different treatment options for onychomycosis.  Patient agreed to receive a nail biopsy of the left 4th and 5th toe.  With a sterile nail Nipper the nails were trimmed at the distal 1/3 and the specimen sent for fungal culture.  We will discussed further treatment options once results available.  Briefly discussed oral systemic therapy versus topical therapy.    RTC within 1 month for p.r.n. as discussed.    A portion of this note was generated by voice recognition software and may contain spelling and grammar errors.

## 2024-01-30 ENCOUNTER — TELEPHONE (OUTPATIENT)
Dept: PODIATRY | Facility: CLINIC | Age: 59
End: 2024-01-30
Payer: COMMERCIAL

## 2024-01-30 NOTE — TELEPHONE ENCOUNTER
Sent secure email to Андрей White with [employer] an order for a bone stimulator for home use with supporting documentation. Confirmed by Андрей that information was received.

## 2024-02-06 ENCOUNTER — PATIENT MESSAGE (OUTPATIENT)
Dept: PODIATRY | Facility: CLINIC | Age: 59
End: 2024-02-06
Payer: COMMERCIAL

## 2024-02-09 ENCOUNTER — PATIENT MESSAGE (OUTPATIENT)
Dept: PODIATRY | Facility: CLINIC | Age: 59
End: 2024-02-09
Payer: COMMERCIAL

## 2024-02-12 ENCOUNTER — PATIENT MESSAGE (OUTPATIENT)
Dept: PODIATRY | Facility: CLINIC | Age: 59
End: 2024-02-12
Payer: COMMERCIAL

## 2024-02-29 ENCOUNTER — OFFICE VISIT (OUTPATIENT)
Dept: PODIATRY | Facility: CLINIC | Age: 59
End: 2024-02-29
Payer: COMMERCIAL

## 2024-02-29 ENCOUNTER — HOSPITAL ENCOUNTER (OUTPATIENT)
Dept: RADIOLOGY | Facility: HOSPITAL | Age: 59
Discharge: HOME | End: 2024-02-29
Attending: PODIATRIST
Payer: COMMERCIAL

## 2024-02-29 VITALS
BODY MASS INDEX: 33.26 KG/M2 | HEIGHT: 59 IN | DIASTOLIC BLOOD PRESSURE: 99 MMHG | WEIGHT: 165 LBS | SYSTOLIC BLOOD PRESSURE: 160 MMHG | HEART RATE: 79 BPM

## 2024-02-29 DIAGNOSIS — M62.81 MUSCLE WEAKNESS OF LOWER EXTREMITY: ICD-10-CM

## 2024-02-29 DIAGNOSIS — S92.302K: Primary | ICD-10-CM

## 2024-02-29 DIAGNOSIS — S92.302K: ICD-10-CM

## 2024-02-29 PROCEDURE — 3008F BODY MASS INDEX DOCD: CPT | Mod: CPTII,S$GLB,, | Performed by: PODIATRIST

## 2024-02-29 PROCEDURE — 1160F RVW MEDS BY RX/DR IN RCRD: CPT | Mod: CPTII,S$GLB,, | Performed by: PODIATRIST

## 2024-02-29 PROCEDURE — 1159F MED LIST DOCD IN RCRD: CPT | Mod: CPTII,S$GLB,, | Performed by: PODIATRIST

## 2024-02-29 PROCEDURE — 3077F SYST BP >= 140 MM HG: CPT | Mod: CPTII,S$GLB,, | Performed by: PODIATRIST

## 2024-02-29 PROCEDURE — 73630 X-RAY EXAM OF FOOT: CPT | Mod: 26,LT,, | Performed by: STUDENT IN AN ORGANIZED HEALTH CARE EDUCATION/TRAINING PROGRAM

## 2024-02-29 PROCEDURE — 3080F DIAST BP >= 90 MM HG: CPT | Mod: CPTII,S$GLB,, | Performed by: PODIATRIST

## 2024-02-29 PROCEDURE — 73630 X-RAY EXAM OF FOOT: CPT | Mod: TC,PN,LT

## 2024-02-29 PROCEDURE — 99999 PR PBB SHADOW E&M-EST. PATIENT-LVL IV: CPT | Mod: PBBFAC,,, | Performed by: PODIATRIST

## 2024-02-29 PROCEDURE — 99213 OFFICE O/P EST LOW 20 MIN: CPT | Mod: S$GLB,,, | Performed by: PODIATRIST

## 2024-02-29 RX ORDER — ASPIRIN 325 MG
50000 TABLET, DELAYED RELEASE (ENTERIC COATED) ORAL
Qty: 12 CAPSULE | Refills: 2 | Status: SHIPPED | OUTPATIENT
Start: 2024-02-29

## 2024-02-29 NOTE — PROGRESS NOTES
Subjective:      Patient ID: Rika June is a 58 y.o. female.    Chief Complaint: Foot Problem      Patient is a worker's compensation referral for chronic right ankle sprain which was initially treated at urgent care on 10/29/2020.  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  States that she has moderate amount of stiffness and swelling to the right ankle and the pain is aggravated by standing or walking.  She has not worn orthopedic boot or tendon physical therapy.  Recent MRI and CT confirmed nondisplaced posterior malleolus fracture of the right ankle.    04/22/2021:  Follow-up for nondisplaced posterior malleolus fracture of the right ankle x8 weeks.  She received a bone stimulator approximately a few days after her last visit has been using it daily as recommended.  She has been ambulating with the orthopedic boot and states that she has no pain while she is in a boot however she does have pain she takes the boot off and attempts to walk or she applies pressure to different points around her ankle.  She states that she still has stiffness and pain localized to the area.  Patient also states that she fell approximately 3 weeks ago while wearing the boot.  States she was descending down the stairs and landed towards the right knee.  She expressed that she did not have any pain during the fall or afterwards.    05/27/2021:  Follow-up for chronic right ankle pain with a history of delayed union of the posterior malleolar nondisplaced fracture.  She also has an osteochondral defect to the medial talar dome for which she is receiving offloading with an orthopedic boot.  She relates that she does not have much pain when she is at rest or walking with the orthopedic boot.  She does describe aching sensation when she tries to walk around her home without the boot on.  She also describes intermittent burning pain as increasing pointing to the medial hindfoot/ankle  area traveling along the bottom of the foot.  States that her pain is manageable unless she touches an area around the ankle.    07/29/2021:  Presents ambulating with her orthopedic boot to the right lower extremity.  She has no pain with ambulation or activity while using the boot.  She previously completed her nerve conduction study which was normal however cannot tolerate the EMG.  Relates that she does not want to miss any time from work because she fears that her employment is in jeopardy.  She is also requesting to have some the restrictions lifted so that she can perform her duties while at work.  She works as a supervisor in a corrections facility.      08/25/2021:  Presents today for reassessment of the right ankle.  States that she has been ambulating with the ankle brace and a tennis shoe and experiences no significant pain.  She is able to work and perform her regular duties at work with the ankle brace and is requesting removal of her previous restrictions.  States that she will experience stiffness with the 1st few steps in the morning stating pain is rated as 4/10 that will improve significantly with moving around.      08/29/2022:  Follow up for chronic right ankle pain.  It has been over a year since her last examination.  She relates that she has a feeling of instability to the right ankle like her ankle is going to give out when she walks.  She is afraid to place too much weight on her foot.  She wears her brace sometimes however with the current shoe gear that she wears at work her brace is not fit in it.  She works at a [employer].  She also complains of weakness to the right lower extremity.  Pain aggravated by increased periods of standing and/or walking to the right ankle.    09/26/2022:  Follow-up for right ankle pain following MRI.  Patient relates that she is unable to wear her ankle brace for tennis shoes for long periods of time at work.  States that the pain will  increase with increased duration standing or walking.    11/28/2022:  Follow-up for chronic right ankle pain.  Patient is not wearing her ankle brace or compression stocking AMA.  She reports pain will wax and wane to the right ankle depending on her activity.  Notes that the pain and swelling to the right ankle resolve with rest.  She continues to work.  Wearing flip-flops today.  Relates that she worked last night.  She has not gone for fitting of right AFO as prescribed.    02/28/2023:  Follow-up for chronic right ankle pain.  She receives the [place] brace and has been wearing it at work.  Reports improvement in subjective pain while working which is when she has a significant flare-up in pain.  She is able to perform her duties and has no reduced ability to perform her activities of daily living.  She is ambulating with flip-flops today.    10/31/2023:  Follow-up for right foot injury which was initially treated at [facility] yesterday with left foot x-ray completed and read per radiology.  Imaging not directly available to us today.  Patient relates pain and swelling pointing to the lateral left midfoot region.  She was not dispensed any DME.  She is ambulating with Crocs.  Patient was also due for follow-up for chronic right ankle pain secondary to posttraumatic arthritis.  She wears an AFO while working which helps reduce her pain and reduce the incidence of flare-ups.    11/13/2023:  Follow-up for 5th metatarsal base fracture left foot x2 weeks.  Notes that she gets aching pain throughout this area at rest.  She is been performing minimal activity with the Cam boot and resting as advised.  She is no longer on vitamin-D when questioned.  She does have a history of vitamin-D deficiency.    12/19/2023:  Follow-up for 5th metatarsal base fracture x6 weeks.  Offloaded with Cam boot.  She has stopped working and is home with restrictions.    01/29/2024:  Follow-up for 5th metatarsal base avulsion  "fracture.  Relates that she has residual intermittent pain to this area but it is improved overall.  She has been using the cam boot.  Has not returned to work.  Also inquiring about treatment for discolored and thickened toenails to both feet.  States that she stopped receiving pedicures.    2024: Follow-up for delayed union/nonunion 5th metatarsal June fracture/avulsion fracture.  Relates no significant pain but she does note when she walks without the boot 1st thing in the morning she has some stiffness.  She has been using the bone stimulator for 3 hours a day as prescribed.  She recalls hitting her foot a few days ago which caused him discomfort but that has improved.    Vitals:    24 0950   BP: (!) 160/99   Pulse: 79   Weight: 74.8 kg (165 lb)   Height: 4' 11" (1.499 m)   PainSc: 0-No pain      Past Medical History:   Diagnosis Date    Allergic rhinitis     Allergy     Asthma        Past Surgical History:   Procedure Laterality Date     SECTION      TUBAL LIGATION         Family History   Problem Relation Age of Onset    Breast cancer Mother     Cancer Father         throat    Diabetes Maternal Grandmother     Ovarian cancer Neg Hx     Stroke Neg Hx     Hypertension Neg Hx     Melanoma Neg Hx        Social History     Socioeconomic History    Marital status: Legally    Tobacco Use    Smoking status: Never    Smokeless tobacco: Never   Substance and Sexual Activity    Alcohol use: No    Drug use: No    Sexual activity: Yes     Partners: Male   Other Topics Concern    Are you pregnant or think you may be? No    Breast-feeding No       Current Outpatient Medications   Medication Sig Dispense Refill    albuterol (PROVENTIL) 2.5 mg /3 mL (0.083 %) nebulizer solution Take 3 mLs (2.5 mg total) by nebulization every 6 (six) hours as needed for Wheezing. 1 Box 0    albuterol (PROVENTIL/VENTOLIN HFA) 90 mcg/actuation inhaler Inhale 2 puffs into the lungs every 6 (six) hours as needed for " Wheezing or Shortness of Breath. 18 g 2    calcium carbonate (OS-SHARMILA) 500 mg calcium (1,250 mg) tablet Take 1 tablet (500 mg total) by mouth once daily. 90 tablet 3    fluticasone propionate (FLONASE) 50 mcg/actuation nasal spray SHAKE LIQUID AND USE 1 SPRAY(50 MCG) IN EACH NOSTRIL EVERY DAY 16 g 5    ibuprofen (ADVIL,MOTRIN) 800 MG tablet Take 1 tablet (800 mg total) by mouth every 6 (six) hours as needed for Pain. 60 tablet 1    leg brace (ANKLE BRACE) [facility] Dispense for right ankle 1 each 0    methocarbamoL (ROBAXIN) 500 MG Tab Take 1 tablet (500 mg total) by mouth 3 (three) times daily as needed (pain). 21 tablet 0    predniSONE (DELTASONE) 20 MG tablet Take 1 tablet (20 mg total) by mouth once daily. 4 tablet 0    triamcinolone acetonide 0.1% (KENALOG) 0.1 % ointment Apply topically 2 (two) times daily. 30 g 0    vitamin D3-vitamin K2 137.5-200 mcg Tab Take one by mouth daily 90 tablet 2    cholecalciferol, vitamin D3, 1,250 mcg (50,000 unit) capsule Take 1 capsule (50,000 Units total) by mouth every 7 days. 12 capsule 2    diclofenac sodium (VOLTAREN) 1 % Gel Apply 2 g topically 4 (four) times daily. for 10 days 100 g 5    olopatadine (PATANOL) 0.1 % ophthalmic solution Place 1 drop into both eyes 2 (two) times daily. (Patient not taking: Reported on 9/20/2021) 5 mL 2     No current facility-administered medications for this visit.       Review of patient's allergies indicates:   Allergen Reactions    Hydrocodone-acetaminophen Nausea Only and Nausea And Vomiting     Other reaction(s): Nausea         Review of Systems   Constitutional: Negative for chills, fever and malaise/fatigue.   HENT:  Negative for congestion and hearing loss.    Cardiovascular:  Negative for chest pain, claudication and leg swelling.   Respiratory:  Negative for cough and shortness of breath.    Skin:  Negative for color change and poor wound healing.   Musculoskeletal:  Positive for joint pain, joint swelling and stiffness. Negative for  back pain, muscle cramps and muscle weakness.   Gastrointestinal:  Negative for nausea and vomiting.   Neurological:  Negative for numbness, paresthesias and weakness.   Psychiatric/Behavioral:  Negative for altered mental status.            Objective:      Physical Exam  Constitutional:       General: She is not in acute distress.  Cardiovascular:      Pulses:           Dorsalis pedis pulses are 2+ on the left side.        Posterior tibial pulses are 2+ on the left side.   Musculoskeletal:      Comments: Limitation in active range of motion to the right ankle without pain however some mild audible crepitus heard.       Mild pes planus foot structure with normal range of motion to the subtalar joint midtarsal joints right foot.    Slight localized pain on palpation overlying the styloid process/base of the 5th metatarsal left foot and no pain with midtarsal joint range motion and slight pain with attempted active resisted eversion of the left foot in the neutral position but not plantar flexed.     No pain with anterior drawer which is negative.  No pain with stress inversion of the left ankle directly overlying the styloid process region.  No pain on palpation along the course of the posterior tibial tendon left hindfoot/ankle.   Skin:     General: Skin is warm.      Capillary Refill: Capillary refill takes less than 2 seconds.      Findings: No ecchymosis or erythema.      Nails: There is no clubbing.      Comments: Nails 1-5 bilateral foot are partially covered with purple nail Polish however display brown discoloration involving the distal 2/3 with underlying debris and slight loosening.   Neurological:      Mental Status: She is alert and oriented to person, place, and time.      Sensory: Sensation is intact.      Motor: Weakness present.             Assessment:       Encounter Diagnoses   Name Primary?    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      I counseled the patient on her conditions, their implications and medical management.    Reviewed left foot x-ray noting increased trabeculation and gradual disappearance of the previous fracture line near the 5th metatarsal diaphyseal junction that is angulated towards the styloid process consistent with an avulsion fracture.  No change in alignment.  Fracture site remains greater than 60% consolidated.    Patient is cleared to begin transitioning out of the orthopedic boot into an athletic style shoe or shoe significant support with 30 minute daily increments.  I recommend avoiding high impact activity for the next 3-4 weeks and then she can increase without restrictions.  She is cleared return to work within 4 weeks without restrictions.  Continue use of the bone stimulator as prescribed.    Continue vitamin-D supplementation.    RTC within 2 months for follow-up weight-bearing left foot x-ray or p.r.n. as discussed.    A portion of this note was generated by voice recognition software and may contain spelling and grammar errors.

## 2024-03-05 ENCOUNTER — TELEPHONE (OUTPATIENT)
Dept: PODIATRY | Facility: CLINIC | Age: 59
End: 2024-03-05
Payer: COMMERCIAL

## 2024-03-05 LAB — FUNGUS BLD CULT: NORMAL

## 2024-03-05 NOTE — TELEPHONE ENCOUNTER
Called and spoke with Ms June to notify her that her fungus culture did not grow anything per Dr Macario, and that he recommends  a topical broad spectrum antifungal nail solution from [facility] that can be applied twice a day for 6-12 months or as needed. Also let her know that per Dr Macario, if she wants to receive systemic therapy(medicine by mouth) which is more effective then we can set up a virtual visit to further discuss. Per Ms June will try solution from [facility] first. No other needs voiced at this time. Encouraged call back if needed.

## 2024-03-05 NOTE — TELEPHONE ENCOUNTER
Please notify patient that her fungus culture did not grow anything. I recommend we try a topical broad spectrum antifungal nail solution from [facility] that can be applied twice a day for 6-12 months or as needed. If she wants to receive systemic therapy(medicine by mouth) which is more effective then we can set up a virtual visit to further discuss.

## 2024-03-06 ENCOUNTER — CLINICAL SUPPORT (OUTPATIENT)
Dept: REHABILITATION | Facility: HOSPITAL | Age: 59
End: 2024-03-06
Payer: COMMERCIAL

## 2024-03-06 DIAGNOSIS — R29.898 DECREASED STRENGTH OF LOWER EXTREMITY: ICD-10-CM

## 2024-03-06 DIAGNOSIS — R26.9 GAIT ABNORMALITY: Primary | ICD-10-CM

## 2024-03-06 DIAGNOSIS — S92.302K: ICD-10-CM

## 2024-03-06 DIAGNOSIS — M25.672 DECREASED RANGE OF MOTION OF LEFT ANKLE: ICD-10-CM

## 2024-03-06 DIAGNOSIS — M62.81 MUSCLE WEAKNESS OF LOWER EXTREMITY: ICD-10-CM

## 2024-03-06 PROCEDURE — 97140 MANUAL THERAPY 1/> REGIONS: CPT

## 2024-03-06 PROCEDURE — 97161 PT EVAL LOW COMPLEX 20 MIN: CPT

## 2024-03-06 PROCEDURE — 97112 NEUROMUSCULAR REEDUCATION: CPT

## 2024-03-08 ENCOUNTER — TELEPHONE (OUTPATIENT)
Dept: PODIATRY | Facility: CLINIC | Age: 59
End: 2024-03-08
Payer: COMMERCIAL

## 2024-03-08 RX ORDER — IBUPROFEN 800 MG/1
800 TABLET ORAL EVERY 6 HOURS PRN
Qty: 30 TABLET | Refills: 1 | Status: SHIPPED | OUTPATIENT
Start: 2024-03-08 | End: 2024-04-29 | Stop reason: SDUPTHER

## 2024-03-08 NOTE — TELEPHONE ENCOUNTER
----- Message from Kimberly Chan MA sent at 3/8/2024  9:53 AM CST -----  Regarding: FW: medication  Contact: @ [phone]  Hi Dr. Macario,       Patient would like some more pain medication, see message below.    Kimberly  ----- Message -----  From: June Cobian  Sent: 3/8/2024   8:52 AM CST  To: Amir[facility] Lorenzo Staff  Subject: medication                                       Pt called in regards to seeing if she can get some pain medication called in for her foot she is in pain  .....Please call and adv @ [phone]      [id number] - [place] - [place] [address] AT [address] [address]  [address]  [place] [address]  Phone: [phone] Fax: [phone]

## 2024-03-11 ENCOUNTER — CLINICAL SUPPORT (OUTPATIENT)
Dept: REHABILITATION | Facility: HOSPITAL | Age: 59
End: 2024-03-11
Payer: COMMERCIAL

## 2024-03-11 ENCOUNTER — PATIENT MESSAGE (OUTPATIENT)
Dept: PODIATRY | Facility: CLINIC | Age: 59
End: 2024-03-11
Payer: COMMERCIAL

## 2024-03-11 DIAGNOSIS — R26.9 GAIT ABNORMALITY: Primary | ICD-10-CM

## 2024-03-11 DIAGNOSIS — M25.672 DECREASED RANGE OF MOTION OF LEFT ANKLE: ICD-10-CM

## 2024-03-11 DIAGNOSIS — R29.898 DECREASED STRENGTH OF LOWER EXTREMITY: ICD-10-CM

## 2024-03-11 PROCEDURE — 97140 MANUAL THERAPY 1/> REGIONS: CPT

## 2024-03-11 PROCEDURE — 97110 THERAPEUTIC EXERCISES: CPT

## 2024-03-11 PROCEDURE — 97112 NEUROMUSCULAR REEDUCATION: CPT

## 2024-03-13 ENCOUNTER — CLINICAL SUPPORT (OUTPATIENT)
Dept: REHABILITATION | Facility: HOSPITAL | Age: 59
End: 2024-03-13
Payer: COMMERCIAL

## 2024-03-13 DIAGNOSIS — R26.9 GAIT ABNORMALITY: Primary | ICD-10-CM

## 2024-03-13 DIAGNOSIS — R29.898 DECREASED STRENGTH OF LOWER EXTREMITY: ICD-10-CM

## 2024-03-13 DIAGNOSIS — M25.672 DECREASED RANGE OF MOTION OF LEFT ANKLE: ICD-10-CM

## 2024-03-13 PROCEDURE — 97112 NEUROMUSCULAR REEDUCATION: CPT

## 2024-03-13 PROCEDURE — 97140 MANUAL THERAPY 1/> REGIONS: CPT

## 2024-03-13 PROCEDURE — 97110 THERAPEUTIC EXERCISES: CPT

## 2024-03-14 ENCOUNTER — TELEPHONE (OUTPATIENT)
Dept: PODIATRY | Facility: CLINIC | Age: 59
End: 2024-03-14
Payer: COMMERCIAL

## 2024-03-14 NOTE — TELEPHONE ENCOUNTER
Contacted pt to inform her forms were ready. Pt stated she would like to  forms tomorrow. Message sent to Dr. Macario's office.

## 2024-03-14 NOTE — TELEPHONE ENCOUNTER
Called patient to see if she would like to come by and  her [facility] paperwork.  The patient stated that she will be here to  her paperwork tomorrow before 5.  Patient has no more questions at this time.     Kimberly

## 2024-03-18 ENCOUNTER — CLINICAL SUPPORT (OUTPATIENT)
Dept: REHABILITATION | Facility: HOSPITAL | Age: 59
End: 2024-03-18
Payer: COMMERCIAL

## 2024-03-18 DIAGNOSIS — R29.898 DECREASED STRENGTH OF LOWER EXTREMITY: ICD-10-CM

## 2024-03-18 DIAGNOSIS — R26.9 GAIT ABNORMALITY: Primary | ICD-10-CM

## 2024-03-18 DIAGNOSIS — M25.672 DECREASED RANGE OF MOTION OF LEFT ANKLE: ICD-10-CM

## 2024-03-18 PROCEDURE — 97110 THERAPEUTIC EXERCISES: CPT

## 2024-03-18 PROCEDURE — 97112 NEUROMUSCULAR REEDUCATION: CPT

## 2024-03-18 PROCEDURE — 97140 MANUAL THERAPY 1/> REGIONS: CPT

## 2024-03-18 NOTE — PROGRESS NOTES
"[facility]   Physical Therapy Treatment Note     Name: Rika June  Clinic Number: [id number]    Therapy Diagnosis:   Encounter Diagnoses   Name Primary?    Gait abnormality Yes    Decreased range of motion of left ankle     Decreased strength of lower extremity      Physician: Bj Macario DPM    Visit Date: 3/11/2024  Physician Orders: PT Eval and Treat   Medical Diagnosis from Referral: 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 [S92.302K], Muscle weakness of lower extremity [M62.81]   Evaluation Date: 3/6/2024  Authorization Period Expiration: 12/31/2024  Plan of Care Expiration: 6/10/2024  Progress Note Due: 4/10/2024  Visit # / Visits authorized: 1/20   FOTO: 1/3    PTA Visit #: 0/5     FOTO first follow up:   FOTO second follow up:     Precautions: Standard and Weightbearing     Time In: 11:02 am   Time Out: 11:56  Total Billable Time: 54 minutes    SUBJECTIVE     Pt reports: She is doing ok, still has pain with walking and has been wearing the boot for majority of time with only 20 min a day out of boot and to complete some exercises when walking.   She was compliant with home exercise program.  Response to previous treatment: Independent in HEP   Functional change: Ongoing     Pain: 2/10  Location: left foot      OBJECTIVE     Objective Measures updated at progress report unless specified.     Treatment     Rika received the treatments listed below:      therapeutic exercises to develop strength, endurance, ROM, flexibility, posture, and core stabilization for 18 minutes including:    Ankle assessment  Standing DF mobilizations with BlueTB on 4-inch 1 x 15 x 3" holds   Step downs with BlueTB for TC distraction 2 x 10 reps 4-inch with UE assist for balance   LAQ 4# 3 x 8 x 3" holds   Sidelying clamshells with RedTB 2 x 12 reps each     manual therapy techniques: Joint mobilizations and Soft tissue Mobilization were applied to the: Hip, ankle " "for 13 minutes, including:    (L) Talocrural AP joint mobilizations grade II-III  (L) Subtalar joint mobilizations grade II-III  (L) Great toe joint mobility     neuromuscular re-education activities to improve: Balance, Coordination, Kinesthetic, Sense, Proprioception, and Posture for 23 minutes. The following activities were included:    Seated short arches 2 x 10 x 5" holds   Seated towel crunches x 3'   Manual resistance FHL strengthening 1 x 10 reps   DL shuttle with short arch 3 x 10 reps 50#   SL shuttle with short arch 3 x 8 reps 12.5#  PPT 2 x 10 x 5" holds     therapeutic activities to improve functional performance for 00 minutes, including:      gait training to improve functional mobility and safety for 00 minutes, including:      Patient Education and Home Exercises     Home Exercises Provided and Patient Education Provided     Education provided:   - Continue with HEP  - Weaning out of her boot, progressively loading without overloading and monitoring edema/pain   - Gait mechanics, importance of proximal strengthening     Written Home Exercises Provided: yes. Exercises were reviewed and Rika was able to demonstrate them prior to the end of the session.  Rika demonstrated good  understanding of the education provided. See EMR under Patient Instructions for exercises provided during therapy sessions    ASSESSMENT     Rika tolerated first physical therapy session since initial evaluation fairly well. She continues with limitations in ankle mobility and manual therapy utilized to improve. She demonstrated improvement in hypersensitivity compared to initial evaluation. She was further challenged with foot intrinsic strengthening and further proximal strengthening. She fatigues quickly with most therex and requires rest breaks throughout. Would benefit from further cardiovascular endurance training as well in future visits to help with eventual return to work. Continued progression with " weightbearing and importance of progressive/gradual increased time out of boot to help with progress. She was adequately challenged and fatigued end of session.     Rika Is progressing well towards her goals.   Pt prognosis is Good.     Pt will continue to benefit from skilled outpatient physical therapy to address the deficits listed in the problem list box on initial evaluation, provide pt/family education and to maximize pt's level of independence in the home and community environment.     Pt's spiritual, cultural and educational needs considered and pt agreeable to plan of care and goals.     Anticipated barriers to physical therapy: Scheduling, chronicity of symptoms     Goals:   Short Term Goals: 4-5 weeks (Progressing, not met)  Patient will be independent in initial HEP to help supplement PT.  Patient will improve left ankle dorsiflexion range of motion by >/= 5 degrees to help with gait mechanics.   Patient will improve pain at its worst to </= 3/10 to help improve quality of life.   Patient will improve single leg balance to >/= 10 sec to help decrease fall risk.      Long Term Goals: 10-12 weeks (Progressing, not met)  Patient will be independent in updated HEP to help supplement PT and maintain gains made in PT.  Patient will improve FOTO score to >/= predicted (71) to show improvement in condition.   Patient goal: Patient will be able to walk independently with no pain to help with returning to work.   Patient will improve hip abduction strength to >/= 3+/5 to help with stair negotiation.   Patient will demonstrate a TUG of </= 13 sec to demonstrate decreased fall risk.     PLAN   Plan of care Certification: 3/6/2024 to 6/10/2024.     Continue with current plan of care with emphasis on ankle mobility, foot intrinsics strengthening, proximal lower extremity strengthening, and gait mechanics.     Nhi Springer, PT, DPT, OCS

## 2024-03-18 NOTE — PROGRESS NOTES
"[facility]   Physical Therapy Treatment Note     Name: Rika June  Clinic Number: [id number]    Therapy Diagnosis:   Encounter Diagnoses   Name Primary?    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   She was compliant with home exercise program.  Response to previous treatment: Independent in HEP   Functional change: Ongoing     Pain: 2/10  Location: left foot      OBJECTIVE     Objective Measures updated at progress report unless specified.     Treatment     Rika received the treatments listed below:      therapeutic exercises to develop strength, endurance, ROM, flexibility, posture, and core stabilization for 12 minutes including:    Ankle assessment  Standing DF mobilizations with BlueTB on 4-inch 1 x 15 x 3" holds   Step downs with BlueTB for TC distraction 2 x 10 reps 4-inch with UE assist for balance   NuStep x 5' for range of motion, strength, and cardiovascular endurance     Not Today:   LAQ 4# 3 x 8 x 3" holds   Sidelying clamshells with RedTB 2 x 12 reps each     manual therapy techniques: Joint mobilizations and Soft tissue Mobilization were applied to the: Hip, ankle for 12 minutes, including:    (L) Talocrural AP " "joint mobilizations grade II-III  (L) Subtalar joint mobilizations grade II-III  (L) Great toe joint mobility     neuromuscular re-education activities to improve: Balance, Coordination, Kinesthetic, Sense, Proprioception, and Posture for 25 minutes. The following activities were included:    Seated short arches 2 x 10 x 5" holds   Seated towel crunches x 3'  DL shuttle with short arch 3 x 10 reps 50#   SL shuttle with short arch 3 x 8 reps 12.5#  DL shuttle heel raises 25# 2 x 10 reps with tennis ball 3" holds   Seated heel raises 2 x 10 x 3" holds with tennis ball between heels     Not Today:   Manual resistance FHL strengthening 1 x 10 reps   PPT 2 x 10 x 5" holds     therapeutic activities to improve functional performance for 00 minutes, including:      gait training to improve functional mobility and safety for 00 minutes, including:      Patient Education and Home Exercises     Home Exercises Provided and Patient Education Provided     Education provided:   - Continue with HEP  - Weaning out of her boot, progressively loading without overloading and monitoring edema/pain   - Gait mechanics, importance of proximal strengthening     Written Home Exercises Provided: yes. Exercises were reviewed and Rika was able to demonstrate them prior to the end of the session.  Rika demonstrated good  understanding of the education provided. See EMR under Patient Instructions for exercises provided during therapy sessions    ASSESSMENT     Rika presented to today's therapy session fatigued and limited tolerance to therex with increased fatigue. Continued manual therapy to improve ankle mobility with good tolerance. Continued foot intrinsic strengthening with continued tactile cueing for proper form. Progressed with gastroc-soleus strengthening with tennis ball to ensure calcaneal inversion and proper form. She fatigued quickly with therex but able to perform all sets and reps. Continued education on progressive " loading and importance of strengthening and moving to help with eventual return to work. Will limited tolerance to full weight bearing and fatiguing quickly increased plan of care expected and patient educated on importance of moving to help with returning to work understanding that her foot has not had the amount of load she will experience at work in man months with her being in a boot to which she verbalized understanding. Will continue to monitor and progress as able.     Rika Is progressing well towards her goals.   Pt prognosis is Good.     Pt will continue to benefit from skilled outpatient physical therapy to address the deficits listed in the problem list box on initial evaluation, provide pt/family education and to maximize pt's level of independence in the home and community environment.     Pt's spiritual, cultural and educational needs considered and pt agreeable to plan of care and goals.     Anticipated barriers to physical therapy: Scheduling, chronicity of symptoms     Goals:   Short Term Goals: 4-5 weeks (Progressing, not met)  Patient will be independent in initial HEP to help supplement PT. - MET  Patient will improve left ankle dorsiflexion range of motion by >/= 5 degrees to help with gait mechanics.   Patient will improve pain at its worst to </= 3/10 to help improve quality of life.   Patient will improve single leg balance to >/= 10 sec to help decrease fall risk.      Long Term Goals: 10-12 weeks (Progressing, not met)  Patient will be independent in updated HEP to help supplement PT and maintain gains made in PT.  Patient will improve FOTO score to >/= predicted (71) to show improvement in condition.   Patient goal: Patient will be able to walk independently with no pain to help with returning to work.   Patient will improve hip abduction strength to >/= 3+/5 to help with stair negotiation.   Patient will demonstrate a TUG of </= 13 sec to demonstrate decreased fall risk.     PLAN    Plan of care Certification: 3/6/2024 to 6/10/2024.     Continue with current plan of care with emphasis on ankle mobility, foot intrinsics strengthening, proximal lower extremity strengthening, and gait mechanics.     Nhi Springer, PT, DPT, OCS

## 2024-03-18 NOTE — PROGRESS NOTES
[facility]   Physical Therapy Treatment Note     Name: Rika June  Clinic Number: [id number]    Therapy Diagnosis:   Encounter Diagnoses   Name Primary?    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 She is tired today and didn't do much over the weekend as he back was bothering her so she spent most of the weekend with the heating pad on her back.   She was compliant with home exercise program.  Response to previous treatment: Independent in HEP   Functional change: Ongoing     Pain: 2/10  Location: left foot      OBJECTIVE     Objective Measures updated at progress report unless specified.     Observation 3/18/2024:    Gait: Patient ambulates into clinic independently and displays antalgic gait, decreased step length on right, limited push-off on left, decreased dorsiflexion and increased hip ER during swing and initial contact on left.     Ankle Range of Motion:   Right AROM Left AROM/PROM   Dorsiflexion 4 degrees  -1 degrees (lack) / 2 degrees    Plantarflexion 55 degrees  40 degrees / 48 degrees    Inversion 30 degrees  20 degrees / 22 degrees   Eversion  18 degrees  10 degrees / 12 degrees        Treatment     Rika  "received the treatments listed below:      therapeutic exercises to develop strength, endurance, ROM, flexibility, posture, and core stabilization for 18 minutes including:    Gastroc/soleus stretch 4 x 20" holds   Standing DF mobilizations with BlackTB on 4-inch 1 x 15 x 3" holds   LAQ 4# 3 x 10 x 3" holds   Seated hip ER with RedTB 3 x 8 reps   Ankle inversion with YellowTB 2 x 10 reps     Not Today:   Step downs with BlueTB for TC distraction 2 x 10 reps 4-inch with UE assist for balance   NuStep x 5' for range of motion, strength, and cardiovascular endurance   Sidelying clamshells with RedTB 2 x 12 reps each     manual therapy techniques: Joint mobilizations and Soft tissue Mobilization were applied to the: Hip, ankle for 12 minutes, including:    (L) Talocrural AP joint mobilizations grade II-III  (L) Subtalar joint mobilizations grade II-III  (L) Great toe joint mobility     neuromuscular re-education activities to improve: Balance, Coordination, Kinesthetic, Sense, Proprioception, and Posture for 31 minutes. The following activities were included:    Assessment as above   Standing short arches 2 x 10 x 5" holds   Standing short arch with mini squat 2 x 10 reps   FHL with YellowTB 3 x 6-8 reps with emphasis on eccentric control   DL shuttle with short arch 3 x 10 reps 62.5#   SL shuttle with short arch 3 x 8 reps 25#   DL shuttle heel raises 3 x 10 reps 37.5#  PF with GreenTB with emphasis on eccentric control 3 x 8 reps     Not Today:  Seated short arches 2 x 10 x 5" holds   Seated towel crunches x 3'  DL shuttle heel raises 25# 2 x 10 reps with tennis ball 3" holds   Seated heel raises 2 x 10 x 3" holds with tennis ball between heels   Manual resistance FHL strengthening 1 x 10 reps   PPT 2 x 10 x 5" holds     therapeutic activities to improve functional performance for 00 minutes, including:    Not Today:  Recumbent bike   Step ups   Step downs   Sit to stands with hip/hinge pattern     gait training to " improve functional mobility and safety for 00 minutes, including:      Patient Education and Home Exercises     Home Exercises Provided and Patient Education Provided     Education provided:   - Continue with HEP  - Weaning out of her boot, progressively loading without overloading and monitoring edema/pain   - Gait mechanics, importance of proximal strengthening     Written Home Exercises Provided: yes. Exercises were reviewed and Rika was able to demonstrate them prior to the end of the session.  Rika demonstrated good  understanding of the education provided. See EMR under Patient Instructions for exercises provided during therapy sessions    ASSESSMENT     Rika presented to today's session with continued antalgic gait and stiffness in her ankle. Continued manual therapy and therex to improve her foot mobility with good tolerance. She continues to be challenged with foot intrinsic strengthening and able to progress to standing today. Continued tactile cueing for form with improved carryover. Able to progress with shuttle intensity and proximal strengthening. Noticeable muscle fasciculations with LAQ exercise in her quad and difficulty lifting her left leg following secondary to fatigue. She was re-assessed for ankle mobility and demonstrated improvement compared to initial evaluation. She was adequately challenged and fatigued end of session.     Rika Is progressing well towards her goals.   Pt prognosis is Good.     Pt will continue to benefit from skilled outpatient physical therapy to address the deficits listed in the problem list box on initial evaluation, provide pt/family education and to maximize pt's level of independence in the home and community environment.     Pt's spiritual, cultural and educational needs considered and pt agreeable to plan of care and goals.     Anticipated barriers to physical therapy: Scheduling, chronicity of symptoms     Goals:   Short Term Goals: 4-5 weeks  (Progressing, not met)  Patient will be independent in initial HEP to help supplement PT. - MET  Patient will improve left ankle dorsiflexion range of motion by >/= 5 degrees to help with gait mechanics.   Patient will improve pain at its worst to </= 3/10 to help improve quality of life.   Patient will improve single leg balance to >/= 10 sec to help decrease fall risk.      Long Term Goals: 10-12 weeks (Progressing, not met)  Patient will be independent in updated HEP to help supplement PT and maintain gains made in PT.  Patient will improve FOTO score to >/= predicted (71) to show improvement in condition.   Patient goal: Patient will be able to walk independently with no pain to help with returning to work.   Patient will improve hip abduction strength to >/= 3+/5 to help with stair negotiation.   Patient will demonstrate a TUG of </= 13 sec to demonstrate decreased fall risk.     PLAN   Plan of care Certification: 3/6/2024 to 6/10/2024.     Continue with current plan of care with emphasis on ankle mobility, foot intrinsics strengthening, proximal lower extremity strengthening, and gait mechanics.     Nhi Springer, PT, DPT, OCS

## 2024-03-18 NOTE — PLAN OF CARE
[facility]   Physical Therapy Initial Evaluation      Name: Rika June  Clinic Number: [id number]    Therapy Diagnosis:   Encounter Diagnoses   Name Primary?    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 [S92.302K], Muscle weakness of lower extremity [M62.81]   Evaluation Date: 3/6/2024  Authorization Period Expiration: 2/28/2024  Plan of Care Expiration: 6/10/2024  Progress Note Due: 4/10/2024  Visit # / Visits authorized: 1/1   FOTO: 1/3    Precautions: Standard and Weightbearing     Time In: 2:00 pm  Time Out: 2:50 pm   Total Appointment Time (timed & untimed codes): 50 minutes    Subjective     Date of onset: October of 2023    History of current condition - Rika reports: Around October of last year she was moving things in and out of a trailer and thinks she mis stepped hurting her left ankle/foot. By time she got from [facility] to home in [place] her foot was swollen and she couldn't walk. She saw the doctor and was told it was fractured. She was placed in a boot and has been in a boot since October of 2023. She was recently released from the boot last Thursday to start weaning out. She has been out of the boot for 20 min a day since Thursday as that is what she thought the doctor told her. She reports the outside of her foot gives her some dull and achy pain with walking and weightbearing. She has no pain in her foot when she is in the boot so she tends to wear the boot more. She is scheduled to be released to return to work in April (4/1/2024) for full duty. She needs to be able to walk a lot and get up and  down with no issues. She reports she has started having some low back pain mainly since she has been put in the boot. She denies any other pertinent past medical history other than a sprain on her right ankle a while back    Imaging:   X-Ray Left Foot (2024):  FINDINGS:  No interval detrimental change.  Similar appearance of previously described 5th metatarsal base fracture.  No new fractures identified.  Joint spaces are maintained.  Lisfranc articulation is congruent.  Calcaneal spurring.  Pes planus.  Mild hallux valgus.  Soft tissues are unremarkable.    X-Ray Left Foot (10/31/2023):  FINDINGS:  There is minimally displaced transversely oriented fracture through the 5th metatarsal base.  Lisfranc interval is congruent.  Mild calcaneal enthesophyte at the plantar fascial attachment.    Prior Therapy: No  Social History: Lives alone  Occupation:  (12 hour shifts)  Prior Level of Function: Prior to injury in October, independent in all ADLs and job duties with no pain.   Current Level of Function: Ambulating in boot 90% of time and without with pain in her foot, unable to work, and difficulty with ADLs and ambulation.     Pain:  Current 0/10, worst 6/10, best 0/10   Location: left foot     Description: Aching and Dull  Aggravating Factors: Standing, Walking, and Morning walking without the boot  Easing Factors: rest and elevation      Patients goals: To improve her pain and swelling so she can return to work without issues.     Medical History:   Past Medical History:   Diagnosis Date    Allergic rhinitis     Allergy     Asthma        Surgical History:   Rika June  has a past surgical history that includes  section and Tubal ligation.    Medications:   Rika has a current medication list which includes the following prescription(s): albuterol, albuterol, calcium carbonate, cholecalciferol (vitamin d3), diclofenac sodium, fluticasone propionate, ibuprofen, ankle  brace, methocarbamol, olopatadine, prednisone, triamcinolone acetonide 0.1%, and vitamin d3-vitamin k2.    Allergies:   Review of patient's allergies indicates:   Allergen Reactions    Hydrocodone-acetaminophen Nausea Only and Nausea And Vomiting     Other reaction(s): Nausea        Objective      Observation: Patient presents into clinic with an overall pleasant general affect who does not appear to be in any acute distress.     Posture: Patient displays static stance posture of noticeable weight shift to right lower extremity to offload left, slightly flexed left knee posture, increased lumbar lordosis.     Gait: Patient ambulates independently with no assistance and displays antalgic gait, decreased step length on right to offload left, decreased push-off on left and limited dorsiflexion on left, increased right hip external rotation throughout with increased during swing.     Sensation: Intact to light touch bilateral lower extremities in dermatomal pattern L2-S2.     Range of Motion:  Lumbar    Percentage  Pain    Flexion 60% Pain   Extension 70% Pain   Right Sidebend  50% Pain   Left Sidebend  50% Pain   Right Rotation 70% Pain   Left Rotation  70% Pain    *Patient reported low back pain with all lumbar assessment with worst pain during lumbar flexion range of motion.     Hip   Right Left    Flexion 90 degrees  90 degrees    Extension 0 degrees  0 degrees    Internal Rotation 30 degrees  15 degrees*    External Rotation 45 degrees  40 degrees    *Patient reported pain in posterior hip ~PSIS on left hip assessment with internal rotation     Knee   Right Left    Flexion 120 degrees  130 degrees    Extension +2 degrees (hyper) +2 degrees (hyper)     Ankle   Right AROM/PROM Left AROM/PROM   Dorsiflexion 0 degrees / 5 degrees  -3 degrees (lack) / 0 degrees     Plantarflexion 50 degrees / 50 degrees  30 degrees / 40 degrees    Inversion 30 degrees / 30 degrees  15 degrees* / 20 degrees*   Eversion 18 degrees / 22  degrees  8 degrees / 12 degrees    *Patient reported pain with ankle inversion assessment on left lower extremity.     Lower Extremity Strength (MMT):   Right Left    Hip Flexion 4-/5 3+/5   Hip Abduction 3+/5 3/5   Hip Extension 3/5 3-/5   Knee Flexion 4+/5 4-/5   Knee Extension 4+/5 4-/5   Dorsiflexion 4/5 3/5   Plantarflexion 4/5 3/5   Inversion 4/5 3/5   Eversion 4/5 3/5   FHL 3/5 3-/5     Joint Mobility: Limited talocrural joint mobility on left, decreased subtalar joint mobility, limited great toe joint mobility on left.     Palpation: Increased hypersensitivity noted with palpation of left lower extremity with improvement with increased touch.     Special Tests:  - MAYITO: - on R, + on L for hip pathology and slight SI   - FADIR: - on R, + for discomfort in hip and reproduction PSIS pain   - SLR: - bilaterally   - Further SI assessment at follow-ups     Functional Tests:   - Double Leg Squat: Able to perform, unable to get parallel, significant weight shift to right to offload left and quad dominant.   - Single Leg Squat: NT  - Single Leg Balance: Right = 8 sec, Left = 1 sec, increased pain/discomfort   - Step Down Test: NT    Intake Outcome Measure for FOTO Foot Survey    Therapist reviewed FOTO scores for Rika June on 3/6/2024.   FOTO documents entered into [email or web address] - see Media section.    Intake Score: 63%       Treatment     Total Treatment time (time-based codes) separate from Evaluation: 23 minutes     Rika received the treatments listed below:      therapeutic exercises to develop strength, endurance, ROM, flexibility, posture, and core stabilization for 00 minutes including:      manual therapy techniques: Joint mobilizations and Soft tissue Mobilization were applied to the: Hip, ankle for 09 minutes, including:    Lateral hip distraction with belt   Gentle open packed talocrural AP joint mobilizations   - fair tolerance, increased sensitivity   Great toe mobilizations     neuromuscular  "re-education activities to improve: Balance, Coordination, Kinesthetic, Sense, Proprioception, and Posture for 14 minutes. The following activities were included:    Pt education on PT POC, Prognosis, and HEP   Pt education on weaning out of boot, proper progressive loading, and sensitivity / desensitization   1/2 kneeling DF mobilizations 1 x 10 x 3" holds   Weight shifts fwd/bwkd 1 x 10 x 3"   Seated short arches 1 x 10 x 5" holds   Posterior Pelvic Tilts 1 x 10 x 5" holds   Gait mechanics, heel to toe gait emphasis     therapeutic activities to improve functional performance for 00 minutes, including:      gait training to improve functional mobility and safety for 00 minutes, including:      Patient Education and Home Exercises     Education provided:   - PT POC, Prognosis, and HEP   - Weaning out of her boot, progressively loading without overloading and monitoring edema/pain   - Gait mechanics, importance of proximal strengthening     Written Home Exercises Provided: yes. Exercises were reviewed and Rika was able to demonstrate them prior to the end of the session.  Rika demonstrated good  understanding of the education provided. See EMR under Patient Instructions for exercises provided during therapy sessions.    Assessment     Rika is a 58 y.o. 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 Patient presents with gait abnormality, decreased ankle mobility and hip mobility, decreased lower extremity strength, limited joint mobility, pain, and functional limitations with ADLs and unable to work. Patient would benefit from skilled PT intervention to address above stated deficits and improve overall functional mobility.     Patient prognosis is Good.   Patient will benefit from skilled outpatient Physical Therapy to address the deficits stated above " and in the chart below, provide patient /family education, and to maximize patientt's level of independence.     Plan of care discussed with patient: Yes  Patient's spiritual, cultural and educational needs considered and patient is agreeable to the plan of care and goals as stated below:     Anticipated Barriers for therapy: Scheduling, hypersensitivity, chronicity of symptoms     Medical Necessity is demonstrated by the following  History  Co-morbidities and personal factors that may impact the plan of care [] LOW: no personal factors / co-morbidities  [x] MODERATE: 1-2 personal factors / co-morbidities  [] HIGH: 3+ personal factors / co-morbidities    Moderate / High Support Documentation: asthma, increased BMI, age      Examination  Body Structures and Functions, activity limitations and participation restrictions that may impact the plan of care [] LOW: addressing 1-2 elements  [] MODERATE: 3+ elements  [x] HIGH: 3+ elements (please support below)    Moderate / High Support Documentation: range of motion, strength, gait, pain, joint mobility, limited balance/proprioception, motor control, neuromuscular re-education      Clinical Presentation [x] LOW: stable  [] MODERATE: Evolving  [] HIGH: Unstable     Decision Making/ Complexity Score: low       Goals:  Short Term Goals: 4-5 weeks   Patient will be independent in initial HEP to help supplement PT.  Patient will improve left ankle dorsiflexion range of motion by >/= 5 degrees to help with gait mechanics.   Patient will improve pain at its worst to </= 3/10 to help improve quality of life.   Patient will improve single leg balance to >/= 10 sec to help decrease fall risk.     Long Term Goals: 10-12 weeks   Patient will be independent in updated HEP to help supplement PT and maintain gains made in PT.  Patient will improve FOTO score to >/= predicted (71) to show improvement in condition.   Patient goal: Patient will be able to walk independently with no pain to  help with returning to work.   Patient will improve hip abduction strength to >/= 3+/5 to help with stair negotiation.   Patient will demonstrate a TUG of </= 13 sec to demonstrate decreased fall risk.   Plan     Plan of care Certification: 3/6/2024 to 6/10/2024.    Outpatient Physical Therapy 1-2 times weekly for 12 weeks to include the following interventions: Electrical Stimulation , Gait Training, Manual Therapy, Moist Heat/ Ice, Neuromuscular Re-ed, Patient Education, Self Care, Therapeutic Activities, and Therapeutic Exercise.     Nhi Springer, PT, DPT, OCS

## 2024-03-25 ENCOUNTER — CLINICAL SUPPORT (OUTPATIENT)
Dept: REHABILITATION | Facility: HOSPITAL | Age: 59
End: 2024-03-25
Payer: COMMERCIAL

## 2024-03-25 DIAGNOSIS — M25.672 DECREASED RANGE OF MOTION OF LEFT ANKLE: ICD-10-CM

## 2024-03-25 DIAGNOSIS — R26.9 GAIT ABNORMALITY: Primary | ICD-10-CM

## 2024-03-25 DIAGNOSIS — R29.898 DECREASED STRENGTH OF LOWER EXTREMITY: ICD-10-CM

## 2024-03-25 PROCEDURE — 97530 THERAPEUTIC ACTIVITIES: CPT

## 2024-03-25 PROCEDURE — 97112 NEUROMUSCULAR REEDUCATION: CPT

## 2024-03-25 PROCEDURE — 97140 MANUAL THERAPY 1/> REGIONS: CPT

## 2024-03-31 NOTE — PROGRESS NOTES
[facility]   Physical Therapy Treatment Note     Name: Rika June  Clinic Number: [id number]    Therapy Diagnosis:   Encounter Diagnoses   Name Primary?    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 [S92.302K], Muscle weakness of lower extremity [M62.81]   Evaluation Date: 3/6/2024  Authorization Period Expiration: 12/31/2024  Plan of Care Expiration: 6/10/2024  Progress Note Due: 4/10/2024  Visit # / Visits authorized: 3/20   FOTO: 1/3    PTA Visit #: 0/5     FOTO first follow up:   FOTO second follow up:     Precautions: Standard and Weightbearing     Time In: 2:00 pm    Time Out: 2:54 pm    Total Billable Time: 54 minutes     SUBJECTIVE     Pt reports: She is ready to return to work and not sit at home anymore but has not really been doing much strengthening on her own and once returned to work will be hard to complete therapy with 12 hour shifts and significant drive to work.  She was compliant with home exercise program.  Response to previous treatment: Independent in HEP   Functional change: Ongoing     Pain: 2/10  Location: left foot      OBJECTIVE     Objective Measures updated at progress report unless specified.     Observation 3/18/2024:    Gait: Patient ambulates into clinic independently and displays antalgic gait, decreased step length on right, limited push-off on left, decreased dorsiflexion and increased hip ER during swing and initial contact on left.     Ankle Range of Motion:   Right AROM Left AROM/PROM   Dorsiflexion 4 degrees  -1 degrees (lack) / 2 degrees    Plantarflexion 55 degrees  40 degrees / 48 degrees    Inversion 30 degrees  20 degrees / 22 degrees   Eversion  18 degrees  10 degrees / 12  "degrees        Treatment     Rika received the treatments listed below:      therapeutic exercises to develop strength, endurance, ROM, flexibility, posture, and core stabilization for 08 minutes including:    Gastroc/soleus stretch 4 x 20" holds   LAQ 4# 3 x 10 x 3" holds     Not Today:   Step downs with BlueTB for TC distraction 2 x 10 reps 4-inch with UE assist for balance   NuStep x 5' for range of motion, strength, and cardiovascular endurance   Sidelying clamshells with RedTB 2 x 12 reps each   Standing DF mobilizations with BlackTB on 4-inch 1 x 15 x 3" holds   Seated hip ER with RedTB 3 x 8 reps   Ankle inversion with YellowTB 2 x 10 reps     manual therapy techniques: Joint mobilizations and Soft tissue Mobilization were applied to the: Hip, ankle for 10 minutes, including:    (L) Talocrural AP joint mobilizations grade II-III  (L) Subtalar joint mobilizations grade II-III  (L) Great toe joint mobility     neuromuscular re-education activities to improve: Balance, Coordination, Kinesthetic, Sense, Proprioception, and Posture for 26 minutes. The following activities were included:    Assessment as above   DL shuttle with short arch 3 x 10 reps 62.5#   SL shuttle with short arch 3 x 8 reps 37.5#   Standing short arches 2 x 10 x 5" holds   Standing short arch with mini squat 2 x 10 reps   Lateral band walks RedTB 4 laps each direction   Paloff press 15# 2 x 8 reps each direction     Not Today:  FHL with YellowTB 3 x 6-8 reps with emphasis on eccentric control   DL shuttle heel raises 3 x 10 reps 37.5#  PF with GreenTB with emphasis on eccentric control 3 x 8 reps   Seated short arches 2 x 10 x 5" holds   Seated towel crunches x 3'  DL shuttle heel raises 25# 2 x 10 reps with tennis ball 3" holds   Seated heel raises 2 x 10 x 3" holds with tennis ball between heels   Manual resistance FHL strengthening 1 x 10 reps   PPT 2 x 10 x 5" holds     therapeutic activities to improve functional performance for 10 " "minutes, including:    Step ups on 6-inch step 3 x 6 reps with opp march 2" holds   Standing heel raises with tennis ball between heels 3 x 8 x 3" holds    Not Today:  Recumbent bike   Step ups   Step downs   Sit to stands with hip/hinge pattern     gait training to improve functional mobility and safety for 00 minutes, including:      Patient Education and Home Exercises     Home Exercises Provided and Patient Education Provided     Education provided:   - Continue with HEP  - Weaning out of her boot, progressively loading without overloading and monitoring edema/pain   - Gait mechanics, importance of proximal strengthening     Written Home Exercises Provided: yes. Exercises were reviewed and Rika was able to demonstrate them prior to the end of the session.  Rika demonstrated good  understanding of the education provided. See EMR under Patient Instructions for exercises provided during therapy sessions    ASSESSMENT     Rika tolerated therapy session fairly well. Increased focus on loading and foot intrinsics to help prepare for returning to work. She fatigues quickly with therex requiring rest breaks throughout. Continued manual to improve joint mobility with good tolerance. Was able to increase load on shuttle as well as added standing heel raises with adequate challenge. She was educated on with returning to work taking breaks when able and elevating to help with swelling as well as ensuring continuing to strengthen and normalize gait mechanics working heel to toe. She was significantly fatigued end of session. Will continue to monitor and progress as able.     Rika Is progressing well towards her goals.   Pt prognosis is Good.     Pt will continue to benefit from skilled outpatient physical therapy to address the deficits listed in the problem list box on initial evaluation, provide pt/family education and to maximize pt's level of independence in the home and community environment.     Pt's " spiritual, cultural and educational needs considered and pt agreeable to plan of care and goals.     Anticipated barriers to physical therapy: Scheduling, chronicity of symptoms     Goals:   Short Term Goals: 4-5 weeks (Progressing, not met)  Patient will be independent in initial HEP to help supplement PT. - MET  Patient will improve left ankle dorsiflexion range of motion by >/= 5 degrees to help with gait mechanics.   Patient will improve pain at its worst to </= 3/10 to help improve quality of life. - MET  Patient will improve single leg balance to >/= 10 sec to help decrease fall risk.      Long Term Goals: 10-12 weeks (Progressing, not met)  Patient will be independent in updated HEP to help supplement PT and maintain gains made in PT.  Patient will improve FOTO score to >/= predicted (71) to show improvement in condition.   Patient goal: Patient will be able to walk independently with no pain to help with returning to work.   Patient will improve hip abduction strength to >/= 3+/5 to help with stair negotiation.   Patient will demonstrate a TUG of </= 13 sec to demonstrate decreased fall risk.     PLAN   Plan of care Certification: 3/6/2024 to 6/10/2024.     Continue with current plan of care with emphasis on ankle mobility, foot intrinsics strengthening, proximal lower extremity strengthening, and gait mechanics.     Nhi Springer, PT, DPT, OCS

## 2024-04-01 ENCOUNTER — TELEPHONE (OUTPATIENT)
Dept: PODIATRY | Facility: CLINIC | Age: 59
End: 2024-04-01
Payer: COMMERCIAL

## 2024-04-01 NOTE — TELEPHONE ENCOUNTER
Called patient back regarding her message.  She would like her handicapped license renewed.  Filled out the form for the patient and she will come by tomorrow to pick it up from up front.  She has no more questions at this time.     Kimberly

## 2024-04-29 ENCOUNTER — HOSPITAL ENCOUNTER (OUTPATIENT)
Dept: RADIOLOGY | Facility: HOSPITAL | Age: 59
Discharge: HOME | End: 2024-04-29
Attending: PODIATRIST
Payer: COMMERCIAL

## 2024-04-29 ENCOUNTER — OFFICE VISIT (OUTPATIENT)
Dept: PODIATRY | Facility: CLINIC | Age: 59
End: 2024-04-29
Payer: COMMERCIAL

## 2024-04-29 VITALS
WEIGHT: 164.88 LBS | HEART RATE: 83 BPM | BODY MASS INDEX: 33.24 KG/M2 | SYSTOLIC BLOOD PRESSURE: 142 MMHG | DIASTOLIC BLOOD PRESSURE: 92 MMHG | HEIGHT: 59 IN

## 2024-04-29 DIAGNOSIS — S92.302K: ICD-10-CM

## 2024-04-29 DIAGNOSIS — G57.82 INTERDIGITAL NEUROMA OF LEFT FOOT: ICD-10-CM

## 2024-04-29 DIAGNOSIS — S92.302K: Primary | ICD-10-CM

## 2024-04-29 PROCEDURE — 1160F RVW MEDS BY RX/DR IN RCRD: CPT | Mod: CPTII,S$GLB,, | Performed by: PODIATRIST

## 2024-04-29 PROCEDURE — 99999 PR PBB SHADOW E&M-EST. PATIENT-LVL III: CPT | Mod: PBBFAC,,, | Performed by: PODIATRIST

## 2024-04-29 PROCEDURE — 73630 X-RAY EXAM OF FOOT: CPT | Mod: 26,LT,, | Performed by: RADIOLOGY

## 2024-04-29 PROCEDURE — 73630 X-RAY EXAM OF FOOT: CPT | Mod: TC,PN,LT

## 2024-04-29 PROCEDURE — 1159F MED LIST DOCD IN RCRD: CPT | Mod: CPTII,S$GLB,, | Performed by: PODIATRIST

## 2024-04-29 PROCEDURE — 3077F SYST BP >= 140 MM HG: CPT | Mod: CPTII,S$GLB,, | Performed by: PODIATRIST

## 2024-04-29 PROCEDURE — 99213 OFFICE O/P EST LOW 20 MIN: CPT | Mod: S$GLB,,, | Performed by: PODIATRIST

## 2024-04-29 PROCEDURE — 3080F DIAST BP >= 90 MM HG: CPT | Mod: CPTII,S$GLB,, | Performed by: PODIATRIST

## 2024-04-29 PROCEDURE — 3008F BODY MASS INDEX DOCD: CPT | Mod: CPTII,S$GLB,, | Performed by: PODIATRIST

## 2024-04-29 RX ORDER — IBUPROFEN 800 MG/1
800 TABLET ORAL EVERY 6 HOURS PRN
Qty: 30 TABLET | Refills: 1 | Status: SHIPPED | OUTPATIENT
Start: 2024-04-29

## 2024-04-29 NOTE — PROGRESS NOTES
Subjective:      Patient ID: Rika June is a 58 y.o. female.    Chief Complaint: Follow-up (Left foot she complains of her left 5th toe feeing numb)      Patient is a worker's compensation referral for chronic right ankle sprain which was initially treated at urgent care on 10/29/2020.  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  States that she has moderate amount of stiffness and swelling to the right ankle and the pain is aggravated by standing or walking.  She has not worn orthopedic boot or tendon physical therapy.  Recent MRI and CT confirmed nondisplaced posterior malleolus fracture of the right ankle.    04/22/2021:  Follow-up for nondisplaced posterior malleolus fracture of the right ankle x8 weeks.  She received a bone stimulator approximately a few days after her last visit has been using it daily as recommended.  She has been ambulating with the orthopedic boot and states that she has no pain while she is in a boot however she does have pain she takes the boot off and attempts to walk or she applies pressure to different points around her ankle.  She states that she still has stiffness and pain localized to the area.  Patient also states that she fell approximately 3 weeks ago while wearing the boot.  States she was descending down the stairs and landed towards the right knee.  She expressed that she did not have any pain during the fall or afterwards.    05/27/2021:  Follow-up for chronic right ankle pain with a history of delayed union of the posterior malleolar nondisplaced fracture.  She also has an osteochondral defect to the medial talar dome for which she is receiving offloading with an orthopedic boot.  She relates that she does not have much pain when she is at rest or walking with the orthopedic boot.  She does describe aching sensation when she tries to walk around her home without the boot on.  She also describes intermittent burning  pain as increasing pointing to the medial hindfoot/ankle area traveling along the bottom of the foot.  States that her pain is manageable unless she touches an area around the ankle.    07/29/2021:  Presents ambulating with her orthopedic boot to the right lower extremity.  She has no pain with ambulation or activity while using the boot.  She previously completed her nerve conduction study which was normal however cannot tolerate the EMG.  Relates that she does not want to miss any time from work because she fears that her employment is in jeopardy.  She is also requesting to have some the restrictions lifted so that she can perform her duties while at work.  She works as a supervisor in a corrections facility.      08/25/2021:  Presents today for reassessment of the right ankle.  States that she has been ambulating with the ankle brace and a tennis shoe and experiences no significant pain.  She is able to work and perform her regular duties at work with the ankle brace and is requesting removal of her previous restrictions.  States that she will experience stiffness with the 1st few steps in the morning stating pain is rated as 4/10 that will improve significantly with moving around.      08/29/2022:  Follow up for chronic right ankle pain.  It has been over a year since her last examination.  She relates that she has a feeling of instability to the right ankle like her ankle is going to give out when she walks.  She is afraid to place too much weight on her foot.  She wears her brace sometimes however with the current shoe gear that she wears at work her brace is not fit in it.  She works at a [employer].  She also complains of weakness to the right lower extremity.  Pain aggravated by increased periods of standing and/or walking to the right ankle.    09/26/2022:  Follow-up for right ankle pain following MRI.  Patient relates that she is unable to wear her ankle brace for tennis shoes for long  periods of time at work.  States that the pain will increase with increased duration standing or walking.    11/28/2022:  Follow-up for chronic right ankle pain.  Patient is not wearing her ankle brace or compression stocking AMA.  She reports pain will wax and wane to the right ankle depending on her activity.  Notes that the pain and swelling to the right ankle resolve with rest.  She continues to work.  Wearing flip-flops today.  Relates that she worked last night.  She has not gone for fitting of right AFO as prescribed.    02/28/2023:  Follow-up for chronic right ankle pain.  She receives the [place] brace and has been wearing it at work.  Reports improvement in subjective pain while working which is when she has a significant flare-up in pain.  She is able to perform her duties and has no reduced ability to perform her activities of daily living.  She is ambulating with flip-flops today.    10/31/2023:  Follow-up for right foot injury which was initially treated at [facility] yesterday with left foot x-ray completed and read per radiology.  Imaging not directly available to us today.  Patient relates pain and swelling pointing to the lateral left midfoot region.  She was not dispensed any DME.  She is ambulating with Crocs.  Patient was also due for follow-up for chronic right ankle pain secondary to posttraumatic arthritis.  She wears an AFO while working which helps reduce her pain and reduce the incidence of flare-ups.    11/13/2023:  Follow-up for 5th metatarsal base fracture left foot x2 weeks.  Notes that she gets aching pain throughout this area at rest.  She is been performing minimal activity with the Cam boot and resting as advised.  She is no longer on vitamin-D when questioned.  She does have a history of vitamin-D deficiency.    12/19/2023:  Follow-up for 5th metatarsal base fracture x6 weeks.  Offloaded with Cam boot.  She has stopped working and is home with  "restrictions.    2024:  Follow-up for 5th metatarsal base avulsion fracture.  Relates that she has residual intermittent pain to this area but it is improved overall.  She has been using the cam boot.  Has not returned to work.  Also inquiring about treatment for discolored and thickened toenails to both feet.  States that she stopped receiving pedicures.    2024: Follow-up for delayed union/nonunion 5th metatarsal June fracture/avulsion fracture.  Relates no significant pain but she does note when she walks without the boot 1st thing in the morning she has some stiffness.  She has been using the bone stimulator for 3 hours a day as prescribed.  She recalls hitting her foot a few days ago which caused him discomfort but that has improved.    2024:  Follow-up for 5th metatarsal fracture.  She returned to work on 2024.  She describes numbness to the left 5th toe and in intermittent sharp pain.  Takes ibuprofen as needed for pain.  She is wearing slip-on athletic style shoes today.  Completed a shift last night of 12 hours.  Left foot x-ray completed today.    Vitals:    24 1120   BP: (!) 142/92   Pulse: 83   Weight: 74.8 kg (164 lb 14.5 oz)   Height: 4' 11" (1.499 m)   PainSc: 0-No pain      Past Medical History:   Diagnosis Date    Allergic rhinitis     Allergy     Asthma        Past Surgical History:   Procedure Laterality Date     SECTION      TUBAL LIGATION         Family History   Problem Relation Name Age of Onset    Breast cancer Mother      Cancer Father          throat    Diabetes Maternal Grandmother      Ovarian cancer Neg Hx      Stroke Neg Hx      Hypertension Neg Hx      Melanoma Neg Hx         Social History     Socioeconomic History    Marital status: Legally    Tobacco Use    Smoking status: Never    Smokeless tobacco: Never   Substance and Sexual Activity    Alcohol use: No    Drug use: No    Sexual activity: Yes     Partners: Male   Other Topics " Concern    Are you pregnant or think you may be? No    Breast-feeding No       Current Outpatient Medications   Medication Sig Dispense Refill    albuterol (PROVENTIL) 2.5 mg /3 mL (0.083 %) nebulizer solution Take 3 mLs (2.5 mg total) by nebulization every 6 (six) hours as needed for Wheezing. 1 Box 0    albuterol (PROVENTIL/VENTOLIN HFA) 90 mcg/actuation inhaler Inhale 2 puffs into the lungs every 6 (six) hours as needed for Wheezing or Shortness of Breath. 18 g 2    calcium carbonate (OS-SHARMILA) 500 mg calcium (1,250 mg) tablet Take 1 tablet (500 mg total) by mouth once daily. 90 tablet 3    cholecalciferol, vitamin D3, 1,250 mcg (50,000 unit) capsule Take 1 capsule (50,000 Units total) by mouth every 7 days. 12 capsule 2    fluticasone propionate (FLONASE) 50 mcg/actuation nasal spray SHAKE LIQUID AND USE 1 SPRAY(50 MCG) IN EACH NOSTRIL EVERY DAY 16 g 5    leg brace (ANKLE BRACE) Misc Dispense for right ankle 1 each 0    methocarbamoL (ROBAXIN) 500 MG Tab Take 1 tablet (500 mg total) by mouth 3 (three) times daily as needed (pain). 21 tablet 0    predniSONE (DELTASONE) 20 MG tablet Take 1 tablet (20 mg total) by mouth once daily. 4 tablet 0    triamcinolone acetonide 0.1% (KENALOG) 0.1 % ointment Apply topically 2 (two) times daily. 30 g 0    vitamin D3-vitamin K2 137.5-200 mcg Tab Take one by mouth daily 90 tablet 2    diclofenac sodium (VOLTAREN) 1 % Gel Apply 2 g topically 4 (four) times daily. for 10 days 100 g 5    ibuprofen (ADVIL,MOTRIN) 800 MG tablet Take 1 tablet (800 mg total) by mouth every 6 (six) hours as needed for Pain. 30 tablet 1    olopatadine (PATANOL) 0.1 % ophthalmic solution Place 1 drop into both eyes 2 (two) times daily. (Patient not taking: Reported on 9/20/2021) 5 mL 2     No current facility-administered medications for this visit.       Review of patient's allergies indicates:   Allergen Reactions    Hydrocodone-acetaminophen Nausea Only and Nausea And Vomiting     Other reaction(s):  Nausea         Review of Systems   Constitutional: Negative for chills, fever and malaise/fatigue.   HENT:  Negative for congestion and hearing loss.    Cardiovascular:  Negative for chest pain, claudication and leg swelling.   Respiratory:  Negative for cough and shortness of breath.    Skin:  Negative for color change and poor wound healing.   Musculoskeletal:  Positive for joint pain, joint swelling and stiffness. Negative for back pain, muscle cramps and muscle weakness.   Gastrointestinal:  Negative for nausea and vomiting.   Neurological:  Negative for numbness, paresthesias and weakness.   Psychiatric/Behavioral:  Negative for altered mental status.            Objective:      Physical Exam  Constitutional:       General: She is not in acute distress.  Cardiovascular:      Pulses:           Dorsalis pedis pulses are 2+ on the left side.        Posterior tibial pulses are 2+ on the left side.   Musculoskeletal:      Comments: Limitation in active range of motion to the right ankle without pain however some mild audible crepitus heard.       Mild pes planus foot structure with normal range of motion to the subtalar joint midtarsal joints right foot.    No pain on palpation overlying the styloid process but there is some pain on palpation overlying the dorsal lateral aspect of the left midfoot.  No pain with midtarsal joint range motion left foot.    Slight to mild discomfort with squeezing the distal 4th intermetatarsal space left foot.  No pain range motion of the left 5th toe.    No pain with anterior drawer which is negative.  No pain with stress inversion of the left ankle directly overlying the styloid process region.  No pain on palpation along the course of the posterior tibial tendon left hindfoot/ankle.   Skin:     General: Skin is warm.      Capillary Refill: Capillary refill takes less than 2 seconds.      Findings: No ecchymosis or erythema.      Nails: There is no clubbing.      Comments: Nails 1-5  bilateral foot are partially covered with purple nail Polish however display brown discoloration involving the distal 2/3 with underlying debris and slight loosening.   Neurological:      Mental Status: She is alert and oriented to person, place, and time.      Sensory: Sensation is intact.      Motor: Weakness present.             Assessment:       Encounter Diagnoses   Name Primary?    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      I counseled the patient on her conditions, their implications and medical management.    Left foot x-ray reviewed noting complete consolidation of the 5th metatarsal avulsion fracture.    Inspected her current shoes which are too short noting that her toes are directly at the end of the toe box.  We discussed appropriate sizing shoes in detail.  Clinically she has a Rowan's neuroma developing to the 4th intermetatarsal space with subjective numbness.  Numbness is more of a end-stage finding as we discussed however she develops more consistent pain we could consider a corticosteroid injection.  We discussed changing to shoes that fit her and provide her more support.    Refill for ibuprofen 800 mg sent to the pharmacy.  We discussed harmful effects of long-term NSAID therapy and to take the medication sparingly.    RTC p.r.n. as discussed    A portion of this note was generated by voice recognition software and may contain spelling and grammar errors.

## 2024-10-22 ENCOUNTER — TELEPHONE (OUTPATIENT)
Dept: FAMILY MEDICINE | Facility: CLINIC | Age: 59
End: 2024-10-22
Payer: COMMERCIAL

## 2024-10-22 NOTE — TELEPHONE ENCOUNTER
"----- Message from [place] sent at 10/22/2024  2:16 PM CDT -----  Name Of Caller: Rika        Provider Name: Gal Johnson        Does patient feel the need to be seen today? no        Relationship to the Pt?: patient        Contact Preference?:[phone]         What is the nature of the call?:  Symptom screen instructed patient to call 911, patient indicated that she was going call.       Symptoms: Cough, Chest Pain - Adult  Outcome: Instruct patient to Call 911 NOW!  Reason: "Heaviness" on chest    The caller accepted this outcome.  "

## 2024-10-22 NOTE — TELEPHONE ENCOUNTER
Placed a call to the pt, but no answer.  I left name and information ( number) for pt to call back

## 2024-10-22 NOTE — TELEPHONE ENCOUNTER
"----- Message from Clyde sent at 10/22/2024  2:16 PM CDT -----  Name Of Caller: Rika        Provider Name: Gal Johnson        Does patient feel the need to be seen today? no        Relationship to the Pt?: patient        Contact Preference?:[phone]         What is the nature of the call?:  Symptom screen instructed patient to call 911, patient indicated that she was going call.       Symptoms: Cough, Chest Pain - Adult  Outcome: Instruct patient to Call 911 NOW!  Reason: "Heaviness" on chest    The caller accepted this outcome.  "

## 2024-10-25 ENCOUNTER — OFFICE VISIT (OUTPATIENT)
Dept: FAMILY MEDICINE | Facility: CLINIC | Age: 59
End: 2024-10-25
Payer: COMMERCIAL

## 2024-10-25 VITALS
SYSTOLIC BLOOD PRESSURE: 150 MMHG | HEIGHT: 59 IN | BODY MASS INDEX: 33.64 KG/M2 | HEART RATE: 75 BPM | TEMPERATURE: 98 F | WEIGHT: 166.88 LBS | DIASTOLIC BLOOD PRESSURE: 100 MMHG | OXYGEN SATURATION: 97 %

## 2024-10-25 DIAGNOSIS — J45.20 MILD INTERMITTENT ASTHMA WITHOUT COMPLICATION: Primary | ICD-10-CM

## 2024-10-25 DIAGNOSIS — J30.9 ALLERGIC RHINITIS, UNSPECIFIED SEASONALITY, UNSPECIFIED TRIGGER: ICD-10-CM

## 2024-10-25 DIAGNOSIS — J45.20 MILD INTERMITTENT ASTHMA WITHOUT COMPLICATION: ICD-10-CM

## 2024-10-25 DIAGNOSIS — R05.9 COUGH IN ADULT PATIENT: Primary | ICD-10-CM

## 2024-10-25 DIAGNOSIS — R03.0 ELEVATED BLOOD PRESSURE READING: ICD-10-CM

## 2024-10-25 PROCEDURE — 99999 PR PBB SHADOW E&M-EST. PATIENT-LVL IV: CPT | Mod: PBBFAC,,,

## 2024-10-25 RX ORDER — NAPROXEN 500 MG/1
1 TABLET ORAL 2 TIMES DAILY WITH MEALS
COMMUNITY
Start: 2023-10-30 | End: 2024-10-29

## 2024-10-25 RX ORDER — ALBUTEROL SULFATE 0.83 MG/ML
2.5 SOLUTION RESPIRATORY (INHALATION) EVERY 6 HOURS PRN
Qty: 1 EACH | Refills: 0 | Status: SHIPPED | OUTPATIENT
Start: 2024-10-25

## 2024-10-25 RX ORDER — LEVOCETIRIZINE DIHYDROCHLORIDE 5 MG/1
5 TABLET, FILM COATED ORAL NIGHTLY
Qty: 30 TABLET | Refills: 11 | Status: SHIPPED | OUTPATIENT
Start: 2024-10-25 | End: 2025-10-25

## 2024-10-25 RX ORDER — ALBUTEROL SULFATE 90 UG/1
2 INHALANT RESPIRATORY (INHALATION) EVERY 6 HOURS PRN
Qty: 18 G | Refills: 2 | Status: SHIPPED | OUTPATIENT
Start: 2024-10-25 | End: 2024-10-25

## 2024-10-25 RX ORDER — FLUTICASONE PROPIONATE 50 MCG
2 SPRAY, SUSPENSION (ML) NASAL DAILY
Qty: 16 G | Refills: 5 | Status: SHIPPED | OUTPATIENT
Start: 2024-10-25

## 2024-10-25 RX ORDER — BENZONATATE 100 MG/1
100 CAPSULE ORAL 3 TIMES DAILY PRN
Qty: 30 CAPSULE | Refills: 0 | Status: SHIPPED | OUTPATIENT
Start: 2024-10-25 | End: 2024-11-04

## 2024-10-25 NOTE — PATIENT INSTRUCTIONS
- Can take Tylenol as needed, as well as some Dayquil and Nyquil  - Stay away from decongestants, such as sudafed as it can increase blood pressure.  - Can try over-the-counter hydrocortisone cream for your rash

## 2024-10-25 NOTE — PROGRESS NOTES
HPI   Rika June is a 59 y.o. female with multiple medical diagnoses as listed in the medical history and problem list that presents for   Chief Complaint   Patient presents with    Cough     Pt c/o cough and back pain x4 days.     HPI  She's been having intermittent dry cough since Monday. She reports associated sxs of some nasal congestion, rhinorrhea, and right ear pain. Patient has a history of asthma that she utilizes her albuterol PRN for and hasn't had any hospitalizations recently for. She feels that the Albuterol PRN has been effective with managing her asthma so far. Denies wheezing, chest pain, fevers, chills, sore throat, sneezing. She stated that the upper back pain is mainly associated with her cough. She has not tried any medications for it. She denies smoking cigarettes, hx of PATTI.    Assessment & Plan     Problem List Items Addressed This Visit          Pulmonary    Mild intermittent asthma without complication    Relevant Medications    albuterol (PROVENTIL/VENTOLIN HFA) 90 mcg/actuation inhaler    albuterol (PROVENTIL) 2.5 mg /3 mL (0.083 %) nebulizer solution    - No recent hospitalizations, stable while on her Albuterol PRN, continue current regimen.  - Discussed potentially starting on daily controller if not controlled.   - Avoid environmental triggers, such as smoking.     Other Visit Diagnoses       Cough in adult patient    -  Primary    Relevant Medications    benzonatate (TESSALON PERLES) 100 MG capsule    - Non-productive. Trial of Tessalon perles and RTC if symptoms worsen.    Allergic rhinitis, unspecified seasonality, unspecified trigger        Relevant Medications    fluticasone propionate (FLONASE) 50 mcg/actuation nasal spray    levocetirizine (XYZAL) 5 MG tablet    - No bacterial nidus. Likely viral/allergic in nature. Treat symptomatically first and RTC if symptoms worsen.  - Tylenol PRN    Elevated blood pressure reading               - No history of diagnosed HTN. Still  elevated on repeat reading. Patient will return in one month to address health maintenance.         - Avoid decongestants. Low-sodium diet, regular aerobic exercise.     --------------------------------------------    Health Maintenance         Date Due Completion Date    HIV Screening Never done ---    TETANUS VACCINE Never done ---    Mammogram Never done ---    Colorectal Cancer Screening Never done ---    Hemoglobin A1c (Prediabetes) 06/20/2012 6/20/2011    Shingles Vaccine (1 of 2) Never done ---    Cervical Cancer Screening 09/16/2022 9/16/2019    Lipid Panel 06/03/2024 6/3/2019    Influenza Vaccine (1) 09/01/2024 10/11/2017    COVID-19 Vaccine (1 - 2024-25 season) Never done ---    RSV Vaccine (Age 60+ and Pregnant patients) (1 - 1-dose 75+ series) 08/15/2038 ---            Health maintenance reviewed. Patient will need to establish care/schedule annual for regular health maintenance.    Follow Up:  Follow up if symptoms worsen or fail to improve.    Exam     Review of Systems:  (as noted above)  Review of Systems   Constitutional:  Negative for chills, fatigue and fever.   HENT:  Positive for congestion, ear pain and rhinorrhea. Negative for hearing loss, postnasal drip, sinus pain, sneezing, sore throat and trouble swallowing.    Eyes:  Negative for visual disturbance.   Respiratory:  Positive for cough. Negative for chest tightness, shortness of breath and wheezing.    Cardiovascular:  Negative for chest pain, palpitations and leg swelling.   Genitourinary:  Negative for difficulty urinating and hematuria.   Musculoskeletal:  Positive for back pain (exacerbated with cough). Negative for gait problem and myalgias.   Neurological:  Negative for dizziness, light-headedness, numbness and headaches.   Psychiatric/Behavioral:  The patient is not nervous/anxious.        Physical Exam  Constitutional:       Appearance: Normal appearance.   HENT:      Head: Normocephalic.      Right Ear: A middle ear effusion is  "present. Tympanic membrane is not erythematous or bulging.      Left Ear:  No middle ear effusion.      Nose: Congestion and rhinorrhea present.      Right Turbinates: Swollen.      Left Turbinates: Swollen.      Mouth/Throat:      Pharynx: No oropharyngeal exudate or posterior oropharyngeal erythema.   Eyes:      Extraocular Movements: Extraocular movements intact.      Conjunctiva/sclera: Conjunctivae normal.   Cardiovascular:      Rate and Rhythm: Normal rate and regular rhythm.      Pulses: Normal pulses.      Heart sounds: Normal heart sounds. No murmur heard.     No friction rub. No gallop.   Pulmonary:      Effort: Pulmonary effort is normal.      Breath sounds: Normal breath sounds.   Musculoskeletal:      Cervical back: Neck supple.   Lymphadenopathy:      Cervical: No cervical adenopathy.   Skin:     General: Skin is warm and dry.   Neurological:      Mental Status: She is alert and oriented to person, place, and time.      Motor: No weakness.       Vitals:    10/25/24 1047 10/25/24 1124   BP: (!) 166/98 (!) 150/100   BP Location: Left arm    Patient Position: Sitting    Pulse: 75    Temp: 97.8 °F (36.6 °C)    TempSrc: Oral    SpO2: 97%    Weight: 75.7 kg (166 lb 14.2 oz)    Height: 4' 11" (1.499 m)       Body mass index is 33.71 kg/m².        History     Past Medical History:   Diagnosis Date    Allergic rhinitis     Allergy     Asthma        Past Surgical History:   Procedure Laterality Date     SECTION      TUBAL LIGATION         Social History     Socioeconomic History    Marital status: Legally    Tobacco Use    Smoking status: Never    Smokeless tobacco: Never   Substance and Sexual Activity    Alcohol use: No    Drug use: No    Sexual activity: Yes     Partners: Male   Other Topics Concern    Are you pregnant or think you may be? No    Breast-feeding No       Family History   Problem Relation Name Age of Onset    Breast cancer Mother      Cancer Father          throat    Diabetes " Maternal Grandmother      Ovarian cancer Neg Hx      Stroke Neg Hx      Hypertension Neg Hx      Melanoma Neg Hx         Allergies and Medications: (updated and reviewed)  Review of patient's allergies indicates:   Allergen Reactions    Hydrocodone-acetaminophen Nausea Only and Nausea And Vomiting     Other reaction(s): Nausea     Current Outpatient Medications   Medication Sig Dispense Refill    cholecalciferol, vitamin D3, 1,250 mcg (50,000 unit) capsule Take 1 capsule (50,000 Units total) by mouth every 7 days. 12 capsule 2    ibuprofen (ADVIL,MOTRIN) 800 MG tablet Take 1 tablet (800 mg total) by mouth every 6 (six) hours as needed for Pain. 30 tablet 1    leg brace (ANKLE BRACE) [facility] Dispense for right ankle 1 each 0    methocarbamoL (ROBAXIN) 500 MG Tab Take 1 tablet (500 mg total) by mouth 3 (three) times daily as needed (pain). 21 tablet 0    naproxen (NAPROSYN) 500 MG tablet Take 1 tablet by mouth 2 (two) times daily with meals.      triamcinolone acetonide 0.1% (KENALOG) 0.1 % ointment Apply topically 2 (two) times daily. 30 g 0    vitamin D3-vitamin K2 137.5-200 mcg Tab Take one by mouth daily 90 tablet 2    albuterol (PROVENTIL) 2.5 mg /3 mL (0.083 %) nebulizer solution Take 3 mLs (2.5 mg total) by nebulization every 6 (six) hours as needed for Wheezing. 1 each 0    albuterol (PROVENTIL/VENTOLIN HFA) 90 mcg/actuation inhaler Inhale 2 puffs into the lungs every 6 (six) hours as needed for Wheezing or Shortness of Breath. 18 g 2    benzonatate (TESSALON PERLES) 100 MG capsule Take 1 capsule (100 mg total) by mouth 3 (three) times daily as needed for Cough. 30 capsule 0    fluticasone propionate (FLONASE) 50 mcg/actuation nasal spray 2 sprays (100 mcg total) by Each Nostril route once daily. 16 g 5    levocetirizine (XYZAL) 5 MG tablet Take 1 tablet (5 mg total) by mouth every evening. 30 tablet 11     No current facility-administered medications for this visit.       Patient Care Team:  Elizabeth Santo MD  as PCP - General (Internal Medicine)         - The patient is given an After Visit Summary that lists all medications with directions, allergies, education, orders placed during this encounter and follow-up instructions.      - I have reviewed the patient's medical information including past medical, family, and social history sections including the medications and allergies.      - We discussed the patient's current medications.          Shaun Moreno NP

## 2024-11-25 ENCOUNTER — OFFICE VISIT (OUTPATIENT)
Dept: FAMILY MEDICINE | Facility: CLINIC | Age: 59
End: 2024-11-25
Payer: COMMERCIAL

## 2024-11-25 VITALS
HEART RATE: 78 BPM | DIASTOLIC BLOOD PRESSURE: 88 MMHG | HEIGHT: 59 IN | TEMPERATURE: 98 F | SYSTOLIC BLOOD PRESSURE: 162 MMHG | BODY MASS INDEX: 33.43 KG/M2 | WEIGHT: 165.81 LBS | OXYGEN SATURATION: 98 %

## 2024-11-25 DIAGNOSIS — E55.9 VITAMIN D DEFICIENCY: Chronic | ICD-10-CM

## 2024-11-25 DIAGNOSIS — E04.1 THYROID NODULE: ICD-10-CM

## 2024-11-25 DIAGNOSIS — E04.9 GOITER: ICD-10-CM

## 2024-11-25 DIAGNOSIS — Z13.220 ENCOUNTER FOR LIPID SCREENING FOR CARDIOVASCULAR DISEASE: ICD-10-CM

## 2024-11-25 DIAGNOSIS — I10 HYPERTENSION, UNSPECIFIED TYPE: ICD-10-CM

## 2024-11-25 DIAGNOSIS — Z13.6 ENCOUNTER FOR LIPID SCREENING FOR CARDIOVASCULAR DISEASE: ICD-10-CM

## 2024-11-25 DIAGNOSIS — Z12.31 SCREENING MAMMOGRAM FOR BREAST CANCER: ICD-10-CM

## 2024-11-25 DIAGNOSIS — Z12.11 SCREEN FOR COLON CANCER: ICD-10-CM

## 2024-11-25 DIAGNOSIS — Z00.00 ROUTINE GENERAL MEDICAL EXAMINATION AT A HEALTH CARE FACILITY: ICD-10-CM

## 2024-11-25 DIAGNOSIS — Z23 NEED FOR VACCINATION: Primary | ICD-10-CM

## 2024-11-25 PROCEDURE — 99213 OFFICE O/P EST LOW 20 MIN: CPT | Mod: S$GLB,,,

## 2024-11-25 PROCEDURE — 1159F MED LIST DOCD IN RCRD: CPT | Mod: CPTII,S$GLB,,

## 2024-11-25 PROCEDURE — 1160F RVW MEDS BY RX/DR IN RCRD: CPT | Mod: CPTII,S$GLB,,

## 2024-11-25 PROCEDURE — 3008F BODY MASS INDEX DOCD: CPT | Mod: CPTII,S$GLB,,

## 2024-11-25 PROCEDURE — 3079F DIAST BP 80-89 MM HG: CPT | Mod: CPTII,S$GLB,,

## 2024-11-25 PROCEDURE — 99999 PR PBB SHADOW E&M-EST. PATIENT-LVL V: CPT | Mod: PBBFAC,,,

## 2024-11-25 PROCEDURE — 3077F SYST BP >= 140 MM HG: CPT | Mod: CPTII,S$GLB,,

## 2024-11-25 RX ORDER — AMLODIPINE BESYLATE 5 MG/1
5 TABLET ORAL DAILY
Qty: 30 TABLET | Refills: 3 | Status: SHIPPED | OUTPATIENT
Start: 2024-11-25

## 2024-11-25 NOTE — PROGRESS NOTES
HPI     Rika June is a 59 y.o. female with multiple medical diagnoses as listed in the medical history and problem list that presents for   Chief Complaint   Patient presents with    Annual Exam       HPI  Patient last seen by me on 10/25/24 for URI sxs. She is doing well today overall, has not had annual done for some time now.  Her blood pressure has been elevated within the last few bp checks with us and is not currently on an antihypertensive.  She does have a hx of thyroid nodule/goiter, and last time her ultrasound was checked was back in 2013. Had a US fine needle aspiration back in 2013, and had benign colloid nodule and was instructed to repeat ultrasound in one year. Denies any issues with swallowing, cold/heat intolerance, palpitations, throat pain.  Has a hx of asthma, but she has not had any hospitalizations or exacerbations within the past year for this and utilizes her Albuterol nebulizer PRN.  On hx, mother with hx of breast cancer, but denies family hx of colon cancer.  She denies chest pain, chest tightness, shortness of breath, cough, congestion, leg swelling, bowel movement issues.     Assessment & Plan     Problem List Items Addressed This Visit          Endocrine    Vitamin D deficiency (Chronic)    Relevant Orders    Vitamin D  - Recheck today and treat as needed.      Thyroid nodule    Relevant Orders    TSH    US Soft Tissue Head Neck    - Patient is overdue, will recheck ultrasound and follow up with treatments based on tests.      Goiter, unspecified    Overview     Dx updated per 2019 IMO Load         Relevant Orders    US Soft Tissue Head Neck    - Patient is due, will recheck ultrasound and follow up with treatments based on tests.     Other Visit Diagnoses       Need for vaccination    -  Primary    Relevant Medications    varicella zoster (Shingrix) IM vaccine (>/= 49 yo) (Start on 11/26/2024 12:00 AM)    - As ordered, patient will receive tomorrow.      Routine general medical  examination at a health care facility        Relevant Orders    CBC W/ AUTO DIFFERENTIAL    COMPREHENSIVE METABOLIC PANEL    LIPID PANEL    TSH    HEMOGLOBIN A1C    Vitamin D    Ambulatory referral/consult to Obstetrics / Gynecology    - Address all outstanding care gaps - patient declined flu/COVID vaccines and HIV testing today. She did mention receiving Shingles first dose vaccine previously, but is unsure of exact date. No documentation on chart. Due for cervical cancer screening, please OB/GYN referral.       Hypertension, unspecified type        Relevant Medications    amLODIPine (NORVASC) 5 MG tablet    Other Relevant Orders    CBC W/ AUTO DIFFERENTIAL    COMPREHENSIVE METABOLIC PANEL    - Elevated on previous office visits, will initiate HTN treatment and recheck in 2 weeks.  - Keep bp goal at <140/90.      Encounter for lipid screening for cardiovascular disease        Relevant Orders    LIPID PANEL    - Recheck today and treat as needed.    BMI 33.0-33.9,adult        Relevant Orders    HEMOGLOBIN A1C    Screening mammogram for breast cancer        Relevant Orders    Mammo Digital Screening Bilat w/ Bucky    - Recheck today and treat as needed.    Screen for colon cancer        Relevant Orders    Cologuard Screening (Multitarget Stool DNA)    - Discussed different options of colorectal cancer screening (Fitkit, Cologuard, and Colonoscopy) and patient preferred to try out Cologuard.     --------------------------------------------    Health Maintenance         Date Due Completion Date    HIV Screening Never done ---    TETANUS VACCINE Never done ---    Mammogram Never done ---    Colorectal Cancer Screening Never done ---    Shingles Vaccine (1 of 2) Never done ---    Hemoglobin A1c (Diabetic Prevention Screening) 06/20/2014 6/20/2011    Cervical Cancer Screening 09/16/2022 9/16/2019    RSV Vaccine (Age 60+ and Pregnant patients) (1 - Risk 60-74 years 1-dose series) Never done ---    Lipid Panel 06/03/2024  6/3/2019            Health maintenance reviewed. Ordered outstanding care gaps. Patient declined flu vaccine    Follow Up:  Follow up if symptoms worsen or fail to improve.    Exam     Review of Systems:  (as noted above)  Review of Systems   Constitutional:  Negative for activity change, chills, fatigue and fever.   HENT:  Negative for congestion, sore throat and trouble swallowing.    Respiratory:  Negative for cough, chest tightness, shortness of breath and wheezing.    Cardiovascular:  Negative for chest pain, palpitations and leg swelling.   Gastrointestinal:  Negative for abdominal pain, diarrhea, nausea and vomiting.   Genitourinary:  Negative for difficulty urinating and menstrual problem.   Musculoskeletal:  Negative for arthralgias, back pain, gait problem and myalgias.   Skin:  Negative for color change and rash.   Neurological:  Negative for dizziness, weakness, light-headedness and numbness.   Hematological:  Does not bruise/bleed easily.   Psychiatric/Behavioral:  Negative for sleep disturbance. The patient is not nervous/anxious.        Physical Exam  Constitutional:       Appearance: Normal appearance.   HENT:      Head: Normocephalic and atraumatic.      Right Ear: Tympanic membrane and external ear normal. There is impacted cerumen.      Left Ear: Tympanic membrane and external ear normal. There is impacted cerumen.      Nose: No congestion or rhinorrhea.      Mouth/Throat:      Pharynx: No posterior oropharyngeal erythema.   Eyes:      Extraocular Movements: Extraocular movements intact.      Conjunctiva/sclera: Conjunctivae normal.   Neck:      Thyroid: Thyromegaly present.   Cardiovascular:      Rate and Rhythm: Normal rate and regular rhythm.      Pulses: Normal pulses.      Heart sounds: Normal heart sounds. No murmur heard.     No friction rub. No gallop.   Pulmonary:      Effort: Pulmonary effort is normal.      Breath sounds: Normal breath sounds.   Abdominal:      General: Bowel sounds are  "normal.      Palpations: Abdomen is soft.      Tenderness: There is no abdominal tenderness. There is no guarding or rebound.   Musculoskeletal:      Cervical back: Normal range of motion and neck supple. No tenderness.   Lymphadenopathy:      Cervical: No cervical adenopathy.   Skin:     General: Skin is warm and dry.      Capillary Refill: Capillary refill takes less than 2 seconds.   Neurological:      General: No focal deficit present.      Mental Status: She is alert and oriented to person, place, and time.   Psychiatric:         Mood and Affect: Mood normal.         Behavior: Behavior normal.       Vitals:    11/25/24 1008   BP: (!) 162/88   BP Location: Left arm   Patient Position: Sitting   Pulse: 78   Temp: 98 °F (36.7 °C)   TempSrc: Oral   SpO2: 98%   Weight: 75.2 kg (165 lb 12.6 oz)   Height: 4' 11" (1.499 m)      Body mass index is 33.48 kg/m².        History     Past Medical History:   Diagnosis Date    Allergic rhinitis     Allergy     Asthma        Family History   Problem Relation Name Age of Onset    Breast cancer Mother      Cancer Father          throat    Diabetes Maternal Grandmother      Ovarian cancer Neg Hx      Stroke Neg Hx      Hypertension Neg Hx      Melanoma Neg Hx         Allergies and Medications: (updated and reviewed)  Review of patient's allergies indicates:   Allergen Reactions    Hydrocodone-acetaminophen Nausea Only and Nausea And Vomiting     Other reaction(s): Nausea     Current Outpatient Medications   Medication Sig Dispense Refill    albuterol (PROVENTIL) 2.5 mg /3 mL (0.083 %) nebulizer solution Take 3 mLs (2.5 mg total) by nebulization every 6 (six) hours as needed for Wheezing. 1 each 0    cholecalciferol, vitamin D3, 1,250 mcg (50,000 unit) capsule Take 1 capsule (50,000 Units total) by mouth every 7 days. 12 capsule 2    fluticasone propionate (FLONASE) 50 mcg/actuation nasal spray 2 sprays (100 mcg total) by Each Nostril route once daily. 16 g 5    ibuprofen " (ADVIL,MOTRIN) 800 MG tablet Take 1 tablet (800 mg total) by mouth every 6 (six) hours as needed for Pain. 30 tablet 1    levocetirizine (XYZAL) 5 MG tablet Take 1 tablet (5 mg total) by mouth every evening. 30 tablet 11    methocarbamoL (ROBAXIN) 500 MG Tab Take 1 tablet (500 mg total) by mouth 3 (three) times daily as needed (pain). 21 tablet 0    triamcinolone acetonide 0.1% (KENALOG) 0.1 % ointment Apply topically 2 (two) times daily. 30 g 0    vitamin D3-vitamin K2 137.5-200 mcg Tab Take one by mouth daily 90 tablet 2    amLODIPine (NORVASC) 5 MG tablet Take 1 tablet (5 mg total) by mouth once daily. 30 tablet 3     Current Facility-Administered Medications   Medication Dose Route Frequency Provider Last Rate Last Admin    [START ON 11/26/2024] varicella zoster (Shingrix) IM vaccine (>/= 49 yo)  0.5 mL Intramuscular Once            Patient Care Team:  Elizabeth Santo MD as PCP - General (Internal Medicine)         - The patient is given an After Visit Summary that lists all medications with directions, allergies, education, orders placed during this encounter and follow-up instructions.      - I have reviewed the patient's medical information including past medical and family history sections including the medications and allergies.      - We discussed the patient's current medications.          Shaun Moreno NP

## 2024-11-26 ENCOUNTER — LAB VISIT (OUTPATIENT)
Dept: LAB | Facility: HOSPITAL | Age: 59
End: 2024-11-26
Payer: COMMERCIAL

## 2024-11-26 DIAGNOSIS — Z00.00 ROUTINE GENERAL MEDICAL EXAMINATION AT A HEALTH CARE FACILITY: ICD-10-CM

## 2024-11-26 DIAGNOSIS — E55.9 VITAMIN D DEFICIENCY: Chronic | ICD-10-CM

## 2024-11-26 DIAGNOSIS — Z13.220 ENCOUNTER FOR LIPID SCREENING FOR CARDIOVASCULAR DISEASE: ICD-10-CM

## 2024-11-26 DIAGNOSIS — I10 HYPERTENSION, UNSPECIFIED TYPE: ICD-10-CM

## 2024-11-26 DIAGNOSIS — Z13.6 ENCOUNTER FOR LIPID SCREENING FOR CARDIOVASCULAR DISEASE: ICD-10-CM

## 2024-11-26 DIAGNOSIS — E04.1 THYROID NODULE: ICD-10-CM

## 2024-11-26 LAB
25(OH)D3+25(OH)D2 SERPL-MCNC: 18 NG/ML (ref 30–96)
ALBUMIN SERPL BCP-MCNC: 3.6 G/DL (ref 3.5–5.2)
ALP SERPL-CCNC: 69 U/L (ref 40–150)
ALT SERPL W/O P-5'-P-CCNC: 11 U/L (ref 10–44)
ANION GAP SERPL CALC-SCNC: 6 MMOL/L (ref 8–16)
AST SERPL-CCNC: 13 U/L (ref 10–40)
BASOPHILS # BLD AUTO: 0.01 K/UL (ref 0–0.2)
BASOPHILS NFR BLD: 0.1 % (ref 0–1.9)
BILIRUB SERPL-MCNC: 0.4 MG/DL (ref 0.1–1)
BUN SERPL-MCNC: 15 MG/DL (ref 8–23)
CALCIUM SERPL-MCNC: 9.3 MG/DL (ref 8.7–10.5)
CHLORIDE SERPL-SCNC: 107 MMOL/L (ref 95–110)
CHOLEST SERPL-MCNC: 159 MG/DL (ref 120–199)
CHOLEST/HDLC SERPL: 4.1 {RATIO} (ref 2–5)
CO2 SERPL-SCNC: 29 MMOL/L (ref 23–29)
CREAT SERPL-MCNC: 1 MG/DL (ref 0.5–1.4)
DIFFERENTIAL METHOD BLD: ABNORMAL
EOSINOPHIL # BLD AUTO: 0.1 K/UL (ref 0–0.5)
EOSINOPHIL NFR BLD: 1.2 % (ref 0–8)
ERYTHROCYTE [DISTWIDTH] IN BLOOD BY AUTOMATED COUNT: 13.2 % (ref 11.5–14.5)
EST. GFR  (NO RACE VARIABLE): >60 ML/MIN/1.73 M^2
ESTIMATED AVG GLUCOSE: 111 MG/DL (ref 68–131)
GLUCOSE SERPL-MCNC: 107 MG/DL (ref 70–110)
HBA1C MFR BLD: 5.5 % (ref 4–5.6)
HCT VFR BLD AUTO: 44.6 % (ref 37–48.5)
HDLC SERPL-MCNC: 39 MG/DL (ref 40–75)
HDLC SERPL: 24.5 % (ref 20–50)
HGB BLD-MCNC: 14.1 G/DL (ref 12–16)
IMM GRANULOCYTES # BLD AUTO: 0.02 K/UL (ref 0–0.04)
IMM GRANULOCYTES NFR BLD AUTO: 0.3 % (ref 0–0.5)
LDLC SERPL CALC-MCNC: 84.8 MG/DL (ref 63–159)
LYMPHOCYTES # BLD AUTO: 2 K/UL (ref 1–4.8)
LYMPHOCYTES NFR BLD: 26.6 % (ref 18–48)
MCH RBC QN AUTO: 27.2 PG (ref 27–31)
MCHC RBC AUTO-ENTMCNC: 31.6 G/DL (ref 32–36)
MCV RBC AUTO: 86 FL (ref 82–98)
MONOCYTES # BLD AUTO: 0.4 K/UL (ref 0.3–1)
MONOCYTES NFR BLD: 4.7 % (ref 4–15)
NEUTROPHILS # BLD AUTO: 5 K/UL (ref 1.8–7.7)
NEUTROPHILS NFR BLD: 67.1 % (ref 38–73)
NONHDLC SERPL-MCNC: 120 MG/DL
NRBC BLD-RTO: 0 /100 WBC
PLATELET # BLD AUTO: 211 K/UL (ref 150–450)
PMV BLD AUTO: 11.3 FL (ref 9.2–12.9)
POTASSIUM SERPL-SCNC: 3.5 MMOL/L (ref 3.5–5.1)
PROT SERPL-MCNC: 7.1 G/DL (ref 6–8.4)
RBC # BLD AUTO: 5.19 M/UL (ref 4–5.4)
SODIUM SERPL-SCNC: 142 MMOL/L (ref 136–145)
TRIGL SERPL-MCNC: 176 MG/DL (ref 30–150)
TSH SERPL DL<=0.005 MIU/L-ACNC: 0.81 UIU/ML (ref 0.4–4)
WBC # BLD AUTO: 7.38 K/UL (ref 3.9–12.7)

## 2024-11-26 PROCEDURE — 85025 COMPLETE CBC W/AUTO DIFF WBC: CPT

## 2024-11-26 PROCEDURE — 80061 LIPID PANEL: CPT

## 2024-11-26 PROCEDURE — 82306 VITAMIN D 25 HYDROXY: CPT

## 2024-11-26 PROCEDURE — 36415 COLL VENOUS BLD VENIPUNCTURE: CPT | Mod: PN

## 2024-11-26 PROCEDURE — 83036 HEMOGLOBIN GLYCOSYLATED A1C: CPT

## 2024-11-26 PROCEDURE — 80053 COMPREHEN METABOLIC PANEL: CPT

## 2024-11-26 PROCEDURE — 84443 ASSAY THYROID STIM HORMONE: CPT

## 2025-01-18 ENCOUNTER — E-VISIT (OUTPATIENT)
Dept: FAMILY MEDICINE | Facility: CLINIC | Age: 60
End: 2025-01-18
Payer: COMMERCIAL

## 2025-01-18 DIAGNOSIS — I10 HYPERTENSION, UNSPECIFIED TYPE: Primary | ICD-10-CM

## 2025-01-21 PROCEDURE — 99499 UNLISTED E&M SERVICE: CPT | Mod: ,,, | Performed by: FAMILY MEDICINE

## 2025-01-21 RX ORDER — AMLODIPINE BESYLATE 10 MG/1
10 TABLET ORAL DAILY
Qty: 90 TABLET | Refills: 3 | Status: SHIPPED | OUTPATIENT
Start: 2025-01-21 | End: 2025-01-24 | Stop reason: SDUPTHER

## 2025-01-21 NOTE — PROGRESS NOTES
Patient ID: Rika June is a 59 y.o. female.    Chief Complaint: General Illness (Entered automatically based on patient selection in [facility].)    The patient initiated a request through [facility] on 1/18/2025 for evaluation and management with a chief complaint of General Illness (Entered automatically based on patient selection in [facility].)     I evaluated the questionnaire submission on 01/21/2025.    [facility] P[facility] Evisit [facility]-Medication    1/18/2025  5:32 PM CST - Filed by Patient   What do you need help with? Other Concern   Do you agree to participate in an E-Visit? Yes   If you have any of the following symptoms, please present to your local emergency room or call 911:  I acknowledge   What is the main issue you would like addressed today? High blood pressure   Please describe your symptoms My blood pressure won't go down even after I've started the prescribed medication. Right now it's 155/96.   Where is your problem located? Body   How severe are your symptoms? Severe   Have you had these symptoms before? Yes   How long have you been having these symptoms? For one to four weeks   Please list any medications or treatments you have used for your condition and indicate if it was effective or not. Amlodipine besyate 5mil   What makes this feel better? N/a   What makes this feel worse? N/a   Are these symptoms related to a condition that you currently have? I am not sure   What is the condition?    When were you last seen for this condition? 11/25/2024   Please describe any probable cause for these symptoms N/a   Provide any additional information you feel is important. N/a   Please attach any relevant images or files    Are you able to take your vital signs? No         Encounter Diagnosis   Name Primary?    Hypertension, unspecified type Yes        No orders of the defined types were placed in this encounter.     Medications Ordered This Encounter   Medications    amLODIPine (NORVASC) 10 MG tablet     Sig:  Take 1 tablet (10 mg total) by mouth once daily.     Dispense:  90 tablet     Refill:  3     .        No follow-ups on file.      E-Visit Time Tracking:    Day 1 Time (in minutes): 5    Total Time (in minutes): 5

## 2025-01-23 ENCOUNTER — PATIENT OUTREACH (OUTPATIENT)
Dept: ADMINISTRATIVE | Facility: HOSPITAL | Age: 60
End: 2025-01-23
Payer: COMMERCIAL

## 2025-01-24 ENCOUNTER — OFFICE VISIT (OUTPATIENT)
Dept: FAMILY MEDICINE | Facility: CLINIC | Age: 60
End: 2025-01-24
Payer: COMMERCIAL

## 2025-01-24 VITALS
HEART RATE: 94 BPM | DIASTOLIC BLOOD PRESSURE: 86 MMHG | BODY MASS INDEX: 34.45 KG/M2 | RESPIRATION RATE: 16 BRPM | SYSTOLIC BLOOD PRESSURE: 148 MMHG | WEIGHT: 170.88 LBS | TEMPERATURE: 98 F | HEIGHT: 59 IN | OXYGEN SATURATION: 98 %

## 2025-01-24 DIAGNOSIS — E04.9 GOITER: ICD-10-CM

## 2025-01-24 DIAGNOSIS — I10 HYPERTENSION, UNSPECIFIED TYPE: ICD-10-CM

## 2025-01-24 DIAGNOSIS — H65.191 OTHER ACUTE NONSUPPURATIVE OTITIS MEDIA OF RIGHT EAR, RECURRENCE NOT SPECIFIED: Primary | ICD-10-CM

## 2025-01-24 DIAGNOSIS — E55.9 VITAMIN D INSUFFICIENCY: ICD-10-CM

## 2025-01-24 PROCEDURE — 99214 OFFICE O/P EST MOD 30 MIN: CPT | Mod: S$GLB,,,

## 2025-01-24 PROCEDURE — 1159F MED LIST DOCD IN RCRD: CPT | Mod: CPTII,S$GLB,,

## 2025-01-24 PROCEDURE — 3074F SYST BP LT 130 MM HG: CPT | Mod: CPTII,S$GLB,,

## 2025-01-24 PROCEDURE — 3079F DIAST BP 80-89 MM HG: CPT | Mod: CPTII,S$GLB,,

## 2025-01-24 PROCEDURE — 1160F RVW MEDS BY RX/DR IN RCRD: CPT | Mod: CPTII,S$GLB,,

## 2025-01-24 PROCEDURE — 99999 PR PBB SHADOW E&M-EST. PATIENT-LVL IV: CPT | Mod: PBBFAC,,,

## 2025-01-24 PROCEDURE — 3008F BODY MASS INDEX DOCD: CPT | Mod: CPTII,S$GLB,,

## 2025-01-24 RX ORDER — AMOXICILLIN AND CLAVULANATE POTASSIUM 875; 125 MG/1; MG/1
1 TABLET, FILM COATED ORAL EVERY 12 HOURS
Qty: 10 TABLET | Refills: 0 | Status: SHIPPED | OUTPATIENT
Start: 2025-01-24 | End: 2025-01-29

## 2025-01-24 RX ORDER — AMLODIPINE BESYLATE 10 MG/1
10 TABLET ORAL DAILY
Qty: 90 TABLET | Refills: 1 | Status: SHIPPED | OUTPATIENT
Start: 2025-01-24 | End: 2026-01-24

## 2025-01-24 NOTE — PROGRESS NOTES
HPI     Rika June is a 59 y.o. female with multiple medical diagnoses as listed in the medical history and problem list that presents for   Chief Complaint   Patient presents with    Hypertension       HPI    Patient has been noticing since last Monday, her blood pressure has been elevated. When she checks her blood pressure at home, it runs around 180s systolic/100s diastolic. She notices that every time her pressure is elevated, she has associated mild headaches and tinnitus to both ears. She has also been experiencing left otalgia for the past week.    She reports that her daughter send her uber eats food such as beans, bbq chicken. She walks a lot during her 12 hours shift at her work as a .  Denies chest pain, chest tightness, shortness of breath, blurry vision, leg swelling    Assessment & Plan     Problem List Items Addressed This Visit          Endocrine    Goiter, unspecified    Overview     Dx updated per 2019 IMO Load    - Patient is due, will recheck ultrasound and follow up with treatments based on tests.         Other Visit Diagnoses       Other acute nonsuppurative otitis media of right ear, recurrence not specified    -  Primary    Relevant Medications    amoxicillin-clavulanate 875-125mg (AUGMENTIN) 875-125 mg per tablet    - erythematous and bulging TM to left ear on exam. Will treat with Augmentin x 5 days. Instructed she can take Tylenol for pain control.  - Instructed to call back if sxs not improving after 5 days to see if we may need to increase length to 7 days.      Hypertension, unspecified type        Relevant Medications    amLODIPine (NORVASC) 10 MG tablet    - BP today initially low, however, on repeat was elevated. Increase her Amlodipine from 5 mg to 10 mg. Instructed that side effects may include ankle swelling and to incorporate compression stockings.  - Low sodium diet, regular aerobic exercises 150 min/week, and keep BP goal <140/90.  - Reassess with home  logs as needed.      Vitamin D insufficiency               - Patient instructed to take Vitamin D3  - 1,000 IU (2-25 mcg) once daily.     --------------------------------------------    Health Maintenance         Date Due Completion Date    HIV Screening Never done ---    TETANUS VACCINE Never done ---    Mammogram Never done ---    Colorectal Cancer Screening Never done ---    Shingles Vaccine (1 of 2) Never done ---    Pneumococcal Vaccines (Age 50+) (1 of 1 - PCV) Never done ---    Cervical Cancer Screening 09/16/2022 9/16/2019    RSV Vaccine (Age 60+ and Pregnant patients) (1 - Risk 60-74 years 1-dose series) Never done ---    Hemoglobin A1c (Diabetic Prevention Screening) 11/26/2027 11/26/2024    Lipid Panel 11/26/2029 11/26/2024            Discussed the importance of overdue vaccines which were offered during this encounter. Patient declined overdue vaccines at this time    Follow Up:  Follow up in about 2 weeks (around 2/7/2025) for HTN F/U.    Exam     Review of Systems:  (as noted above)  Review of Systems   Constitutional:  Negative for activity change, diaphoresis, fatigue and fever.   HENT:  Positive for ear pain. Negative for congestion, hearing loss, sore throat and trouble swallowing.    Eyes:  Negative for photophobia.   Respiratory:  Negative for cough, chest tightness, shortness of breath and wheezing.    Gastrointestinal:  Negative for diarrhea, nausea and vomiting.   Musculoskeletal:  Negative for arthralgias, gait problem and myalgias.   Skin:  Negative for rash.   Neurological:  Positive for headaches. Negative for dizziness, weakness and light-headedness.   Psychiatric/Behavioral:  Negative for sleep disturbance. The patient is nervous/anxious.        Physical Exam  Constitutional:       General: She is not in acute distress.     Appearance: Normal appearance. She is not ill-appearing.   HENT:      Right Ear: A middle ear effusion is present. Tympanic membrane is not erythematous or  "bulging.      Left Ear: A middle ear effusion is present. Tympanic membrane is erythematous and bulging.      Nose:      Right Turbinates: Enlarged.      Left Turbinates: Enlarged.      Mouth/Throat:      Pharynx: No posterior oropharyngeal erythema.   Eyes:      Extraocular Movements: Extraocular movements intact.      Conjunctiva/sclera: Conjunctivae normal.   Cardiovascular:      Rate and Rhythm: Normal rate and regular rhythm.      Pulses: Normal pulses.      Heart sounds: Normal heart sounds. No murmur heard.     No friction rub. No gallop.   Pulmonary:      Effort: Pulmonary effort is normal. No respiratory distress.      Breath sounds: Normal breath sounds. No wheezing, rhonchi or rales.   Skin:     General: Skin is warm and dry.      Capillary Refill: Capillary refill takes less than 2 seconds.   Neurological:      General: No focal deficit present.      Mental Status: She is alert and oriented to person, place, and time.   Psychiatric:         Mood and Affect: Mood normal.         Behavior: Behavior normal.       Vitals:    01/24/25 1316 01/24/25 1337   BP: 122/80 (!) 148/86   BP Location: Right arm Right arm   Patient Position: Sitting Sitting   Pulse: 94    Resp: 16    Temp: 98.3 °F (36.8 °C)    TempSrc: Oral    SpO2: 98%    Weight: 77.5 kg (170 lb 13.7 oz)    Height: 4' 11" (1.499 m)       Body mass index is 34.51 kg/m².        History     Past Medical History:   Diagnosis Date    Allergic rhinitis     Allergy     Asthma        Family History   Problem Relation Name Age of Onset    Breast cancer Mother      Cancer Father          throat    Diabetes Maternal Grandmother      Ovarian cancer Neg Hx      Stroke Neg Hx      Hypertension Neg Hx      Melanoma Neg Hx         Allergies and Medications: (updated and reviewed)  Review of patient's allergies indicates:   Allergen Reactions    Hydrocodone-acetaminophen Nausea Only and Nausea And Vomiting     Other reaction(s): Nausea     Current Outpatient " Medications   Medication Sig Dispense Refill    albuterol (PROVENTIL) 2.5 mg /3 mL (0.083 %) nebulizer solution Take 3 mLs (2.5 mg total) by nebulization every 6 (six) hours as needed for Wheezing. 1 each 0    fluticasone propionate (FLONASE) 50 mcg/actuation nasal spray 2 sprays (100 mcg total) by Each Nostril route once daily. 16 g 5    ibuprofen (ADVIL,MOTRIN) 800 MG tablet Take 1 tablet (800 mg total) by mouth every 6 (six) hours as needed for Pain. 30 tablet 1    levocetirizine (XYZAL) 5 MG tablet Take 1 tablet (5 mg total) by mouth every evening. 30 tablet 11    methocarbamoL (ROBAXIN) 500 MG Tab Take 1 tablet (500 mg total) by mouth 3 (three) times daily as needed (pain). 21 tablet 0    triamcinolone acetonide 0.1% (KENALOG) 0.1 % ointment Apply topically 2 (two) times daily. 30 g 0    amLODIPine (NORVASC) 10 MG tablet Take 1 tablet (10 mg total) by mouth once daily. 90 tablet 1    amoxicillin-clavulanate 875-125mg (AUGMENTIN) 875-125 mg per tablet Take 1 tablet by mouth every 12 (twelve) hours. for 5 days 10 tablet 0    cholecalciferol, vitamin D3, 1,250 mcg (50,000 unit) capsule Take 1 capsule (50,000 Units total) by mouth every 7 days. 12 capsule 2     Current Facility-Administered Medications   Medication Dose Route Frequency Provider Last Rate Last Admin    varicella zoster (Shingrix) IM vaccine (>/= 51 yo)  0.5 mL Intramuscular Once            Patient Care Team:  Elizabeth Santo MD as PCP - General (Internal Medicine)         - The patient is given an After Visit Summary that lists all medications with directions, allergies, education, orders placed during this encounter and follow-up instructions.      - I have reviewed the patient's medical information including past medical and family history sections including the medications and allergies.      - We discussed the patient's current medications.   This note was generated with the assistance of ambient listening technology. Verbal consent was obtained  by the patient and accompanying visitor(s) for the recording of patient appointment to facilitate this note. I attest to having reviewed and edited the generated note for accuracy, though some syntax or spelling errors may persist. Please contact the author of this note for any clarification.          Shaun Moreno NP

## 2025-01-24 NOTE — PATIENT INSTRUCTIONS
- Please get over the counter Vitamin D3 800-1,000 IU (20-25 mcg) daily   - Get some compression stockings   - You can call [phone] to get scheduled with the OB/GYN office.

## 2025-02-10 ENCOUNTER — OFFICE VISIT (OUTPATIENT)
Dept: FAMILY MEDICINE | Facility: CLINIC | Age: 60
End: 2025-02-10
Payer: COMMERCIAL

## 2025-02-10 VITALS
OXYGEN SATURATION: 98 % | DIASTOLIC BLOOD PRESSURE: 72 MMHG | BODY MASS INDEX: 33.91 KG/M2 | HEIGHT: 59 IN | WEIGHT: 168.19 LBS | HEART RATE: 93 BPM | SYSTOLIC BLOOD PRESSURE: 138 MMHG | RESPIRATION RATE: 17 BRPM

## 2025-02-10 DIAGNOSIS — I10 ESSENTIAL HYPERTENSION: Primary | ICD-10-CM

## 2025-02-10 DIAGNOSIS — J30.9 ALLERGIC RHINITIS, UNSPECIFIED SEASONALITY, UNSPECIFIED TRIGGER: ICD-10-CM

## 2025-02-10 DIAGNOSIS — T36.95XA ANTIBIOTIC-INDUCED YEAST INFECTION: ICD-10-CM

## 2025-02-10 DIAGNOSIS — B37.9 ANTIBIOTIC-INDUCED YEAST INFECTION: ICD-10-CM

## 2025-02-10 DIAGNOSIS — H93.19 TINNITUS, UNSPECIFIED LATERALITY: ICD-10-CM

## 2025-02-10 PROCEDURE — 3075F SYST BP GE 130 - 139MM HG: CPT | Mod: CPTII,S$GLB,,

## 2025-02-10 PROCEDURE — 3008F BODY MASS INDEX DOCD: CPT | Mod: CPTII,S$GLB,,

## 2025-02-10 PROCEDURE — 1160F RVW MEDS BY RX/DR IN RCRD: CPT | Mod: CPTII,S$GLB,,

## 2025-02-10 PROCEDURE — 99214 OFFICE O/P EST MOD 30 MIN: CPT | Mod: S$GLB,,,

## 2025-02-10 PROCEDURE — 99999 PR PBB SHADOW E&M-EST. PATIENT-LVL IV: CPT | Mod: PBBFAC,,,

## 2025-02-10 PROCEDURE — 1159F MED LIST DOCD IN RCRD: CPT | Mod: CPTII,S$GLB,,

## 2025-02-10 PROCEDURE — 3078F DIAST BP <80 MM HG: CPT | Mod: CPTII,S$GLB,,

## 2025-02-10 RX ORDER — FLUCONAZOLE 150 MG/1
TABLET ORAL
Qty: 2 TABLET | Refills: 0 | Status: SHIPPED | OUTPATIENT
Start: 2025-02-10

## 2025-02-10 RX ORDER — DESLORATADINE 5 MG/1
5 TABLET ORAL DAILY
Qty: 30 TABLET | Refills: 5 | Status: SHIPPED | OUTPATIENT
Start: 2025-02-10

## 2025-02-10 RX ORDER — AZELASTINE 1 MG/ML
1 SPRAY, METERED NASAL 2 TIMES DAILY
Qty: 30 ML | Refills: 1 | Status: SHIPPED | OUTPATIENT
Start: 2025-02-10

## 2025-02-10 NOTE — PROGRESS NOTES
HPI     Rika June is a 59 y.o. female with multiple medical diagnoses as listed in the medical history and problem list that presents for   Chief Complaint   Patient presents with    Follow-up       HPI  Tom last saw me on 1/24/2025 for elevated BP, Amlodipine was increased to 10 mg. She's been checking her blood pressure every day, which she states has been running around <140s systolic. She's not been noticing any leg/ankle swelling.  She does notice some ear ringing still. The pain has subsided after the Augmentin. She doesn't experience any ear fullness. No cough/congestion/fevers.  She's also been having vaginal itching with mild labial/redness for 3 days now. No discharge, no odor, no blood,    Assessment & Plan     Problem List Items Addressed This Visit          Cardiac/Vascular    Essential hypertension - Primary    - BP today initially elevated but decreased on repeat. Continue with Amlodipine 10 mg  - Low sodium diet, regular aerobic exercises 150 min/week, and keep BP goal <140/90.  - Reassess with home logs as needed.       Other Visit Diagnoses       Antibiotic-induced yeast infection        Relevant Medications    fluconazole (DIFLUCAN) 150 MG Tab    - Provided Fluconazole script and instructed to take 1 pill today and another if still having sxs 3 days after.  - no UTI sxs to suggest urine culture/UA at this time.      Tinnitus, unspecified laterality        Relevant Orders    Ambulatory referral/consult to ENT    - PE revealing middle ear effusions, but resolving AOM. Tinnitus likely due to fluid behind her ears and congestion. Advised on daily flonase nasal spray, changing antihistamine to Clarinex, and adding Astelin nasal spray to her regimen. Will also refer out to ENT due to length of her sxs with tinnitus.  - Removed impacted cerumen to bilateral ears. Advised on Debrox OTC.      Allergic rhinitis, unspecified seasonality, unspecified trigger        Relevant Medications    azelastine  (ASTELIN) 137 mcg (0.1 %) nasal spray    desloratadine (CLARINEX) 5 mg tablet    - PE revealing middle ear effusions, but resolving AOM. Tinnitus likely due to fluid behind her ears and congestion. Advised on daily flonase nasal spray, changing antihistamine to Clarinex, and adding Astelin nasal spray to her regimen. Will also refer out to ENT due to length of her sxs with tinnitus.  - Removed impacted cerumen to bilateral ears. Advised on Debrox OTC.     --------------------------------------------    Health Maintenance         Date Due Completion Date    HIV Screening Never done ---    TETANUS VACCINE Never done ---    Mammogram Never done ---    Colorectal Cancer Screening Never done ---    Shingles Vaccine (1 of 2) Never done ---    Pneumococcal Vaccines (Age 50+) (1 of 1 - PCV) Never done ---    Cervical Cancer Screening 09/16/2022 9/16/2019    RSV Vaccine (Age 60+ and Pregnant patients) (1 - Risk 60-74 years 1-dose series) Never done ---    Hemoglobin A1c (Diabetic Prevention Screening) 11/26/2027 11/26/2024    Lipid Panel 11/26/2029 11/26/2024            Health maintenance reviewed    Follow Up:  Follow up if symptoms worsen or fail to improve, for HTN F/U.    Exam     Review of Systems:  (as noted above)  Review of Systems   Constitutional:  Negative for chills, fatigue and fever.   HENT:  Negative for congestion, ear discharge, ear pain, rhinorrhea, sore throat and trouble swallowing.    Eyes:  Negative for visual disturbance.   Respiratory:  Negative for cough, choking, shortness of breath and wheezing.    Cardiovascular:  Negative for chest pain, palpitations and leg swelling.   Gastrointestinal:  Negative for diarrhea, nausea and vomiting.   Genitourinary:  Negative for dysuria, hematuria, pelvic pain, urgency, vaginal bleeding, vaginal discharge and vaginal pain.   Musculoskeletal:  Negative for arthralgias, gait problem and myalgias.   Skin:  Negative for rash.   Neurological:  Negative for dizziness,  "weakness, light-headedness and headaches.   Psychiatric/Behavioral:  The patient is not nervous/anxious.        Physical Exam  Constitutional:       General: She is not in acute distress.     Appearance: Normal appearance. She is not ill-appearing.   HENT:      Head: Normocephalic.      Right Ear: A middle ear effusion is present. Tympanic membrane is not erythematous or bulging.      Left Ear:  No middle ear effusion.      Nose: Congestion and rhinorrhea present.      Right Turbinates: Swollen.      Left Turbinates: Swollen.      Mouth/Throat:      Pharynx: No oropharyngeal exudate or posterior oropharyngeal erythema.   Eyes:      Extraocular Movements: Extraocular movements intact.      Conjunctiva/sclera: Conjunctivae normal.   Cardiovascular:      Rate and Rhythm: Normal rate and regular rhythm.      Pulses: Normal pulses.      Heart sounds: Normal heart sounds. No murmur heard.     No friction rub. No gallop.   Pulmonary:      Effort: Pulmonary effort is normal.      Breath sounds: Normal breath sounds.   Musculoskeletal:      Cervical back: Neck supple.   Lymphadenopathy:      Cervical: No cervical adenopathy.   Skin:     General: Skin is warm and dry.      Capillary Refill: Capillary refill takes less than 2 seconds.   Neurological:      Mental Status: She is alert and oriented to person, place, and time.      Motor: No weakness.   Psychiatric:         Mood and Affect: Mood normal.         Behavior: Behavior normal.       Vitals:    02/10/25 1327 02/10/25 1349   BP: (!) 144/86 138/72   BP Location: Right arm Left arm   Patient Position: Sitting Sitting   Pulse: 93    Resp: 17    SpO2: 98%    Weight: 76.3 kg (168 lb 3.4 oz)    Height: 4' 11" (1.499 m)       Body mass index is 33.97 kg/m².        History     Past Medical History:   Diagnosis Date    Allergic rhinitis     Allergy     Asthma        Family History   Problem Relation Name Age of Onset    Breast cancer Mother      Cancer Father          throat    " Diabetes Maternal Grandmother      Ovarian cancer Neg Hx      Stroke Neg Hx      Hypertension Neg Hx      Melanoma Neg Hx         Allergies and Medications: (updated and reviewed)  Review of patient's allergies indicates:   Allergen Reactions    Hydrocodone-acetaminophen Nausea Only and Nausea And Vomiting     Other reaction(s): Nausea     Current Outpatient Medications   Medication Sig Dispense Refill    albuterol (PROVENTIL) 2.5 mg /3 mL (0.083 %) nebulizer solution Take 3 mLs (2.5 mg total) by nebulization every 6 (six) hours as needed for Wheezing. 1 each 0    amLODIPine (NORVASC) 10 MG tablet Take 1 tablet (10 mg total) by mouth once daily. 90 tablet 1    cholecalciferol, vitamin D3, 1,250 mcg (50,000 unit) capsule Take 1 capsule (50,000 Units total) by mouth every 7 days. 12 capsule 2    fluticasone propionate (FLONASE) 50 mcg/actuation nasal spray 2 sprays (100 mcg total) by Each Nostril route once daily. 16 g 5    ibuprofen (ADVIL,MOTRIN) 800 MG tablet Take 1 tablet (800 mg total) by mouth every 6 (six) hours as needed for Pain. 30 tablet 1    methocarbamoL (ROBAXIN) 500 MG Tab Take 1 tablet (500 mg total) by mouth 3 (three) times daily as needed (pain). 21 tablet 0    triamcinolone acetonide 0.1% (KENALOG) 0.1 % ointment Apply topically 2 (two) times daily. 30 g 0    azelastine (ASTELIN) 137 mcg (0.1 %) nasal spray 1 spray (137 mcg total) by Nasal route 2 (two) times daily. 30 mL 1    desloratadine (CLARINEX) 5 mg tablet Take 1 tablet (5 mg total) by mouth once daily. 30 tablet 5    fluconazole (DIFLUCAN) 150 MG Tab Take 1 tablet (150 mg total) by mouth for 1 day. Take 1 tablet (150 mg total) by mouth if still having symptoms 72 hours after the first dose. 2 tablet 0     Current Facility-Administered Medications   Medication Dose Route Frequency Provider Last Rate Last Admin    varicella zoster (Shingrix) IM vaccine (>/= 51 yo)  0.5 mL Intramuscular Once            Patient Care Team:  Elizabeth Santo MD as  PCP - General (Internal Medicine)         - The patient is given an After Visit Summary that lists all medications with directions, allergies, education, orders placed during this encounter and follow-up instructions.      - I have reviewed the patient's medical information including past medical and family history sections including the medications and allergies.      - We discussed the patient's current medications.   This note was generated with the assistance of ambient listening technology. Verbal consent was obtained by the patient and accompanying visitor(s) for the recording of patient appointment to facilitate this note. I attest to having reviewed and edited the generated note for accuracy, though some syntax or spelling errors may persist. Please contact the author of this note for any clarification.          Shaun Moreno NP

## 2025-02-25 ENCOUNTER — TELEPHONE (OUTPATIENT)
Dept: FAMILY MEDICINE | Facility: CLINIC | Age: 60
End: 2025-02-25
Payer: COMMERCIAL

## 2025-02-25 NOTE — TELEPHONE ENCOUNTER
----- Message from Lizette sent at 2/25/2025  8:40 AM CST -----  .Type: Patient Call BackWho called:SelfWhat is the request in detail:requesting a prescription for some antibiotics for ear. Ask that the nurse give her a call Can the clinic reply by [facility]? No Would the patient rather a call back or a response via [facility]? Call [facility] call back number: .[phone] (home) [phone] (work)Additional Information:

## 2025-05-07 ENCOUNTER — TELEPHONE (OUTPATIENT)
Dept: PODIATRY | Facility: CLINIC | Age: 60
End: 2025-05-07
Payer: COMMERCIAL

## 2025-05-07 NOTE — TELEPHONE ENCOUNTER
Spoke with Ms June to assist her with making an appt with Dr Macario. Accepted appt date and time of 5/29/25 at 3:15 pm due to her being off of work that week. Let her know that if I can offer her an earlier appt time on 5/29/25 I will let her know. No other needs voiced at this time. Call back encouraged if needed.

## 2025-05-27 ENCOUNTER — HOSPITAL ENCOUNTER (OUTPATIENT)
Dept: RADIOLOGY | Facility: HOSPITAL | Age: 60
Discharge: HOME | End: 2025-05-27
Attending: PODIATRIST
Payer: COMMERCIAL

## 2025-05-27 ENCOUNTER — OFFICE VISIT (OUTPATIENT)
Dept: PODIATRY | Facility: CLINIC | Age: 60
End: 2025-05-27
Payer: COMMERCIAL

## 2025-05-27 VITALS
WEIGHT: 168.19 LBS | BODY MASS INDEX: 33.91 KG/M2 | HEART RATE: 83 BPM | DIASTOLIC BLOOD PRESSURE: 91 MMHG | HEIGHT: 59 IN | SYSTOLIC BLOOD PRESSURE: 145 MMHG

## 2025-05-27 DIAGNOSIS — M25.571 ACUTE RIGHT ANKLE PAIN: ICD-10-CM

## 2025-05-27 DIAGNOSIS — S93.401A MODERATE RIGHT ANKLE SPRAIN, INITIAL ENCOUNTER: ICD-10-CM

## 2025-05-27 DIAGNOSIS — M25.571 ACUTE RIGHT ANKLE PAIN: Primary | ICD-10-CM

## 2025-05-27 PROCEDURE — 3080F DIAST BP >= 90 MM HG: CPT | Mod: CPTII,S$GLB,, | Performed by: PODIATRIST

## 2025-05-27 PROCEDURE — 3008F BODY MASS INDEX DOCD: CPT | Mod: CPTII,S$GLB,, | Performed by: PODIATRIST

## 2025-05-27 PROCEDURE — 3077F SYST BP >= 140 MM HG: CPT | Mod: CPTII,S$GLB,, | Performed by: PODIATRIST

## 2025-05-27 PROCEDURE — 1160F RVW MEDS BY RX/DR IN RCRD: CPT | Mod: CPTII,S$GLB,, | Performed by: PODIATRIST

## 2025-05-27 PROCEDURE — 1159F MED LIST DOCD IN RCRD: CPT | Mod: CPTII,S$GLB,, | Performed by: PODIATRIST

## 2025-05-27 PROCEDURE — 99999 PR PBB SHADOW E&M-EST. PATIENT-LVL IV: CPT | Mod: PBBFAC,,, | Performed by: PODIATRIST

## 2025-05-27 PROCEDURE — 99214 OFFICE O/P EST MOD 30 MIN: CPT | Mod: S$GLB,,, | Performed by: PODIATRIST

## 2025-05-27 NOTE — PROGRESS NOTES
Subjective:      Patient ID: Rika June is a 59 y.o. female.    Chief Complaint: Ankle Pain (right)      Patient is a worker's compensation referral for chronic right ankle sprain which was initially treated at urgent care on 10/29/2020.  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  States that she has moderate amount of stiffness and swelling to the right ankle and the pain is aggravated by standing or walking.  She has not worn orthopedic boot or tendon physical therapy.  Recent MRI and CT confirmed nondisplaced posterior malleolus fracture of the right ankle.    04/22/2021:  Follow-up for nondisplaced posterior malleolus fracture of the right ankle x8 weeks.  She received a bone stimulator approximately a few days after her last visit has been using it daily as recommended.  She has been ambulating with the orthopedic boot and states that she has no pain while she is in a boot however she does have pain she takes the boot off and attempts to walk or she applies pressure to different points around her ankle.  She states that she still has stiffness and pain localized to the area.  Patient also states that she fell approximately 3 weeks ago while wearing the boot.  States she was descending down the stairs and landed towards the right knee.  She expressed that she did not have any pain during the fall or afterwards.    05/27/2021:  Follow-up for chronic right ankle pain with a history of delayed union of the posterior malleolar nondisplaced fracture.  She also has an osteochondral defect to the medial talar dome for which she is receiving offloading with an orthopedic boot.  She relates that she does not have much pain when she is at rest or walking with the orthopedic boot.  She does describe aching sensation when she tries to walk around her home without the boot on.  She also describes intermittent burning pain as increasing pointing to the medial  hindfoot/ankle area traveling along the bottom of the foot.  States that her pain is manageable unless she touches an area around the ankle.    07/29/2021:  Presents ambulating with her orthopedic boot to the right lower extremity.  She has no pain with ambulation or activity while using the boot.  She previously completed her nerve conduction study which was normal however cannot tolerate the EMG.  Relates that she does not want to miss any time from work because she fears that her employment is in jeopardy.  She is also requesting to have some the restrictions lifted so that she can perform her duties while at work.  She works as a supervisor in a corrections facility.      08/25/2021:  Presents today for reassessment of the right ankle.  States that she has been ambulating with the ankle brace and a tennis shoe and experiences no significant pain.  She is able to work and perform her regular duties at work with the ankle brace and is requesting removal of her previous restrictions.  States that she will experience stiffness with the 1st few steps in the morning stating pain is rated as 4/10 that will improve significantly with moving around.      08/29/2022:  Follow up for chronic right ankle pain.  It has been over a year since her last examination.  She relates that she has a feeling of instability to the right ankle like her ankle is going to give out when she walks.  She is afraid to place too much weight on her foot.  She wears her brace sometimes however with the current shoe gear that she wears at work her brace is not fit in it.  She works at a [email or web address].  She also complains of weakness to the right lower extremity.  Pain aggravated by increased periods of standing and/or walking to the right ankle.    09/26/2022:  Follow-up for right ankle pain following MRI.  Patient relates that she is unable to wear her ankle brace for tennis shoes for long periods of time at work.  States that the  pain will increase with increased duration standing or walking.    11/28/2022:  Follow-up for chronic right ankle pain.  Patient is not wearing her ankle brace or compression stocking AMA.  She reports pain will wax and wane to the right ankle depending on her activity.  Notes that the pain and swelling to the right ankle resolve with rest.  She continues to work.  Wearing flip-flops today.  Relates that she worked last night.  She has not gone for fitting of right AFO as prescribed.    02/28/2023:  Follow-up for chronic right ankle pain.  She receives the [place] brace and has been wearing it at work.  Reports improvement in subjective pain while working which is when she has a significant flare-up in pain.  She is able to perform her duties and has no reduced ability to perform her activities of daily living.  She is ambulating with flip-flops today.    10/31/2023:  Follow-up for right foot injury which was initially treated at [facility] yesterday with left foot x-ray completed and read per radiology.  Imaging not directly available to us today.  Patient relates pain and swelling pointing to the lateral left midfoot region.  She was not dispensed any DME.  She is ambulating with Crocs.  Patient was also due for follow-up for chronic right ankle pain secondary to posttraumatic arthritis.  She wears an AFO while working which helps reduce her pain and reduce the incidence of flare-ups.    11/13/2023:  Follow-up for 5th metatarsal base fracture left foot x2 weeks.  Notes that she gets aching pain throughout this area at rest.  She is been performing minimal activity with the Cam boot and resting as advised.  She is no longer on vitamin-D when questioned.  She does have a history of vitamin-D deficiency.    12/19/2023:  Follow-up for 5th metatarsal base fracture x6 weeks.  Offloaded with Cam boot.  She has stopped working and is home with restrictions.    01/29/2024:  Follow-up for 5th metatarsal base  "avulsion fracture.  Relates that she has residual intermittent pain to this area but it is improved overall.  She has been using the cam boot.  Has not returned to work.  Also inquiring about treatment for discolored and thickened toenails to both feet.  States that she stopped receiving pedicures.    2024: Follow-up for delayed union/nonunion 5th metatarsal June fracture/avulsion fracture.  Relates no significant pain but she does note when she walks without the boot 1st thing in the morning she has some stiffness.  She has been using the bone stimulator for 3 hours a day as prescribed.  She recalls hitting her foot a few days ago which caused him discomfort but that has improved.    2024:  Follow-up for 5th metatarsal fracture.  She returned to work on 2024.  She describes numbness to the left 5th toe and in intermittent sharp pain.  Takes ibuprofen as needed for pain.  She is wearing slip-on athletic style shoes today.  Completed a shift last night of 12 hours.  Left foot x-ray completed today.    2025:  Presents complaining of acute onset pain that began about a week ago when it may jumped and latched onto her right ankle.  She is trying to get away but could not.  The in May was preventing themselves from being dragged away.  She reports that the pain improves with rest but she does have pain that worsens with standing and walking.  She has been resting, icing and taking Tylenol which helped.    Vitals:    25 1305   BP: (!) 145/91   Pulse: 83   Weight: 76.3 kg (168 lb 3.4 oz)   Height: 4' 11" (1.499 m)   PainSc:   5      Past Medical History:   Diagnosis Date    Allergic rhinitis     Allergy     Asthma        Past Surgical History:   Procedure Laterality Date     SECTION      TUBAL LIGATION         Family History   Problem Relation Name Age of Onset    Breast cancer Mother      Cancer Father          throat    Diabetes Maternal Grandmother      Ovarian cancer Neg Hx      " Stroke Neg Hx      Hypertension Neg Hx      Melanoma Neg Hx         Social History     Socioeconomic History    Marital status: Legally    Tobacco Use    Smoking status: Never    Smokeless tobacco: Never   Substance and Sexual Activity    Alcohol use: No    Drug use: No    Sexual activity: Yes     Partners: Male   Other Topics Concern    Are you pregnant or think you may be? No    Breast-feeding No       Current Outpatient Medications   Medication Sig Dispense Refill    albuterol (PROVENTIL) 2.5 mg /3 mL (0.083 %) nebulizer solution Take 3 mLs (2.5 mg total) by nebulization every 6 (six) hours as needed for Wheezing. 1 each 0    amLODIPine (NORVASC) 10 MG tablet Take 1 tablet (10 mg total) by mouth once daily. 90 tablet 1    azelastine (ASTELIN) 137 mcg (0.1 %) nasal spray 1 spray (137 mcg total) by Nasal route 2 (two) times daily. 30 mL 1    cholecalciferol, vitamin D3, 1,250 mcg (50,000 unit) capsule Take 1 capsule (50,000 Units total) by mouth every 7 days. 12 capsule 2    desloratadine (CLARINEX) 5 mg tablet Take 1 tablet (5 mg total) by mouth once daily. 30 tablet 5    fluconazole (DIFLUCAN) 150 MG Tab Take 1 tablet (150 mg total) by mouth for 1 day. Take 1 tablet (150 mg total) by mouth if still having symptoms 72 hours after the first dose. 2 tablet 0    fluticasone propionate (FLONASE) 50 mcg/actuation nasal spray 2 sprays (100 mcg total) by Each Nostril route once daily. 16 g 5    ibuprofen (ADVIL,MOTRIN) 800 MG tablet Take 1 tablet (800 mg total) by mouth every 6 (six) hours as needed for Pain. 30 tablet 1    methocarbamoL (ROBAXIN) 500 MG Tab Take 1 tablet (500 mg total) by mouth 3 (three) times daily as needed (pain). 21 tablet 0    triamcinolone acetonide 0.1% (KENALOG) 0.1 % ointment Apply topically 2 (two) times daily. 30 g 0     Current Facility-Administered Medications   Medication Dose Route Frequency Provider Last Rate Last Admin    varicella zoster (Shingrix) IM vaccine (>/= 49 yo)  0.5  mL Intramuscular Once            Review of patient's allergies indicates:   Allergen Reactions    Hydrocodone-acetaminophen Nausea Only and Nausea And Vomiting     Other reaction(s): Nausea         Review of Systems   Constitutional: Negative for chills, fever and malaise/fatigue.   HENT:  Negative for congestion and hearing loss.    Cardiovascular:  Negative for chest pain, claudication and leg swelling.   Respiratory:  Negative for cough and shortness of breath.    Skin:  Negative for color change and poor wound healing.   Musculoskeletal:  Positive for joint pain, joint swelling and stiffness. Negative for back pain, muscle cramps and muscle weakness.   Gastrointestinal:  Negative for nausea and vomiting.   Neurological:  Negative for numbness, paresthesias and weakness.   Psychiatric/Behavioral:  Negative for altered mental status.            Objective:      Physical Exam  Constitutional:       General: She is not in acute distress.  Cardiovascular:      Pulses:           Dorsalis pedis pulses are 2+ on the left side.        Posterior tibial pulses are 2+ on the left side.   Musculoskeletal:      Comments: There is a localized pain on palpation directly overlying the lateral malleolus however the worse pain is inferior to the tip of the lateral malleolus directly in the area of the CF ligament and underlying peroneal tendons with moderate pain during talar tilt test which seems to be increased compared to left side.  Negative anterior drawer sign right ankle.  Negative stress eversion of the right ankle and no pain stress external rotation.  There is some mild localized edema also to the lateral right ankle.  No palpable fluctuance or crepitance.  Minimal discomfort with active resisted eversion of the neutral position of the right foot.    Pes planus foot structure bilateral ankle.    Left ankle with a positive anterior drawer sign.   Skin:     General: Skin is warm.      Capillary Refill: Capillary refill  takes less than 2 seconds.      Findings: No ecchymosis or erythema.      Nails: There is no clubbing.   Neurological:      Mental Status: She is alert and oriented to person, place, and time.      Sensory: Sensation is intact.      Motor: Weakness present.             Assessment:       Encounter Diagnoses   Name Primary?    Acute right ankle pain Yes    Moderate right ankle sprain, initial encounter          Plan:       Rika was seen today for ankle pain.    Diagnoses and all orders for this visit:    Acute right ankle pain  -     X-Ray Ankle Complete Right; Future  -     ORTHOPEDIC BRACING FOR HOME USE - LOWER EXTREMITY  -     Ambulatory Referral/Consult to Physical Therapy    Moderate right ankle sprain, initial encounter  -     ORTHOPEDIC BRACING FOR HOME USE - LOWER EXTREMITY  -     Ambulatory Referral/Consult to Physical Therapy      I counseled the patient on her conditions, their implications and medical management.    Clinically patient appears to have an acute right ankle injury with suspected injury to the calcaneofibular ligament.  Dispensed confident applied a right ankle brace.  She may participate in activity as tolerated for now however modified as much as possible.  Rest, ice and elevation p.r.n..  Patient will also need referral to physical therapy for modalities help reduce her pain inflammation and gradually strengthen the right ankle.    Weight-bearing right ankle x-ray to be completed today.    RTC within 4-6 weeks to re-evaluate or p.r.n. as discussed.    Assisted per Earl Reed DPM PGY 2    A portion of this note was generated by voice recognition software and may contain spelling and grammar errors.

## 2025-05-28 ENCOUNTER — PATIENT MESSAGE (OUTPATIENT)
Dept: PODIATRY | Facility: CLINIC | Age: 60
End: 2025-05-28
Payer: COMMERCIAL

## 2025-05-28 ENCOUNTER — HOSPITAL ENCOUNTER (OUTPATIENT)
Dept: RADIOLOGY | Facility: HOSPITAL | Age: 60
Discharge: HOME | End: 2025-05-28
Attending: PODIATRIST
Payer: COMMERCIAL

## 2025-05-28 PROCEDURE — 73610 X-RAY EXAM OF ANKLE: CPT | Mod: TC,FY,RT

## 2025-05-28 PROCEDURE — 73610 X-RAY EXAM OF ANKLE: CPT | Mod: 26,RT,, | Performed by: RADIOLOGY

## 2025-06-23 RX ORDER — IBUPROFEN 800 MG/1
800 TABLET, FILM COATED ORAL EVERY 6 HOURS PRN
Qty: 30 TABLET | Refills: 1 | Status: SHIPPED | OUTPATIENT
Start: 2025-06-23

## 2025-06-23 RX ORDER — ASPIRIN 325 MG
50000 TABLET, DELAYED RELEASE (ENTERIC COATED) ORAL
Qty: 12 CAPSULE | Refills: 2 | OUTPATIENT
Start: 2025-06-23

## 2025-06-25 ENCOUNTER — DOCUMENTATION ONLY (OUTPATIENT)
Dept: REHABILITATION | Facility: HOSPITAL | Age: 60
End: 2025-06-25
Payer: COMMERCIAL

## 2025-06-25 NOTE — PROGRESS NOTES
Physical Therapy: No show/Cancellation of Visit  Date: 06/25/2025    Patient was a no show to today's PT evaluation.     Therapist: Claudia Brown PT

## 2025-08-04 ENCOUNTER — PATIENT MESSAGE (OUTPATIENT)
Dept: ADMINISTRATIVE | Facility: HOSPITAL | Age: 60
End: 2025-08-04
Payer: COMMERCIAL